# Patient Record
Sex: FEMALE | Race: WHITE | NOT HISPANIC OR LATINO | Employment: OTHER | URBAN - METROPOLITAN AREA
[De-identification: names, ages, dates, MRNs, and addresses within clinical notes are randomized per-mention and may not be internally consistent; named-entity substitution may affect disease eponyms.]

---

## 2019-03-07 ENCOUNTER — HOSPITAL ENCOUNTER (OUTPATIENT)
Dept: GENERAL RADIOLOGY | Facility: HOSPITAL | Age: 64
Discharge: HOME | End: 2019-03-07
Admitting: ORTHOPAEDIC SURGERY

## 2019-03-07 ENCOUNTER — APPOINTMENT (OUTPATIENT)
Dept: PREADMISSION TESTING | Facility: HOSPITAL | Age: 64
End: 2019-03-07

## 2019-03-07 ENCOUNTER — HOSPITAL ENCOUNTER (OUTPATIENT)
Dept: GENERAL RADIOLOGY | Facility: HOSPITAL | Age: 64
Discharge: HOME | End: 2019-03-07

## 2019-03-07 VITALS
TEMPERATURE: 98.5 F | RESPIRATION RATE: 16 BRPM | SYSTOLIC BLOOD PRESSURE: 124 MMHG | BODY MASS INDEX: 42.29 KG/M2 | OXYGEN SATURATION: 97 % | WEIGHT: 224 LBS | DIASTOLIC BLOOD PRESSURE: 77 MMHG | HEART RATE: 68 BPM | HEIGHT: 61 IN

## 2019-03-07 DIAGNOSIS — M17.9 OSTEOARTHRITIS OF KNEE, UNSPECIFIED LATERALITY, UNSPECIFIED OSTEOARTHRITIS TYPE: ICD-10-CM

## 2019-03-07 LAB
ALBUMIN SERPL-MCNC: 4.8 G/DL (ref 3.5–5.2)
ALBUMIN/GLOB SERPL: 1.5 G/DL
ALP SERPL-CCNC: 74 U/L (ref 39–117)
ALT SERPL W P-5'-P-CCNC: 26 U/L (ref 1–33)
ANION GAP SERPL CALCULATED.3IONS-SCNC: 13.8 MMOL/L
AST SERPL-CCNC: 20 U/L (ref 1–32)
BACTERIA UR QL AUTO: NORMAL /HPF
BILIRUB SERPL-MCNC: 0.5 MG/DL (ref 0.1–1.2)
BILIRUB UR QL STRIP: NEGATIVE
BUN BLD-MCNC: 28 MG/DL (ref 8–23)
BUN/CREAT SERPL: 32.9 (ref 7–25)
CALCIUM SPEC-SCNC: 10.4 MG/DL (ref 8.6–10.5)
CHLORIDE SERPL-SCNC: 101 MMOL/L (ref 98–107)
CLARITY UR: CLEAR
CO2 SERPL-SCNC: 26.2 MMOL/L (ref 22–29)
COLOR UR: YELLOW
CREAT BLD-MCNC: 0.85 MG/DL (ref 0.57–1)
DEPRECATED RDW RBC AUTO: 45 FL (ref 37–54)
ERYTHROCYTE [DISTWIDTH] IN BLOOD BY AUTOMATED COUNT: 13.6 % (ref 12.3–15.4)
GFR SERPL CREATININE-BSD FRML MDRD: 68 ML/MIN/1.73
GLOBULIN UR ELPH-MCNC: 3.3 GM/DL
GLUCOSE BLD-MCNC: 99 MG/DL (ref 65–99)
GLUCOSE UR STRIP-MCNC: NEGATIVE MG/DL
HCT VFR BLD AUTO: 42.8 % (ref 34–46.6)
HGB BLD-MCNC: 13.4 G/DL (ref 12–15.9)
HGB UR QL STRIP.AUTO: NEGATIVE
HYALINE CASTS UR QL AUTO: NORMAL /LPF
INR PPP: 1.09 (ref 0.9–1.1)
KETONES UR QL STRIP: NEGATIVE
LEUKOCYTE ESTERASE UR QL STRIP.AUTO: ABNORMAL
MCH RBC QN AUTO: 28 PG (ref 26.6–33)
MCHC RBC AUTO-ENTMCNC: 31.3 G/DL (ref 31.5–35.7)
MCV RBC AUTO: 89.5 FL (ref 79–97)
NITRITE UR QL STRIP: NEGATIVE
PH UR STRIP.AUTO: <=5 [PH] (ref 5–8)
PLATELET # BLD AUTO: 295 10*3/MM3 (ref 140–450)
PMV BLD AUTO: 10 FL (ref 6–12)
POTASSIUM BLD-SCNC: 3.7 MMOL/L (ref 3.5–5.2)
PROT SERPL-MCNC: 8.1 G/DL (ref 6–8.5)
PROT UR QL STRIP: NEGATIVE
PROTHROMBIN TIME: 13.9 SECONDS (ref 11.7–14.2)
RBC # BLD AUTO: 4.78 10*6/MM3 (ref 3.77–5.28)
RBC # UR: NORMAL /HPF
REF LAB TEST METHOD: NORMAL
SODIUM BLD-SCNC: 141 MMOL/L (ref 136–145)
SP GR UR STRIP: 1.03 (ref 1–1.03)
SQUAMOUS #/AREA URNS HPF: NORMAL /HPF
UROBILINOGEN UR QL STRIP: ABNORMAL
WBC NRBC COR # BLD: 7.29 10*3/MM3 (ref 3.4–10.8)
WBC UR QL AUTO: NORMAL /HPF

## 2019-03-07 PROCEDURE — 36415 COLL VENOUS BLD VENIPUNCTURE: CPT

## 2019-03-07 PROCEDURE — 85610 PROTHROMBIN TIME: CPT | Performed by: ORTHOPAEDIC SURGERY

## 2019-03-07 PROCEDURE — 93005 ELECTROCARDIOGRAM TRACING: CPT

## 2019-03-07 PROCEDURE — 85027 COMPLETE CBC AUTOMATED: CPT | Performed by: ORTHOPAEDIC SURGERY

## 2019-03-07 PROCEDURE — 93010 ELECTROCARDIOGRAM REPORT: CPT | Performed by: INTERNAL MEDICINE

## 2019-03-07 PROCEDURE — 71046 X-RAY EXAM CHEST 2 VIEWS: CPT

## 2019-03-07 PROCEDURE — 81001 URINALYSIS AUTO W/SCOPE: CPT | Performed by: ORTHOPAEDIC SURGERY

## 2019-03-07 PROCEDURE — 73560 X-RAY EXAM OF KNEE 1 OR 2: CPT

## 2019-03-07 PROCEDURE — 80053 COMPREHEN METABOLIC PANEL: CPT | Performed by: ORTHOPAEDIC SURGERY

## 2019-03-07 RX ORDER — HYDROCHLOROTHIAZIDE 25 MG/1
25 TABLET ORAL DAILY
COMMUNITY
End: 2019-10-04 | Stop reason: SDUPTHER

## 2019-03-07 RX ORDER — ATORVASTATIN CALCIUM 40 MG/1
40 TABLET, FILM COATED ORAL DAILY
COMMUNITY
Start: 2019-02-08 | End: 2022-08-05

## 2019-03-07 RX ORDER — ASPIRIN 81 MG/1
81 TABLET ORAL DAILY
COMMUNITY
End: 2019-03-15 | Stop reason: HOSPADM

## 2019-03-07 RX ORDER — OMEPRAZOLE 40 MG/1
40 CAPSULE, DELAYED RELEASE ORAL DAILY
COMMUNITY
End: 2020-03-18 | Stop reason: SDUPTHER

## 2019-03-07 RX ORDER — CHLORAL HYDRATE 500 MG
2 CAPSULE ORAL DAILY
COMMUNITY
End: 2019-09-10

## 2019-03-07 RX ORDER — LOSARTAN POTASSIUM 100 MG/1
100 TABLET ORAL DAILY
COMMUNITY
End: 2019-05-07 | Stop reason: SDUPTHER

## 2019-03-07 RX ORDER — PAROXETINE HYDROCHLORIDE 20 MG/1
20 TABLET, FILM COATED ORAL DAILY
COMMUNITY
End: 2019-05-14 | Stop reason: SDUPTHER

## 2019-03-07 RX ORDER — AMLODIPINE BESYLATE 5 MG/1
5 TABLET ORAL DAILY
COMMUNITY
End: 2019-04-15 | Stop reason: SDUPTHER

## 2019-03-07 ASSESSMENT — KOOS JR
KOOS JR SCORE: 36.931
KOOS JR SCORE: 20

## 2019-03-07 NOTE — DISCHARGE INSTRUCTIONS
Take the following medications the morning of surgery with a small sip of water:  AMLODIPINE,LOSARTAN AND OMEPRAZOLE    General Instructions:  • Do not eat solid food after midnight the night before surgery.  • You may drink clear liquids day of surgery but must stop at least one hour before your hospital arrival time.  • It is beneficial for you to have a clear drink that contains carbohydrates the day of surgery.  We suggest a 12 to 20 ounce bottle of Gatorade or Powerade for non-diabetic patients or a 12 to 20 ounce bottle of G2 or Powerade Zero for diabetic patients. (Pediatric patients, are not advised to drink a 12 to 20 ounce carbohydrate drink)    Clear liquids are liquids you can see through.  Nothing red in color.     Plain water                               Sports drinks  Sodas                                   Gelatin (Jell-O)  Fruit juices without pulp such as white grape juice and apple juice  Popsicles that contain no fruit or yogurt  Tea or coffee (no cream or milk added)  Gatorade / Powerade  G2 / Powerade Zero    • Infants may have breast milk up to four hours before surgery.  • Infants drinking formula may drink formula up to six hours before surgery.   • Patients who avoid smoking, chewing tobacco and alcohol for 4 weeks prior to surgery have a reduced risk of post-operative complications.  Quit smoking as many days before surgery as you can.  • Do not smoke, use chewing tobacco or drink alcohol the day of surgery.   • If applicable bring your C-PAP/ BI-PAP machine.  • Bring any papers given to you in the doctor’s office.  • Wear clean comfortable clothes and socks.  • Do not wear contact lenses or make-up.  Bring a case for your glasses.   • Bring crutches or walker if applicable.  • Remove all piercings.  Leave jewelry and any other valuables at home.  • Hair extensions with metal clips must be removed prior to surgery.  • The Pre-Admission Testing nurse will instruct you to bring medications  if unable to obtain an accurate list in Pre-Admission Testing.        If you were given a blood bank ID arm band remember to bring it with you the day of surgery.    Preventing a Surgical Site Infection:  • For 2 to 3 days before surgery, avoid shaving with a razor because the razor can irritate skin and make it easier to develop an infection.    • Any areas of open skin can increase the risk of a post-operative wound infection by allowing bacteria to enter and travel throughout the body.  Notify your surgeon if you have any skin wounds / rashes even if it is not near the expected surgical site.  The area will need assessed to determine if surgery should be delayed until it is healed.  • The night prior to surgery sleep in a clean bed with clean clothing.  Do not allow pets to sleep with you.  • Shower on the morning of surgery using a fresh bar of anti-bacterial soap (such as Dial) and clean washcloth.  Dry with a clean towel and dress in clean clothing.  • Ask your surgeon if you will be receiving antibiotics prior to surgery.  • Make sure you, your family, and all healthcare providers clean their hands with soap and water or an alcohol based hand  before caring for you or your wound.    Day of surgery: 3/14/2019 ARRIVAL TIME 10:30 AM  Upon arrival, a Pre-op nurse and Anesthesiologist will review your health history, obtain vital signs, and answer questions you may have.  The only belongings needed at this time will be your home medications and if applicable your C-PAP/BI-PAP machine.  If you are staying overnight your family can leave the rest of your belongings in the car and bring them to your room later.  A Pre-op nurse will start an IV and you may receive medication in preparation for surgery, including something to help you relax.  Your family will be able to see you in the Pre-op area.  While you are in surgery your family should notify the waiting room  if they leave the waiting room  area and provide a contact phone number.    Please be aware that surgery does come with discomfort.  We want to make every effort to control your discomfort so please discuss any uncontrolled symptoms with your nurse.   Your doctor will most likely have prescribed pain medications.      If you are going home after surgery you will receive individualized written care instructions before being discharged.  A responsible adult must drive you to and from the hospital on the day of your surgery and stay with you for 24 hours.    If you are staying overnight following surgery, you will be transported to your hospital room following the recovery period.  [facility] has all private rooms.    You have received a list of surgical assistants for your reference.  If you have any questions please call Pre-Admission Testing at [phone].  Deductibles and co-payments are collected on the day of service. Please be prepared to pay the required co-pay, deductible or deposit on the day of service as defined by your plan.    2% CHLORAHEXIDINE GLUCONATE* CLOTH  Preparing or “prepping” skin before surgery can reduce the risk of infection at the surgical site. To make the process easier, [facility] has chosen disposable cloths moistened with a rinse-free, 2% Chlorhexidine Gluconate (CHG) antiseptic solution. The steps below outline the prepping process and should be carefully followed.        Use the prep cloth on the area that is circled in the diagram             Directions Night before Surgery  1) Shower using a fresh bar of anti-bacterial soap (such as Dial) and clean washcloth.  Use a clean towel to completely dry your skin.  2) Do not use any lotions, oils or creams on your skin.  3) Open the package and remove 1 cloth, wipe your skin for 30 seconds in a circular motion.  Allow to dry for 3 minutes.  4) Repeat #3 with second cloth.  5) Do not touch your eyes, ears, or mouth with the prep cloth.  6) Allow  the wet prep solution to air dry.  7) Discard the prep cloth and wash your hands with soap and water.   8) Dress in clean bed clothes and sleep on fresh clean bed sheets.   9) You may experience some temporary itching after the prep.    Directions Day of Surgery  1) Repeat steps 1,2,3,4,5,6,7, and 9.   2) Dress in clean clothes before coming to the hospital.    BACTROBAN NASAL OINTMENT  There are many germs normally in your nose. Bactroban is an ointment that will help reduce these germs. Please follow these instructions for Bactroban use:      ____The day before surgery in the morning  Date________    ____The day before surgery in the evening              Date________    ____The day of surgery in the morning    Date________    **Squirt ½ package of Bactroban Ointment onto a cotton applicator and apply to inside of 1st nostril.  Squirt the remaining Bactroban and apply to the inside of the other nostril

## 2019-03-11 NOTE — PROGRESS NOTES
Pre-Operative Orthopedic Assessment      Patient: Ashley Romero    YOB: 1955      Age/Gender: 63 y.o. female  Medical Record Number: [id number]  Surgical Procedure Planned: RIGHT TOTAL KNEE ARTHROPLASTY     Surgeon: Estevan Esquivel MD    Date of Surgery Planned: 3/14/19    Question Value Score    Patient Score   1. What is your age group? 50-65 years  66-75 years  >75 years =2  =1  =0 2   2. Gender? Male  Female =2  =1 1   3. How far on average can you walk? (a block is 200 meters) Two blocks or more (+\- rest)  1-2 blocks (+\- rest)  Housebound (most of time) =2  =1  =0 1   4. Which gait aid to you use? (more often than not) None  Single-Point Stick  Crutches/Frame =2  =1  =0 2   5. Do you use community  supports? (home help, meals on wheels, district nursing) None or one per week  Two or more per week =1  =0 1   6. Will you live with someone who can care for you after your operation? Yes  No =3  =0 3      Your Score (out of 12)  10     Key Destination at Discharge from acute care predicted by score   Scores < 6  -- extended inpatient rehabilitation   Scores 6-9 -- additional intervention to discharge directly home (Rehabilitation in home)   Scores > 9 -- directly home         Discharge Disposition/Planning:     Patient Response   Discussed the Predicted discharge disposition needed based on RAPT Assessment with the patient.    Y   Patient selected discharge disposition:   HOME   Out Patient Rehabilitation Facility of Choice:    [facility] Preferred:   [facility] [place]   [facility]   [facility]# [phone]     Subacute Inpatient Rehabilitation:  Complete this section only if planning inpatient services at a Subacute Facility     Patient Response   Subacute Facility Preferred (Please list 2 facilities:      Requires pre-certification for inpatient rehabilitation services?         Planned source of transportation to inpatient  rehabilitation facility?       If choosing inpatient services at an Acute or Subacute Facility please list a subsequent back-up plan (in case patient fails to qualify for inpatient rehabilitation). Back-up plans should include caregiver (family member or friend) for first 24-48 post- -operatively.       Home Equipment Patient Response   Does patient have a walker for home use?    N   Does patient have a 3 in 1 commode for home use?    N   Does patient have a shower chair for home use?    N   Does patient have an elevated commode seat for home use? N   Does patient have a cane for home use?    N   Is there any other medical equipment in the home? If so,  List in comment section below N   Pre-Operative Class Attendance Patient Response   Attended or scheduled to attend the pre-operative class within 1 year of total joint replacement? Y   Patient Education  Completed   Expected time of discharge discussed Y   Encouraged to attend Pre-Operative Class    Y   Education re: Predicted Discharge Disposition based on RAPT score    Y   Patient receptive and voiced understanding of information given    Y                                                                                                            Comments:    FACE SHEET CORRECT.     2 STEPS INTO HOUSE W/RAILAND NONE INSIDE.     NO EQUIPMENT.     PLANS TO USE [facility] IN [place] FOR HOME PT AND [facility] IN [facility] FOR OUTPT REHAB .                                       Signature: Ga Best LPN    Date:  3/11/2019

## 2019-03-14 ENCOUNTER — HOSPITAL ENCOUNTER (OUTPATIENT)
Facility: HOSPITAL | Age: 64
Setting detail: OBSERVATION
Discharge: HOME | End: 2019-03-15
Attending: ORTHOPAEDIC SURGERY | Admitting: ORTHOPAEDIC SURGERY

## 2019-03-14 ENCOUNTER — ANESTHESIA EVENT (OUTPATIENT)
Dept: PERIOP | Facility: HOSPITAL | Age: 64
End: 2019-03-14

## 2019-03-14 ENCOUNTER — ANESTHESIA (OUTPATIENT)
Dept: PERIOP | Facility: HOSPITAL | Age: 64
End: 2019-03-14

## 2019-03-14 ENCOUNTER — APPOINTMENT (OUTPATIENT)
Dept: GENERAL RADIOLOGY | Facility: HOSPITAL | Age: 64
End: 2019-03-14

## 2019-03-14 DIAGNOSIS — R26.2 DIFFICULTY WALKING: Primary | ICD-10-CM

## 2019-03-14 PROBLEM — M17.9 OA (OSTEOARTHRITIS) OF KNEE: Status: ACTIVE | Noted: 2019-03-14

## 2019-03-14 LAB
ALT SERPL W P-5'-P-CCNC: 27 U/L (ref 1–33)
HBV SURFACE AB SER RIA-ACNC: NORMAL
HBV SURFACE AG SERPL QL IA: NORMAL
HCV AB SER DONR QL: NORMAL
HIV1+2 AB SER QL: NORMAL

## 2019-03-14 PROCEDURE — 25010000002 ONDANSETRON PER 1 MG: Performed by: NURSE ANESTHETIST, CERTIFIED REGISTERED

## 2019-03-14 PROCEDURE — 25010000002 FENTANYL CITRATE (PF) 100 MCG/2ML SOLUTION: Performed by: ANESTHESIOLOGY

## 2019-03-14 PROCEDURE — G0378 HOSPITAL OBSERVATION PER HR: HCPCS

## 2019-03-14 PROCEDURE — 25010000002 FENTANYL CITRATE (PF) 100 MCG/2ML SOLUTION: Performed by: NURSE ANESTHETIST, CERTIFIED REGISTERED

## 2019-03-14 PROCEDURE — 25010000002 DEXAMETHASONE PER 1 MG: Performed by: NURSE ANESTHETIST, CERTIFIED REGISTERED

## 2019-03-14 PROCEDURE — 86706 HEP B SURFACE ANTIBODY: CPT | Performed by: ORTHOPAEDIC SURGERY

## 2019-03-14 PROCEDURE — 25010000002 HYDROMORPHONE PER 4 MG: Performed by: NURSE ANESTHETIST, CERTIFIED REGISTERED

## 2019-03-14 PROCEDURE — 73560 X-RAY EXAM OF KNEE 1 OR 2: CPT

## 2019-03-14 PROCEDURE — 84460 ALANINE AMINO (ALT) (SGPT): CPT | Performed by: ORTHOPAEDIC SURGERY

## 2019-03-14 PROCEDURE — 25010000002 PROPOFOL 10 MG/ML EMULSION: Performed by: NURSE ANESTHETIST, CERTIFIED REGISTERED

## 2019-03-14 PROCEDURE — 25010000002 ROPIVACAINE PER 1 MG: Performed by: ANESTHESIOLOGY

## 2019-03-14 PROCEDURE — 25010000002 ROPIVACAINE PER 1 MG: Performed by: ORTHOPAEDIC SURGERY

## 2019-03-14 PROCEDURE — 25010000002 MIDAZOLAM PER 1 MG: Performed by: ANESTHESIOLOGY

## 2019-03-14 PROCEDURE — 25010000002 KETOROLAC TROMETHAMINE PER 15 MG: Performed by: ORTHOPAEDIC SURGERY

## 2019-03-14 PROCEDURE — 25010000003 CEFAZOLIN IN DEXTROSE 2-4 GM/100ML-% SOLUTION: Performed by: ORTHOPAEDIC SURGERY

## 2019-03-14 PROCEDURE — C1713 ANCHOR/SCREW BN/BN,TIS/BN: HCPCS | Performed by: ORTHOPAEDIC SURGERY

## 2019-03-14 PROCEDURE — 97110 THERAPEUTIC EXERCISES: CPT

## 2019-03-14 PROCEDURE — G0432 EIA HIV-1/HIV-2 SCREEN: HCPCS | Performed by: ORTHOPAEDIC SURGERY

## 2019-03-14 PROCEDURE — 86803 HEPATITIS C AB TEST: CPT | Performed by: ORTHOPAEDIC SURGERY

## 2019-03-14 PROCEDURE — 97161 PT EVAL LOW COMPLEX 20 MIN: CPT

## 2019-03-14 PROCEDURE — C1776 JOINT DEVICE (IMPLANTABLE): HCPCS | Performed by: ORTHOPAEDIC SURGERY

## 2019-03-14 PROCEDURE — 87340 HEPATITIS B SURFACE AG IA: CPT | Performed by: ORTHOPAEDIC SURGERY

## 2019-03-14 DEVICE — CMT BONE PALACOS R HI/VISC 1X40: Type: IMPLANTABLE DEVICE | Site: KNEE | Status: FUNCTIONAL

## 2019-03-14 DEVICE — CAP TOTL KN CMT PREMIUM: Type: IMPLANTABLE DEVICE | Site: KNEE | Status: FUNCTIONAL

## 2019-03-14 DEVICE — COMP FEM/KN VANGUARD INTLK CR 62.5MM NS RT: Type: IMPLANTABLE DEVICE | Site: KNEE | Status: FUNCTIONAL

## 2019-03-14 DEVICE — TRY TIB INTERLK PRI 67MM: Type: IMPLANTABLE DEVICE | Site: KNEE | Status: FUNCTIONAL

## 2019-03-14 DEVICE — STEM TIB PRI FINN 46X40MM: Type: IMPLANTABLE DEVICE | Site: KNEE | Status: FUNCTIONAL

## 2019-03-14 DEVICE — IMPLANTABLE DEVICE: Type: IMPLANTABLE DEVICE | Site: KNEE | Status: FUNCTIONAL

## 2019-03-14 DEVICE — PAT 3PEG THN 34X7.8 34MM: Type: IMPLANTABLE DEVICE | Site: KNEE | Status: FUNCTIONAL

## 2019-03-14 RX ORDER — PANTOPRAZOLE SODIUM 40 MG/1
40 TABLET, DELAYED RELEASE ORAL EVERY MORNING
Status: DISCONTINUED | OUTPATIENT
Start: 2019-03-14 | End: 2019-03-15 | Stop reason: HOSPADM

## 2019-03-14 RX ORDER — FLUMAZENIL 0.1 MG/ML
0.2 INJECTION INTRAVENOUS AS NEEDED
Status: DISCONTINUED | OUTPATIENT
Start: 2019-03-14 | End: 2019-03-14 | Stop reason: HOSPADM

## 2019-03-14 RX ORDER — KETOROLAC TROMETHAMINE 30 MG/ML
15 INJECTION, SOLUTION INTRAMUSCULAR; INTRAVENOUS EVERY 8 HOURS PRN
Status: DISCONTINUED | OUTPATIENT
Start: 2019-03-14 | End: 2019-03-15 | Stop reason: HOSPADM

## 2019-03-14 RX ORDER — MIDAZOLAM HYDROCHLORIDE 1 MG/ML
2 INJECTION INTRAMUSCULAR; INTRAVENOUS
Status: DISCONTINUED | OUTPATIENT
Start: 2019-03-14 | End: 2019-03-14 | Stop reason: HOSPADM

## 2019-03-14 RX ORDER — PAROXETINE HYDROCHLORIDE 20 MG/1
20 TABLET, FILM COATED ORAL DAILY
Status: DISCONTINUED | OUTPATIENT
Start: 2019-03-14 | End: 2019-03-15 | Stop reason: HOSPADM

## 2019-03-14 RX ORDER — MIDAZOLAM HYDROCHLORIDE 1 MG/ML
1 INJECTION INTRAMUSCULAR; INTRAVENOUS
Status: DISCONTINUED | OUTPATIENT
Start: 2019-03-14 | End: 2019-03-14 | Stop reason: HOSPADM

## 2019-03-14 RX ORDER — BISACODYL 10 MG
10 SUPPOSITORY, RECTAL RECTAL DAILY PRN
Status: DISCONTINUED | OUTPATIENT
Start: 2019-03-14 | End: 2019-03-15 | Stop reason: HOSPADM

## 2019-03-14 RX ORDER — ATORVASTATIN CALCIUM 20 MG/1
40 TABLET, FILM COATED ORAL DAILY
Status: DISCONTINUED | OUTPATIENT
Start: 2019-03-14 | End: 2019-03-15 | Stop reason: HOSPADM

## 2019-03-14 RX ORDER — SODIUM CHLORIDE, SODIUM LACTATE, POTASSIUM CHLORIDE, CALCIUM CHLORIDE 600; 310; 30; 20 MG/100ML; MG/100ML; MG/100ML; MG/100ML
9 INJECTION, SOLUTION INTRAVENOUS CONTINUOUS
Status: DISCONTINUED | OUTPATIENT
Start: 2019-03-14 | End: 2019-03-15 | Stop reason: HOSPADM

## 2019-03-14 RX ORDER — DIPHENHYDRAMINE HCL 25 MG
25 CAPSULE ORAL
Status: DISCONTINUED | OUTPATIENT
Start: 2019-03-14 | End: 2019-03-14 | Stop reason: HOSPADM

## 2019-03-14 RX ORDER — HYDROCODONE BITARTRATE AND ACETAMINOPHEN 7.5; 325 MG/1; MG/1
1 TABLET ORAL ONCE AS NEEDED
Status: DISCONTINUED | OUTPATIENT
Start: 2019-03-14 | End: 2019-03-14 | Stop reason: HOSPADM

## 2019-03-14 RX ORDER — AMLODIPINE BESYLATE 5 MG/1
5 TABLET ORAL DAILY
Status: DISCONTINUED | OUTPATIENT
Start: 2019-03-15 | End: 2019-03-15 | Stop reason: HOSPADM

## 2019-03-14 RX ORDER — HYDROCHLOROTHIAZIDE 25 MG/1
25 TABLET ORAL DAILY
Status: DISCONTINUED | OUTPATIENT
Start: 2019-03-14 | End: 2019-03-15 | Stop reason: HOSPADM

## 2019-03-14 RX ORDER — TRANEXAMIC ACID 100 MG/ML
INJECTION, SOLUTION INTRAVENOUS AS NEEDED
Status: DISCONTINUED | OUTPATIENT
Start: 2019-03-14 | End: 2019-03-14 | Stop reason: SURG

## 2019-03-14 RX ORDER — ONDANSETRON 2 MG/ML
4 INJECTION INTRAMUSCULAR; INTRAVENOUS ONCE AS NEEDED
Status: DISCONTINUED | OUTPATIENT
Start: 2019-03-14 | End: 2019-03-14 | Stop reason: HOSPADM

## 2019-03-14 RX ORDER — DEXAMETHASONE SODIUM PHOSPHATE 10 MG/ML
INJECTION INTRAMUSCULAR; INTRAVENOUS AS NEEDED
Status: DISCONTINUED | OUTPATIENT
Start: 2019-03-14 | End: 2019-03-14 | Stop reason: SURG

## 2019-03-14 RX ORDER — ROPIVACAINE HYDROCHLORIDE 5 MG/ML
INJECTION, SOLUTION EPIDURAL; INFILTRATION; PERINEURAL
Status: COMPLETED | OUTPATIENT
Start: 2019-03-14 | End: 2019-03-14

## 2019-03-14 RX ORDER — ASPIRIN 325 MG
325 TABLET, DELAYED RELEASE (ENTERIC COATED) ORAL 2 TIMES DAILY WITH MEALS
Status: DISCONTINUED | OUTPATIENT
Start: 2019-03-14 | End: 2019-03-15 | Stop reason: HOSPADM

## 2019-03-14 RX ORDER — MEPERIDINE HYDROCHLORIDE 25 MG/ML
12.5 INJECTION INTRAMUSCULAR; INTRAVENOUS; SUBCUTANEOUS
Status: DISCONTINUED | OUTPATIENT
Start: 2019-03-14 | End: 2019-03-14 | Stop reason: HOSPADM

## 2019-03-14 RX ORDER — EPHEDRINE SULFATE 50 MG/ML
5 INJECTION, SOLUTION INTRAVENOUS ONCE AS NEEDED
Status: DISCONTINUED | OUTPATIENT
Start: 2019-03-14 | End: 2019-03-14 | Stop reason: HOSPADM

## 2019-03-14 RX ORDER — FAMOTIDINE 10 MG/ML
20 INJECTION, SOLUTION INTRAVENOUS ONCE
Status: COMPLETED | OUTPATIENT
Start: 2019-03-14 | End: 2019-03-14

## 2019-03-14 RX ORDER — ACETAMINOPHEN 325 MG/1
325 TABLET ORAL EVERY 4 HOURS PRN
Status: DISCONTINUED | OUTPATIENT
Start: 2019-03-14 | End: 2019-03-15 | Stop reason: HOSPADM

## 2019-03-14 RX ORDER — DOCUSATE SODIUM 100 MG/1
100 CAPSULE, LIQUID FILLED ORAL 2 TIMES DAILY PRN
Status: DISCONTINUED | OUTPATIENT
Start: 2019-03-14 | End: 2019-03-15 | Stop reason: HOSPADM

## 2019-03-14 RX ORDER — ACETAMINOPHEN 500 MG
1000 TABLET ORAL EVERY 6 HOURS PRN
COMMUNITY

## 2019-03-14 RX ORDER — CHOLECALCIFEROL (VITAMIN D3) 125 MCG
5 CAPSULE ORAL NIGHTLY PRN
Status: DISCONTINUED | OUTPATIENT
Start: 2019-03-14 | End: 2019-03-15 | Stop reason: HOSPADM

## 2019-03-14 RX ORDER — PROMETHAZINE HYDROCHLORIDE 25 MG/ML
12.5 INJECTION, SOLUTION INTRAMUSCULAR; INTRAVENOUS ONCE AS NEEDED
Status: DISCONTINUED | OUTPATIENT
Start: 2019-03-14 | End: 2019-03-14 | Stop reason: HOSPADM

## 2019-03-14 RX ORDER — PROMETHAZINE HYDROCHLORIDE 25 MG/1
25 TABLET ORAL ONCE AS NEEDED
Status: DISCONTINUED | OUTPATIENT
Start: 2019-03-14 | End: 2019-03-14 | Stop reason: HOSPADM

## 2019-03-14 RX ORDER — CEFAZOLIN SODIUM 2 G/100ML
2 INJECTION, SOLUTION INTRAVENOUS ONCE
Status: COMPLETED | OUTPATIENT
Start: 2019-03-14 | End: 2019-03-14

## 2019-03-14 RX ORDER — NALOXONE HCL 0.4 MG/ML
0.2 VIAL (ML) INJECTION AS NEEDED
Status: DISCONTINUED | OUTPATIENT
Start: 2019-03-14 | End: 2019-03-14 | Stop reason: HOSPADM

## 2019-03-14 RX ORDER — PROMETHAZINE HYDROCHLORIDE 25 MG/1
25 SUPPOSITORY RECTAL ONCE AS NEEDED
Status: DISCONTINUED | OUTPATIENT
Start: 2019-03-14 | End: 2019-03-14 | Stop reason: HOSPADM

## 2019-03-14 RX ORDER — ONDANSETRON 4 MG/1
4 TABLET, ORALLY DISINTEGRATING ORAL EVERY 6 HOURS PRN
Status: DISCONTINUED | OUTPATIENT
Start: 2019-03-14 | End: 2019-03-15 | Stop reason: HOSPADM

## 2019-03-14 RX ORDER — CELECOXIB 200 MG/1
200 CAPSULE ORAL ONCE
Status: COMPLETED | OUTPATIENT
Start: 2019-03-14 | End: 2019-03-14

## 2019-03-14 RX ORDER — OXYCODONE HYDROCHLORIDE AND ACETAMINOPHEN 5; 325 MG/1; MG/1
2 TABLET ORAL EVERY 4 HOURS PRN
Status: DISCONTINUED | OUTPATIENT
Start: 2019-03-14 | End: 2019-03-15 | Stop reason: HOSPADM

## 2019-03-14 RX ORDER — FENTANYL CITRATE 50 UG/ML
50 INJECTION, SOLUTION INTRAMUSCULAR; INTRAVENOUS
Status: DISCONTINUED | OUTPATIENT
Start: 2019-03-14 | End: 2019-03-14 | Stop reason: HOSPADM

## 2019-03-14 RX ORDER — NALOXONE HCL 0.4 MG/ML
0.4 VIAL (ML) INJECTION
Status: DISCONTINUED | OUTPATIENT
Start: 2019-03-14 | End: 2019-03-15 | Stop reason: HOSPADM

## 2019-03-14 RX ORDER — FENTANYL CITRATE 50 UG/ML
INJECTION, SOLUTION INTRAMUSCULAR; INTRAVENOUS AS NEEDED
Status: DISCONTINUED | OUTPATIENT
Start: 2019-03-14 | End: 2019-03-14 | Stop reason: SURG

## 2019-03-14 RX ORDER — ALBUTEROL SULFATE 2.5 MG/3ML
2.5 SOLUTION RESPIRATORY (INHALATION) ONCE AS NEEDED
Status: DISCONTINUED | OUTPATIENT
Start: 2019-03-14 | End: 2019-03-14 | Stop reason: HOSPADM

## 2019-03-14 RX ORDER — MAGNESIUM HYDROXIDE 1200 MG/15ML
LIQUID ORAL AS NEEDED
Status: DISCONTINUED | OUTPATIENT
Start: 2019-03-14 | End: 2019-03-14 | Stop reason: HOSPADM

## 2019-03-14 RX ORDER — ACETAMINOPHEN 325 MG/1
650 TABLET ORAL ONCE AS NEEDED
Status: DISCONTINUED | OUTPATIENT
Start: 2019-03-14 | End: 2019-03-14 | Stop reason: HOSPADM

## 2019-03-14 RX ORDER — SODIUM CHLORIDE 0.9 % (FLUSH) 0.9 %
3 SYRINGE (ML) INJECTION EVERY 12 HOURS SCHEDULED
Status: DISCONTINUED | OUTPATIENT
Start: 2019-03-14 | End: 2019-03-15 | Stop reason: HOSPADM

## 2019-03-14 RX ORDER — ONDANSETRON 4 MG/1
4 TABLET, FILM COATED ORAL EVERY 6 HOURS PRN
Status: DISCONTINUED | OUTPATIENT
Start: 2019-03-14 | End: 2019-03-15 | Stop reason: HOSPADM

## 2019-03-14 RX ORDER — LOSARTAN POTASSIUM 100 MG/1
100 TABLET ORAL DAILY
Status: DISCONTINUED | OUTPATIENT
Start: 2019-03-14 | End: 2019-03-15 | Stop reason: HOSPADM

## 2019-03-14 RX ORDER — LIDOCAINE HYDROCHLORIDE 20 MG/ML
INJECTION, SOLUTION INFILTRATION; PERINEURAL AS NEEDED
Status: DISCONTINUED | OUTPATIENT
Start: 2019-03-14 | End: 2019-03-14 | Stop reason: SURG

## 2019-03-14 RX ORDER — OXYCODONE AND ACETAMINOPHEN 7.5; 325 MG/1; MG/1
1 TABLET ORAL ONCE AS NEEDED
Status: DISCONTINUED | OUTPATIENT
Start: 2019-03-14 | End: 2019-03-14 | Stop reason: HOSPADM

## 2019-03-14 RX ORDER — ACETAMINOPHEN 500 MG
1000 TABLET ORAL ONCE
Status: COMPLETED | OUTPATIENT
Start: 2019-03-14 | End: 2019-03-14

## 2019-03-14 RX ORDER — FERROUS SULFATE 325(65) MG
325 TABLET ORAL
Status: DISCONTINUED | OUTPATIENT
Start: 2019-03-14 | End: 2019-03-15 | Stop reason: HOSPADM

## 2019-03-14 RX ORDER — SODIUM CHLORIDE 0.9 % (FLUSH) 0.9 %
1-10 SYRINGE (ML) INJECTION AS NEEDED
Status: DISCONTINUED | OUTPATIENT
Start: 2019-03-14 | End: 2019-03-14 | Stop reason: HOSPADM

## 2019-03-14 RX ORDER — HYDROMORPHONE HYDROCHLORIDE 1 MG/ML
0.5 INJECTION, SOLUTION INTRAMUSCULAR; INTRAVENOUS; SUBCUTANEOUS
Status: DISCONTINUED | OUTPATIENT
Start: 2019-03-14 | End: 2019-03-14 | Stop reason: HOSPADM

## 2019-03-14 RX ORDER — ONDANSETRON 2 MG/ML
INJECTION INTRAMUSCULAR; INTRAVENOUS AS NEEDED
Status: DISCONTINUED | OUTPATIENT
Start: 2019-03-14 | End: 2019-03-14 | Stop reason: SURG

## 2019-03-14 RX ORDER — LIDOCAINE HYDROCHLORIDE 10 MG/ML
0.5 INJECTION, SOLUTION EPIDURAL; INFILTRATION; INTRACAUDAL; PERINEURAL ONCE AS NEEDED
Status: DISCONTINUED | OUTPATIENT
Start: 2019-03-14 | End: 2019-03-14 | Stop reason: HOSPADM

## 2019-03-14 RX ORDER — DIAZEPAM 5 MG/1
5 TABLET ORAL EVERY 6 HOURS PRN
Status: DISCONTINUED | OUTPATIENT
Start: 2019-03-14 | End: 2019-03-15 | Stop reason: HOSPADM

## 2019-03-14 RX ORDER — CLINDAMYCIN PHOSPHATE 900 MG/50ML
900 INJECTION INTRAVENOUS EVERY 8 HOURS
Status: COMPLETED | OUTPATIENT
Start: 2019-03-14 | End: 2019-03-14

## 2019-03-14 RX ORDER — OXYCODONE HYDROCHLORIDE AND ACETAMINOPHEN 5; 325 MG/1; MG/1
1 TABLET ORAL EVERY 4 HOURS PRN
Status: DISCONTINUED | OUTPATIENT
Start: 2019-03-14 | End: 2019-03-15 | Stop reason: HOSPADM

## 2019-03-14 RX ORDER — SENNA AND DOCUSATE SODIUM 50; 8.6 MG/1; MG/1
2 TABLET, FILM COATED ORAL 2 TIMES DAILY PRN
Status: DISCONTINUED | OUTPATIENT
Start: 2019-03-14 | End: 2019-03-15 | Stop reason: HOSPADM

## 2019-03-14 RX ORDER — ONDANSETRON 2 MG/ML
4 INJECTION INTRAMUSCULAR; INTRAVENOUS EVERY 6 HOURS PRN
Status: DISCONTINUED | OUTPATIENT
Start: 2019-03-14 | End: 2019-03-15 | Stop reason: HOSPADM

## 2019-03-14 RX ORDER — SODIUM CHLORIDE 0.9 % (FLUSH) 0.9 %
1-10 SYRINGE (ML) INJECTION AS NEEDED
Status: DISCONTINUED | OUTPATIENT
Start: 2019-03-14 | End: 2019-03-15 | Stop reason: HOSPADM

## 2019-03-14 RX ORDER — SODIUM CHLORIDE, SODIUM LACTATE, POTASSIUM CHLORIDE, CALCIUM CHLORIDE 600; 310; 30; 20 MG/100ML; MG/100ML; MG/100ML; MG/100ML
INJECTION, SOLUTION INTRAVENOUS CONTINUOUS PRN
Status: DISCONTINUED | OUTPATIENT
Start: 2019-03-14 | End: 2019-03-14 | Stop reason: SURG

## 2019-03-14 RX ORDER — SODIUM CHLORIDE 450 MG/100ML
100 INJECTION, SOLUTION INTRAVENOUS CONTINUOUS
Status: DISCONTINUED | OUTPATIENT
Start: 2019-03-14 | End: 2019-03-15 | Stop reason: HOSPADM

## 2019-03-14 RX ORDER — ACETAMINOPHEN 500 MG
1000 TABLET ORAL EVERY 6 HOURS PRN
Status: DISCONTINUED | OUTPATIENT
Start: 2019-03-14 | End: 2019-03-15 | Stop reason: HOSPADM

## 2019-03-14 RX ORDER — MORPHINE SULFATE 2 MG/ML
6 INJECTION, SOLUTION INTRAMUSCULAR; INTRAVENOUS
Status: DISCONTINUED | OUTPATIENT
Start: 2019-03-14 | End: 2019-03-15 | Stop reason: HOSPADM

## 2019-03-14 RX ORDER — PROPOFOL 10 MG/ML
VIAL (ML) INTRAVENOUS AS NEEDED
Status: DISCONTINUED | OUTPATIENT
Start: 2019-03-14 | End: 2019-03-14 | Stop reason: SURG

## 2019-03-14 RX ADMIN — DEXAMETHASONE SODIUM PHOSPHATE 8 MG: 10 INJECTION INTRAMUSCULAR; INTRAVENOUS at 09:36

## 2019-03-14 RX ADMIN — CLINDAMYCIN PHOSPHATE 900 MG: 900 INJECTION, SOLUTION INTRAVENOUS at 21:47

## 2019-03-14 RX ADMIN — FENTANYL CITRATE 50 MCG: 50 INJECTION, SOLUTION INTRAMUSCULAR; INTRAVENOUS at 11:03

## 2019-03-14 RX ADMIN — OXYCODONE AND ACETAMINOPHEN 2 TABLET: 5; 325 TABLET ORAL at 21:47

## 2019-03-14 RX ADMIN — FENTANYL CITRATE 50 MCG: 50 INJECTION, SOLUTION INTRAMUSCULAR; INTRAVENOUS at 07:58

## 2019-03-14 RX ADMIN — FENTANYL CITRATE 50 MCG: 50 INJECTION, SOLUTION INTRAMUSCULAR; INTRAVENOUS at 11:32

## 2019-03-14 RX ADMIN — OXYCODONE AND ACETAMINOPHEN 1 TABLET: 5; 325 TABLET ORAL at 17:41

## 2019-03-14 RX ADMIN — HYDROMORPHONE HYDROCHLORIDE 0.5 MG: 1 INJECTION, SOLUTION INTRAMUSCULAR; INTRAVENOUS; SUBCUTANEOUS at 11:03

## 2019-03-14 RX ADMIN — SODIUM CHLORIDE, PRESERVATIVE FREE 10 ML: 5 INJECTION INTRAVENOUS at 19:55

## 2019-03-14 RX ADMIN — SODIUM CHLORIDE 100 ML/HR: 4.5 INJECTION, SOLUTION INTRAVENOUS at 13:03

## 2019-03-14 RX ADMIN — KETOROLAC TROMETHAMINE 15 MG: 30 INJECTION, SOLUTION INTRAMUSCULAR; INTRAVENOUS at 11:04

## 2019-03-14 RX ADMIN — PROPOFOL 150 MG: 10 INJECTION, EMULSION INTRAVENOUS at 09:02

## 2019-03-14 RX ADMIN — CEFAZOLIN SODIUM 2 G: 2 INJECTION, SOLUTION INTRAVENOUS at 08:59

## 2019-03-14 RX ADMIN — HYDROMORPHONE HYDROCHLORIDE 0.5 MG: 1 INJECTION, SOLUTION INTRAMUSCULAR; INTRAVENOUS; SUBCUTANEOUS at 11:47

## 2019-03-14 RX ADMIN — KETOROLAC TROMETHAMINE 15 MG: 30 INJECTION, SOLUTION INTRAMUSCULAR; INTRAVENOUS at 19:55

## 2019-03-14 RX ADMIN — CELECOXIB 200 MG: 200 CAPSULE ORAL at 06:48

## 2019-03-14 RX ADMIN — ASPIRIN 325 MG: 325 TABLET, DELAYED RELEASE ORAL at 18:30

## 2019-03-14 RX ADMIN — SODIUM CHLORIDE, POTASSIUM CHLORIDE, SODIUM LACTATE AND CALCIUM CHLORIDE: 600; 310; 30; 20 INJECTION, SOLUTION INTRAVENOUS at 07:45

## 2019-03-14 RX ADMIN — Medication 2 MG: at 08:59

## 2019-03-14 RX ADMIN — FENTANYL CITRATE 50 MCG: 50 INJECTION INTRAMUSCULAR; INTRAVENOUS at 09:20

## 2019-03-14 RX ADMIN — CEFAZOLIN SODIUM 2 G: 2 INJECTION, SOLUTION INTRAVENOUS at 10:24

## 2019-03-14 RX ADMIN — MIDAZOLAM 2 MG: 1 INJECTION INTRAMUSCULAR; INTRAVENOUS at 07:58

## 2019-03-14 RX ADMIN — ACETAMINOPHEN 1000 MG: 500 TABLET, FILM COATED ORAL at 06:48

## 2019-03-14 RX ADMIN — ONDANSETRON 4 MG: 2 INJECTION INTRAMUSCULAR; INTRAVENOUS at 10:35

## 2019-03-14 RX ADMIN — FENTANYL CITRATE 75 MCG: 50 INJECTION INTRAMUSCULAR; INTRAVENOUS at 10:46

## 2019-03-14 RX ADMIN — ROPIVACAINE HYDROCHLORIDE 30 ML: 5 INJECTION, SOLUTION EPIDURAL; INFILTRATION; PERINEURAL at 08:10

## 2019-03-14 RX ADMIN — HYDROCHLOROTHIAZIDE 25 MG: 25 TABLET ORAL at 13:02

## 2019-03-14 RX ADMIN — TRANEXAMIC ACID 1000 MG: 100 INJECTION, SOLUTION INTRAVENOUS at 09:23

## 2019-03-14 RX ADMIN — CLINDAMYCIN PHOSPHATE 900 MG: 900 INJECTION, SOLUTION INTRAVENOUS at 13:02

## 2019-03-14 RX ADMIN — MUPIROCIN: 20 OINTMENT TOPICAL at 21:48

## 2019-03-14 RX ADMIN — FAMOTIDINE 20 MG: 10 INJECTION INTRAVENOUS at 07:53

## 2019-03-14 RX ADMIN — FENTANYL CITRATE 25 MCG: 50 INJECTION INTRAMUSCULAR; INTRAVENOUS at 09:45

## 2019-03-14 RX ADMIN — LIDOCAINE HYDROCHLORIDE 80 MG: 20 INJECTION, SOLUTION INFILTRATION; PERINEURAL at 09:02

## 2019-03-14 NOTE — DISCHARGE PLACEMENT REQUEST
"Ashley Romero (63 y.o. Female)     Date of Birth Social Security Number Address Home Phone MRN    1955  [id number] [address] [phone] [id number]    Church Marital Status          Gnosticism        Admission Date Admission Type Admitting Provider Attending Provider Department, Room/Bed    3/14/19 Elective Estevan Esquivel MD Goodin, Robert A, MD [address], P792/1    Discharge Date Discharge Disposition Discharge Destination                       Attending Provider:  Estevan Esquivel MD    Allergies:  Codeine    Isolation:  None   Infection:  None   Code Status:  CPR    Ht:  154.9 cm (61\")   Wt:  102 kg (225 lb 8 oz)    Admission Cmt:  None   Principal Problem:  None                Active Insurance as of 3/14/2019     Primary Coverage     Payor Plan Insurance Group Employer/Plan Group    HUMANA HUMANA [id number]     Payor Plan Address Payor Plan Phone Number Payor Plan Fax Number Effective Dates    [address] [address] [phone]  8/1/2018 - None Entered    [facility] [id number]       Subscriber Name Subscriber Birth Date Member ID       KO ROMERO [birth year]1963 [id number]                 Emergency Contacts      (Rel.) Home Phone Work Phone Mobile Phone    Haile Romero (Spouse) -- -- [phone]              "

## 2019-03-14 NOTE — ANESTHESIA POSTPROCEDURE EVALUATION
Patient: Ashley Romero    Procedure Summary     Date:  03/14/19 Room / Location:  [facility] OR [address] [place] [address] MAIN OR    Anesthesia Start:  0858 Anesthesia Stop:  1051    Procedure:  RIGHT TOTAL KNEE ARTHROPLASTY (Right Knee) Diagnosis:      Surgeon:  Estevan Esquivel MD Provider:  Abigail Arreola MD    Anesthesia Type:  general ASA Status:  3          Anesthesia Type: general  Last vitals  BP   168/84 (03/14/19 1110)   Temp   37.1 °C (98.7 °F) (03/14/19 1049)   Pulse   80 (03/14/19 1110)   Resp   14 (03/14/19 1110)     SpO2   (!) 89 % (03/14/19 1110)     Post Anesthesia Care and Evaluation    Patient location during evaluation: PACU  Anesthetic complications: No anesthetic complications

## 2019-03-14 NOTE — ANESTHESIA PROCEDURE NOTES
Peripheral Block      Patient location during procedure: holding area  Start time: 3/14/2019 8:05 AM  Stop time: 3/14/2019 8:10 AM  Reason for block: at surgeon's request and post-op pain management  Performed by  Anesthesiologist: Abigail Arreola MD  Preanesthetic Checklist  Completed: patient identified, site marked, surgical consent, pre-op evaluation, timeout performed, IV checked, risks and benefits discussed and monitors and equipment checked  Prep:  Pt Position: supine  Sterile barriers:gloves  Prep: ChloraPrep  Patient monitoring: continuous pulse oximetry  Procedure  Sedation:yes    Guidance:ultrasound guided  ULTRASOUND INTERPRETATION. Using ultrasound guidance a 20 G gauge needle was placed in close proximity to the nerve, at which point, under ultrasound guidance anesthetic was injected in the area of the nerve and spread of the anesthesia was seen on ultrasound in close proximity thereto.  There were no abnormalities seen on ultrasound; a digital image was taken; and the patient tolerated the procedure with no complications. Images:still images obtained    Laterality:right  Block Type:adductor canal block  Injection Technique:single-shot  Needle Type:echogenic  Needle Gauge:20 G    Medications Used: ropivacaine (NAROPIN) 0.5 % injection, 30 mL  Post Assessment  Injection Assessment: negative aspiration for heme, no paresthesia on injection and incremental injection  Patient Tolerance:comfortable throughout block  Complications:no

## 2019-03-14 NOTE — THERAPY EVALUATION
Acute Care - Physical Therapy Initial Evaluation  [facility]     Patient Name: Ashley Romero  : 1955  MRN: [id number]  Today's Date: 3/14/2019   Onset of Illness/Injury or Date of Surgery: 19  Date of Referral to PT: 19  Referring Physician: Estevan Dennison      Admit Date: 3/14/2019    Visit Dx:     ICD-10-CM ICD-9-CM   1. Difficulty walking R26.2 719.7     Patient Active Problem List   Diagnosis   • OA (osteoarthritis) of knee     Past Medical History:   Diagnosis Date   • Arthritis    • Owens esophagus    • Depression    • GERD (gastroesophageal reflux disease)    • History of ulcer disease    • Hyperlipidemia    • Hypertension    • Right knee pain    • Sleep apnea     CPAP     Past Surgical History:   Procedure Laterality Date   • COLONOSCOPY     • ENDOSCOPY     • TONSILLECTOMY     • TUBAL ABDOMINAL LIGATION          PT ASSESSMENT (last 12 hours)      Physical Therapy Evaluation     Row Name 19 3878          PT Evaluation Time/Intention    Subjective Information  complains of;fatigue;pain  -MS     Document Type  evaluation  -MS     Mode of Treatment  physical therapy;individual therapy  -MS     Patient Effort  good  -MS     Row Name 19 0812          General Information    Onset of Illness/Injury or Date of Surgery  19  -MS     Referring Physician  Estevan Dennison  -MS     Patient Observations  agree to therapy;cooperative  -MS     Prior Level of Function  independent:  -MS     Equipment Currently Used at Home  none  -MS     Pertinent History of Current Functional Problem  Pt. is s/p Right TKR  -MS     Existing Precautions/Restrictions  fall  (Significant)   -MS     Equipment Ordered for Patient  -- Pt. reports she already owns a Rwx for home use.  -MS     Risks Reviewed  patient:  -MS     Benefits Reviewed  patient:  -MS     Barriers to Rehab  none identified  -MS     Row Name 19 9682          Cognitive Assessment/Intervention- PT/OT    Orientation Status  (Cognition)  oriented x 3  -MS     Follows Commands (Cognition)  WNL  -MS     Personal Safety Interventions  fall prevention program maintained;gait belt;supervised activity;nonskid shoes/slippers when out of bed  -MS     Row Name 03/14/19 1657          Mobility Assessment/Treatment    Extremity Weight-bearing Status  right lower extremity  -MS     Right Lower Extremity (Weight-bearing Status)  weight-bearing as tolerated (WBAT)  -MS     Row Name 03/14/19 1657          Bed Mobility Assessment/Treatment    Bed Mobility Assessment/Treatment  supine-sit;sit-supine  -MS     Supine-Sit [place] (Bed Mobility)  supervision  -MS     Row Name 03/14/19 1657          Transfer Assessment/Treatment    Transfer Assessment/Treatment  sit-stand transfer;stand-sit transfer  -MS     Sit-Stand [place] (Transfers)  contact guard  -MS     Stand-Sit [place] (Transfers)  contact guard  -MS     Row Name 03/14/19 1657          Sit-Stand Transfer    Assistive Device (Sit-Stand Transfers)  walker, front-wheeled  -MS     Row Name 03/14/19 1657          Stand-Sit Transfer    Assistive Device (Stand-Sit Transfers)  walker, front-wheeled  -MS     Row Name 03/14/19 1657          Gait/Stairs Assessment/Training    [place] Level (Gait)  minimum assist (75% patient effort);1 person to manage equipment  -MS     Assistive Device (Gait)  walker, front-wheeled  -MS     Distance in Feet (Gait)  6 feet  -MS     Pattern (Gait)  step-through  -MS     Deviations/Abnormal Patterns (Gait)  antalgic  -MS     Bilateral Gait Deviations  knee buckling, right side  -MS     Comment (Gait/Stairs)  Limited in gait distance due to Right knee buckling with wt. bearing   -MS     Row Name 03/14/19 1657          General ROM    GENERAL ROM COMMENTS  BUE/LLE (WFL's)  -MS     Row Name 03/14/19 1657          MMT (Manual Muscle Testing)    General MMT Comments  BUE/LLE (3+/5)  -MS     Row Name 03/14/19 1657          Therapeutic Exercise    Comment  (Therapeutic Exercise)  Right TKR Protocol x 10 reps completed  -MS     Row Name 03/14/19 1657          Pain Assessment    Additional Documentation  Pain Scale: Numbers Pre/Post-Treatment (Group)  -MS     [facility] Name 03/14/19 1657          Pain Scale: Numbers Pre/Post-Treatment    Pain Scale: Numbers, Pretreatment  4/10  -MS     Pain Scale: Numbers, Post-Treatment  4/10  -MS     Pain Location - Side  Right  -MS     Pain Location  knee  -MS     Pain Intervention(s)  Medication (See MAR);Cold applied;Repositioned;Elevated;Rest  -MS     Row Name             Wound 03/14/19 0946 Right leg incision    Wound - Properties Group Date first assessed: 03/14/19  -AS Time first assessed: 0946  -AS Side: Right  -AS Location: leg  -AS Type: incision  -AS    [facility] Name 03/14/19 1657          Physical Therapy Clinical Impression    Date of Referral to PT  03/14/19  -MS     Criteria for Skilled Interventions Met (PT Clinical Impression)  treatment indicated  -MS     Rehab Potential (PT Clinical Summary)  good, to achieve stated therapy goals  -MS     [facility] Name 03/14/19 1657          Physical Therapy Goals    Transfer Goal Selection (PT)  transfer, PT goal 1  -MS     Gait Training Goal Selection (PT)  gait training, PT goal 1  -MS     ROM Goal Selection (PT)  ROM, PT goal 1  -MS     Stairs Goal Selection (PT)  stairs, PT goal 1  -MS     Additional Documentation  ROM Goal Selection (PT) (Row);Stairs Goal Selection (PT) (Row)  -MS     Row Name 03/14/19 1657          Transfer Goal 1 (PT)    Activity/Assistive Device (Transfer Goal 1, PT)  transfers, all;walker, rolling  -MS     [place] Level/Cues Needed (Transfer Goal 1, PT)  independent  -MS     Time Frame (Transfer Goal 1, PT)  long term goal (LTG);2 - 3 days  -MS     Row Name 03/14/19 1657          Gait Training Goal 1 (PT)    Activity/Assistive Device (Gait Training Goal 1, PT)  gait (walking locomotion);walker, rolling  -MS     [place] Level (Gait Training Goal 1, PT)   independent  -MS     Distance (Gait Goal 1, PT)  100 feet  -MS     Time Frame (Gait Training Goal 1, PT)  long term goal (LTG);2 - 3 days  -MS     Row Name 03/14/19 1657          ROM Goal 1 (PT)    ROM Goal 1 (PT)  Right knee AROM (5, 85)  -MS     Time Frame (ROM Goal 1, PT)  long term goal (LTG);2 - 3 days  -MS     Row Name 03/14/19 1657          Stairs Goal 1 (PT)    Activity/Assistive Device (Stairs Goal 1, PT)  stairs, all skills  -MS     [place] Level/Cues Needed (Stairs Goal 1, PT)  contact guard assist  -MS     Number of Stairs (Stairs Goal 1, PT)  2 No Handrails  -MS     Time Frame (Stairs Goal 1, PT)  long term goal (LTG);3 days  -MS     Row Name 03/14/19 1657          Positioning and Restraints    Pre-Treatment Position  in bed  -MS     Post Treatment Position  chair  -MS     In Chair  notified nsg;reclined;sitting;call light within reach;encouraged to call for assist;with family/caregiver All lines intact. Ice pack to Right knee.   -MS       User Key  (r) = Recorded By, (t) = Taken By, (c) = Cosigned By    Initials Name Provider Type    MS Daniel Franks, PT Physical Therapist    AS Meghana Carr RN Registered Nurse        Physical Therapy Education     Title: PT OT SLP Therapies (Done)     Topic: Physical Therapy (Done)     Point: Mobility training (Done)     Learning Progress Summary           Patient Acceptance, E,D, VU,NR by MS at 3/14/2019  5:02 PM                   Point: Home exercise program (Done)     Learning Progress Summary           Patient Acceptance, E,D, VU,NR by MS at 3/14/2019  5:02 PM                   Point: Body mechanics (Done)     Learning Progress Summary           Patient Acceptance, E,D, VU,NR by MS at 3/14/2019  5:02 PM                   Point: Precautions (Done)     Learning Progress Summary           Patient Acceptance, E,D, VU,NR by MS at 3/14/2019  5:02 PM                               User Key     Initials Effective Dates Name Provider Type Discipline    MS  04/03/18 -  Daniel Franks PT Physical Therapist PT              PT Recommendation and Plan  Anticipated Discharge Disposition (PT): home with assist, home with home health  Planned Therapy Interventions (PT Eval): balance training, bed mobility training, gait training, home exercise program, patient/family education, postural re-education, ROM (range of motion), stair training, strengthening, transfer training  Therapy Frequency (PT Clinical Impression): 2 times/day  Outcome Summary/Treatment Plan (PT)  Anticipated Discharge Disposition (PT): home with assist, home with home health  Plan of Care Reviewed With: patient, family  Outcome Summary: Pt. presents with typical post op impairments related to TKR that include decreased strength, ROM, and balance.  Pt. will benefit from skilled inpt. P.T. to address her functional deficits and to assist pt. in regaining her independence with functional mobility.  Outcome Measures     Row Name 03/14/19 1700             How much help from another person do you currently need...    Turning from your back to your side while in flat bed without using bedrails?  4  -MS      Moving from lying on back to sitting on the side of a flat bed without bedrails?  4  -MS      Moving to and from a bed to a chair (including a wheelchair)?  3  -MS      Standing up from a chair using your arms (e.g., wheelchair, bedside chair)?  3  -MS      Climbing 3-5 steps with a railing?  2  -MS      To walk in hospital room?  3  -MS      AM-PAC 6 Clicks Score  19  -MS         Functional Assessment    Outcome Measure Options  AM-PAC 6 Clicks Basic Mobility (PT)  -MS        User Key  (r) = Recorded By, (t) = Taken By, (c) = Cosigned By    Initials Name Provider Type    Daniel Reeves, PT Physical Therapist         Time Calculation:   PT Charges     Row Name 03/14/19 1703             Time Calculation    Start Time  1550  -MS      Stop Time  1612  -MS      Time Calculation (min)  22 min  -MS      PT  Received On  03/14/19  -MS      PT - Next Appointment  03/15/19  -MS      PT Goal Re-Cert Due Date  03/16/19  -MS         Time Calculation- PT    Total Timed Code Minutes- PT  18 minute(s)  -MS        User Key  (r) = Recorded By, (t) = Taken By, (c) = Cosigned By    Initials Name Provider Type    Daniel Reeves, PT Physical Therapist        Therapy Suggested Charges     Code   Minutes Charges    None           Therapy Charges for Today     Code Description Service Date Service Provider Modifiers Qty    59283305643 HC PT EVAL LOW COMPLEXITY 1 3/14/2019 Daniel Franks, PT GP 1    30279518017 HC PT THER PROC EA 15 MIN 3/14/2019 Daniel Franks, PT GP 1    69587434138 HC PT THER SUPP EA 15 MIN 3/14/2019 Daniel Franks, PT GP 1          PT G-Codes  Outcome Measure Options: AM-PAC 6 Clicks Basic Mobility (PT)  AM-PAC 6 Clicks Score: 19      Daniel Franks, PT  3/14/2019

## 2019-03-14 NOTE — PLAN OF CARE
Problem: Patient Care Overview  Goal: Plan of Care Review  Outcome: Ongoing (interventions implemented as appropriate)   03/14/19 1832   Coping/Psychosocial   Plan of Care Reviewed With patient   OTHER   Outcome Summary Patient received from pacu s/p right TKA. Vitals wnl and voiding function intact. Transferring well using walker x1 assist. Pain has been minimal. Discussed bp monitoring and med management r/t hx htn.    Plan of Care Review   Progress improving       Problem: Fall Risk (Adult)  Goal: Absence of Fall  Outcome: Ongoing (interventions implemented as appropriate)   03/14/19 1832   Fall Risk (Adult)   Absence of Fall making progress toward outcome       Problem: Knee Arthroplasty (Total, Partial) (Adult)  Goal: Anesthesia/Sedation Recovery  Outcome: Ongoing (interventions implemented as appropriate)   03/14/19 1832   Goal/Outcome Evaluation   Anesthesia/Sedation Recovery progressing toward baseline

## 2019-03-14 NOTE — PLAN OF CARE
Problem: Patient Care Overview  Goal: Plan of Care Review   03/14/19 0547   Coping/Psychosocial   Plan of Care Reviewed With patient;family   OTHER   Outcome Summary Pt. presents with typical post op impairments related to TKR that include decreased strength, ROM, and balance. Pt. will benefit from skilled inpt. P.T. to address her functional deficits and to assist pt. in regaining her independence with functional mobility.

## 2019-03-14 NOTE — OP NOTE
Operative Note    Patient Name:  Ashley Romero  YOB: 1955  Medical Records Number:  [id number]    Date of Procedure:  3/14/2019    Pre-operative Diagnosis:  Primary Osteoarthritis Right Knee    Post-operative Diagnosis:  Primary Osteoarthritis Right Knee    Procedure Performed:  Right Total Knee Arthroplasty    Implants:  Biomet Vanguard TKA, 62.5 Femoral Component, 67 Tibial Tray with a 40 mm Modular Stem, 34 3-Peg Patella, 11 std Polyethylene Insert    Surgeon:  Estevan Esquivel M.D.    Assistant: Kasey Gee (who was present during the critical portions of the case, thereby decreasing operative time and patient morbidity)    Anesthetic Type:  General    Estimated Blood Loss:  250cc's    Specimens: * No orders in the log *    No Complications      Indications for Procedure:  Ashley Romero is a 63 y.o. female suffering from end stage degenerative changes in the right knee.  The patients pain is becoming disabling, despite extensive conservative care, including NSAIDS, therapy and injections.  The risks, benefits and alternatives were discussed and the patient wishes to proceed with right total knee arthroplasty.      Procedure Performed:    After informed consent was obtained and pre-operative IV Kefzol given, prior to tourniquet inflation, the patient was taken to the operating room and placed supine on the operating table.  After general anesthesia induced and 1 gm of Tranxemic Acid given,the patient's right lower extremity was prepped with ChloraPrep and draped in a sterile fashion.    A midline incision was made overlying the right knee and we sharply dissected down to expose the parapatellar retinaculum.  A mid-vastus, muscle sparing, parapatellar arthrotomy was performed and we elevated the soft tissue both medially and laterally.  The menisci and ACL were excised.  We everted the patella and measured this to be 24 mm and we removed 9 mm of bone down to 15mm.  We measured the patella to be  34 and drilled our three drill holes.  We protected the patella with the patella protector and turned our attention to the femur and the tibia.    We drilled drill holes into the femoral and the tibial intramedullary canals and proceeded to irrigate the canals to remove the fatty marrow.  We used the intramedullary ruchi and the 5 degree valgus cut block to make our distal femoral cut.  Once we had a smooth surface, we measured the femur to be 62.5.  We assured we had rotational alignment using the epicondylar axis, then we used the four-in-one cut block to make our anterior, posterior, anterior and posterior chamfer cuts.  We placed our femoral trial, had excellent fit, and extended the knee and marked [place]'s line on the tibia.      We then turned our attention to the tibia, where we irrigated the canal again.  We used the intramedullary ruchi and the 3 degree posterior sloped cut block to remove 2 mm of bone from the effected side of the tibia.  Prior to making our cut, we assured we had rotational alignment using the external guide and [place]'s line.  Once we had a smooth surface, we measured the tibia to be 67.  We then removed soft tissue and bony debris from the posterior aspect of the knee.    We placed our trial implants and had excellent fit, range of motion, stability and ligament balance, throughout a complete arc of motion with a 12 std polyethylene liner.  We removed our trial implants, punched the tibial keel, copiously irrigated the knee, then cemented our implants in place using [place] cement.  Once the cement cured, we trialed again with the 10 and 12 standard and posterior lipped polyethylene liners.  The 10's were too loose and the 12's were too tight, so we chose an 11 mm std.  The 11 std gave us the best range of motion, stability and ligament balance, throughout a complete arc of motion.  We removed the trials, copiously irrigated the knee, gave IV antibiotics and local anesthetic, then  "placed our permanent polyethylene liner.  We placed a 1/8\" hemovac drain, then closed the arthrotomy with #1 Vicryl pop-off sutures.  The subcutaneous tissue was closed with 2-0 vicryl pop-off sutures.  The skin was closed with staples.  We placed a sterile dressing of Xeroform, 4x4's, abdominal pads, cast padding and an Ace Wrap.  The patient was then awakened from general anesthesia and taken to the recovery room in stable condition.    The patient will be started on Aspirin 325mg twice daily for DVT prophylaxis.  IV antibiotics will be discontinued within 24 hours of surgery.  Immediately prior to surgery, there were no acute Thromboembolic nor Cardiovascular risk factors.  An updated Medical Reconciliation form is on the chart.  "

## 2019-03-14 NOTE — ANESTHESIA PROCEDURE NOTES
Airway  Urgency: elective    Date/Time: 3/14/2019 9:03 AM  Airway not difficult    General Information and Staff    Patient location during procedure: OR  Anesthesiologist: Abigail Arreola MD  CRNA: Marvin Andre CRNA    Indications and Patient Condition  Indications for airway management: airway protection    Preoxygenated: yes  MILS not maintained throughout  Mask difficulty assessment: 1 - vent by mask    Final Airway Details  Final airway type: supraglottic airway      Successful airway: unique  Size 4    Number of attempts at approach: 1    Additional Comments  Pre O2, SIAI

## 2019-03-14 NOTE — H&P
"History and Physical    Patient Name:  Ashley Romero  YOB: 1955    Medical Records Number:  [id number]    Date of Admission:  3/14/2019  6:14 AM    Chief Complaint:  OA (osteoarthritis) of knee [M17.10]    Ashley Romero is a 63 y.o. female who presents c/o severe right knee pain.  The pain has been on and off for many years, worsening to the point where the pain is becoming disabling.  The pain is a constant dull ache with occasional sharp, stabbing pain.  The patient has failed conservative treatment and would like to proceed with right total knee arthroplasty.    /85 (BP Location: Right arm, Patient Position: Lying)   Pulse 73   Temp 98.2 °F (36.8 °C) (Oral)   Resp 18   Ht 154.9 cm (61\")   Wt 102 kg (225 lb 8 oz)   SpO2 96%   BMI 42.61 kg/m²     Past Medical History:    Past Medical History:   Diagnosis Date   • Arthritis    • Owens esophagus    • Depression    • GERD (gastroesophageal reflux disease)    • History of ulcer disease    • Hyperlipidemia    • Hypertension    • Right knee pain    • Sleep apnea     CPAP       Social History:    Social History     Socioeconomic History   • Marital status:      Spouse name: Not on file   • Number of children: Not on file   • Years of education: Not on file   • Highest education level: Not on file   Social Needs   • Financial resource strain: Not on file   • Food insecurity - worry: Not on file   • Food insecurity - inability: Not on file   • Transportation needs - medical: Not on file   • Transportation needs - non-medical: Not on file   Occupational History   • Not on file   Tobacco Use   • Smoking status: Never Smoker   • Smokeless tobacco: Never Used   Substance and Sexual Activity   • Alcohol use: Yes     Comment: OCCASIONALLY   • Drug use: No   • Sexual activity: Not on file   Other Topics Concern   • Not on file   Social History Narrative   • Not on file       Family History:    Family History   Problem Relation Age of Onset "   • Malig Hyperthermia Neg Hx        Current Medications:    Current Facility-Administered Medications:   •  acetaminophen (TYLENOL) tablet 1,000 mg, 1,000 mg, Oral, Once, Estevan Esquivel MD  •  ceFAZolin in dextrose (ANCEF) IVPB solution 2 g, 2 g, Intravenous, Once, Estevan Esquivel MD  •  celecoxib (CeleBREX) capsule 200 mg, 200 mg, Oral, Once, Estevan Esquivel MD  •  ropivacaine (NAROPIN) 0.5 % 50 mL, ketorolac (TORADOL) 30 mg, EPINEPHrine PF (ADRENALIN) 0.3 mg in sodium chloride 0.9 % 101.3 mL, , Injection, Once, Estevan Esquivel MD    Allergies:    Allergies   Allergen Reactions   • Codeine Nausea Only       Review of Systems:   HEENT: Patient denies any headaches, vision changes, change in hearing, or tinnitus, Patient denies any rhinorrhea,epistaxis, sinus pain, mouth or dental problems, sore throat or hoarseness, or dysphagia  Pulmonary: Patient denies any cough, congestion, SOA, or wheezing  Cardiovascular: Patient denies any chest pain, dyspnea, palpitations, weakness, intolerance of exercise, varicosities, swelling of extremities, known murmur  Gastrointestinal:  Patient denies nausea, vomiting, diarrhea, constipation, loss  of appetite, change in appetite, dysphagia, gas, heartburn, melena, change in bowel habits, use of laxatives or other drugs to alter the function of the gastrointestinal tract.  Genital/Urinary: Patient denies dysuria, change in color of urine, change in frequency of urination, pain with urgency, incontinence, retention, or nocturia.  Musculoskeletal: Patient denies increased warmth; redness; or swelling of joints; limitation of function; deformity; crepitation: pain in a joint or an extremity, the neck, or the back, especially with movement.  Neurological: Patient denies dizziness, tremor, ataxia, difficulty in speaking, change in speech, paresthesia, loss of sensation, seizures, syncope, changes in memory.  Endocrine system: Patient denies tremors, palpitations, intolerance of  heat or cold, polyuria, polydipsia, polyphagia, diaphoresis, exophthalmos, or goiter.  Psychological: Patient denies thoughts/plans or harming self or other; depression,  insomnia, night terrors, kaz, memory loss, disorientation.  Skin: Patient denies any bruising, rashes, discoloration, pruritus, wounds, ulcers, decubiti, changes in the hair or nails  Hematopoietic: Patient denies history of spontaneous or excessive bleeding, epistaxis, hematuria, melena, fatigue, enlarged or tender lymph nodes, pallor, history of anemia.      Physical Exam:   Awake, A&O x3, affect normal, no acute distress  Ambulating with a limp due to knee pain  Knee ROM is limited due to pain (5-115)  Strength is 4/5 in the quad, hamstring and calf  Cap refill is normal, Sensation intact    Card:  RR, HD Stable  Pulm:  Regular breathing, no S.O.A  Abd:  Soft, NT, ND    Lab Results (last 24 hours)     ** No results found for the last 24 hours. **          Xr Knee 1 Or 2 View Right    Result Date: 3/7/2019  Narrative: XR CHEST PA AND LATERAL-, XR KNEE 1 OR 2 VIEW RIGHT-  HISTORY: 63-year-old female. Preoperative evaluation.  FINDINGS: RIGHT KNEE: There are advanced osteoarthritic changes most prominent at the medial tibiofemoral compartment. There is no joint effusion or acute abnormality.  CHEST: There is underexpansion of the lung fields with crowding of lung markings. No airspace consolidations are seen to suggest pneumonia and there is no convincing evidence for effusions or CHF. No acute abnormality is seen.  This report was finalized on 3/7/2019 4:22 PM by Dr. Rhona Russell M.D.      Xr Chest Pa & Lateral    Result Date: 3/7/2019  Narrative: XR CHEST PA AND LATERAL-, XR KNEE 1 OR 2 VIEW RIGHT-  HISTORY: 63-year-old female. Preoperative evaluation.  FINDINGS: RIGHT KNEE: There are advanced osteoarthritic changes most prominent at the medial tibiofemoral compartment. There is no joint effusion or acute abnormality.  CHEST: There is  underexpansion of the lung fields with crowding of lung markings. No airspace consolidations are seen to suggest pneumonia and there is no convincing evidence for effusions or CHF. No acute abnormality is seen.  This report was finalized on 3/7/2019 4:22 PM by Dr. Rhona Russell M.D.        Assessment:  End-stage Primary Right Knee Osteoarthritis    Plan:  Patient's pain is becoming disabling, despite extensive conservative treatment.  Radiographs reveal end-stage degenerative changes.  The risks of surgery, including, but not limited to, heart attack, stroke, dying, DVT, nerve injury, vascular injury, arthrofibrosis and infection were discussed.  The alternatives and benefits were also discussed.  All questions answered and the patient wishes to proceed with right total knee arthroplasty.

## 2019-03-15 VITALS
RESPIRATION RATE: 18 BRPM | BODY MASS INDEX: 42.57 KG/M2 | WEIGHT: 225.5 LBS | DIASTOLIC BLOOD PRESSURE: 57 MMHG | SYSTOLIC BLOOD PRESSURE: 136 MMHG | TEMPERATURE: 98.4 F | HEIGHT: 61 IN | OXYGEN SATURATION: 98 % | HEART RATE: 69 BPM

## 2019-03-15 LAB
ANION GAP SERPL CALCULATED.3IONS-SCNC: 14.1 MMOL/L
BUN BLD-MCNC: 16 MG/DL (ref 8–23)
BUN/CREAT SERPL: 26.2 (ref 7–25)
CALCIUM SPEC-SCNC: 8.8 MG/DL (ref 8.6–10.5)
CHLORIDE SERPL-SCNC: 100 MMOL/L (ref 98–107)
CO2 SERPL-SCNC: 23.9 MMOL/L (ref 22–29)
CREAT BLD-MCNC: 0.61 MG/DL (ref 0.57–1)
DEPRECATED RDW RBC AUTO: 42.5 FL (ref 37–54)
ERYTHROCYTE [DISTWIDTH] IN BLOOD BY AUTOMATED COUNT: 13.5 % (ref 12.3–15.4)
GFR SERPL CREATININE-BSD FRML MDRD: 99 ML/MIN/1.73
GLUCOSE BLD-MCNC: 115 MG/DL (ref 65–99)
HCT VFR BLD AUTO: 33.8 % (ref 34–46.6)
HGB BLD-MCNC: 10.8 G/DL (ref 12–15.9)
MCH RBC QN AUTO: 28 PG (ref 26.6–33)
MCHC RBC AUTO-ENTMCNC: 32 G/DL (ref 31.5–35.7)
MCV RBC AUTO: 87.6 FL (ref 79–97)
PLATELET # BLD AUTO: 245 10*3/MM3 (ref 140–450)
PMV BLD AUTO: 10.2 FL (ref 6–12)
POTASSIUM BLD-SCNC: 3.4 MMOL/L (ref 3.5–5.2)
RBC # BLD AUTO: 3.86 10*6/MM3 (ref 3.77–5.28)
SODIUM BLD-SCNC: 138 MMOL/L (ref 136–145)
WBC NRBC COR # BLD: 9.38 10*3/MM3 (ref 3.4–10.8)

## 2019-03-15 PROCEDURE — 85027 COMPLETE CBC AUTOMATED: CPT | Performed by: ORTHOPAEDIC SURGERY

## 2019-03-15 PROCEDURE — G0378 HOSPITAL OBSERVATION PER HR: HCPCS

## 2019-03-15 PROCEDURE — 97150 GROUP THERAPEUTIC PROCEDURES: CPT

## 2019-03-15 PROCEDURE — 80048 BASIC METABOLIC PNL TOTAL CA: CPT | Performed by: ORTHOPAEDIC SURGERY

## 2019-03-15 PROCEDURE — 97110 THERAPEUTIC EXERCISES: CPT

## 2019-03-15 RX ORDER — PSEUDOEPHEDRINE HCL 30 MG
100 TABLET ORAL 2 TIMES DAILY PRN
Qty: 30 EACH | Refills: 1 | Status: SHIPPED | OUTPATIENT
Start: 2019-03-15 | End: 2019-05-09

## 2019-03-15 RX ORDER — OXYCODONE HYDROCHLORIDE AND ACETAMINOPHEN 5; 325 MG/1; MG/1
1-2 TABLET ORAL EVERY 4 HOURS PRN
Qty: 84 TABLET | Refills: 0 | Status: SHIPPED | OUTPATIENT
Start: 2019-03-15 | End: 2019-08-17 | Stop reason: HOSPADM

## 2019-03-15 RX ADMIN — MUPIROCIN 10 APPLICATION: 20 OINTMENT TOPICAL at 08:09

## 2019-03-15 RX ADMIN — PANTOPRAZOLE SODIUM 40 MG: 40 TABLET, DELAYED RELEASE ORAL at 06:28

## 2019-03-15 RX ADMIN — ASPIRIN 325 MG: 325 TABLET, DELAYED RELEASE ORAL at 08:09

## 2019-03-15 RX ADMIN — OXYCODONE AND ACETAMINOPHEN 2 TABLET: 5; 325 TABLET ORAL at 08:09

## 2019-03-15 RX ADMIN — ATORVASTATIN CALCIUM 40 MG: 20 TABLET, FILM COATED ORAL at 08:09

## 2019-03-15 RX ADMIN — LOSARTAN POTASSIUM 100 MG: 100 TABLET, FILM COATED ORAL at 11:54

## 2019-03-15 RX ADMIN — PAROXETINE HYDROCHLORIDE 20 MG: 20 TABLET, FILM COATED ORAL at 08:09

## 2019-03-15 RX ADMIN — HYDROCHLOROTHIAZIDE 25 MG: 25 TABLET ORAL at 11:54

## 2019-03-15 RX ADMIN — FERROUS SULFATE TAB 325 MG (65 MG ELEMENTAL FE) 325 MG: 325 (65 FE) TAB at 08:09

## 2019-03-15 RX ADMIN — Medication 3 ML: at 08:10

## 2019-03-15 RX ADMIN — AMLODIPINE BESYLATE 5 MG: 5 TABLET ORAL at 11:54

## 2019-03-15 RX ADMIN — OXYCODONE AND ACETAMINOPHEN 2 TABLET: 5; 325 TABLET ORAL at 11:53

## 2019-03-15 NOTE — PLAN OF CARE
Problem: Patient Care Overview  Goal: Plan of Care Review  Outcome: Ongoing (interventions implemented as appropriate)   03/15/19 1431   Coping/Psychosocial   Plan of Care Reviewed With patient   OTHER   Outcome Summary VSS. Dressing cdi. Pain well managed w/ po meds. Met all PT goals. Discussed BP monitoring and med management. Patient feels ready for discharge home w/ HH PT.   Plan of Care Review   Progress improving       Problem: Fall Risk (Adult)  Goal: Absence of Fall  Outcome: Outcome(s) achieved Date Met: 03/15/19   03/15/19 1431   Fall Risk (Adult)   Absence of Fall achieves outcome       Problem: Knee Arthroplasty (Total, Partial) (Adult)  Goal: Anesthesia/Sedation Recovery  Outcome: Ongoing (interventions implemented as appropriate)   03/15/19 1431   Goal/Outcome Evaluation   Anesthesia/Sedation Recovery criteria met for discharge

## 2019-03-15 NOTE — PROGRESS NOTES
"Ortho POD 1    Patients Name:  Ashley Romero  YOB: 1955  Medical Records Number:  [id number]    No complaints except pain    /66 (BP Location: Right arm, Patient Position: Lying)   Pulse 59   Temp 97.2 °F (36.2 °C) (Oral)   Resp 16   Ht 154.9 cm (61\")   Wt 102 kg (225 lb 8 oz)   SpO2 94%   BMI 42.61 kg/m²     RLE:  NVI, calf nontender, sensation intact  No signs of DVT    Incision: clean, intact    Lab Results (last 24 hours)     Procedure Component Value Units Date/Time    Basic Metabolic Panel [[id number]]  (Abnormal) Collected:  03/15/19 0422    Specimen:  Blood Updated:  03/15/19 0503     Glucose 115 mg/dL      BUN 16 mg/dL      Creatinine 0.61 mg/dL      Sodium 138 mmol/L      Potassium 3.4 mmol/L      Chloride 100 mmol/L      CO2 23.9 mmol/L      Calcium 8.8 mg/dL      eGFR Non African Amer 99 mL/min/1.73      BUN/Creatinine Ratio 26.2     Anion Gap 14.1 mmol/L     Narrative:       GFR Normal >60  Chronic Kidney Disease <60  Kidney Failure <15    CBC (No Diff) [[id number]]  (Abnormal) Collected:  03/15/19 0422    Specimen:  Blood Updated:  03/15/19 0455     WBC 9.38 10*3/mm3      RBC 3.86 10*6/mm3      Hemoglobin 10.8 g/dL      Hematocrit 33.8 %      MCV 87.6 fL      MCH 28.0 pg      MCHC 32.0 g/dL      RDW 13.5 %      RDW-SD 42.5 fl      MPV 10.2 fL      Platelets 245 10*3/mm3     Needle Stick Worker [[id number]] Collected:  03/14/19 1746    Specimen:  Blood Updated:  03/14/19 1910    Narrative:       The following orders were created for panel order Needle Stick Worker.  Procedure                               Abnormality         Status                     ---------                               -----------         ------                     Hepatitis C Antibody[[id number]]         Normal              Final result               ALT[[id number]]                          Normal              Final result               Hepatitis B Surface Antigen[[id number]]  Normal              Final " result               HIV-1 / O / 2 Ag / Antib...[[id number]]  Normal              Final result               Hepatitis B Surface Anti...[[id number]]  Normal              Final result                 Please view results for these tests on the individual orders.    HIV-1 / O / 2 Ag / Antibody 4th Generation [[id number]]  (Normal) Collected:  03/14/19 1746    Specimen:  Blood Updated:  03/14/19 1910     HIV-1/ HIV-2 Non-Reactive     Comment: A non-reactive test result does not preclude the possibility of exposure to HIV or infection with HIV. An antibody response to recent exposure may take several months to reach detectable levels.       Narrative:       The HIV antibody/antigen combo assay is a qualitative assay for HIV that includes the p24 antigen as well as antibodies to HIV types 1 and 2. This test is intended to be used as a screening assay in the diagnosis of HIV infection in patients over the age of 2.    Hepatitis C Antibody [[id number]]  (Normal) Collected:  03/14/19 1746    Specimen:  Blood Updated:  03/14/19 1901     Hepatitis C Ab Non-Reactive    Hepatitis B Surface Antigen [[id number]]  (Normal) Collected:  03/14/19 1746    Specimen:  Blood Updated:  03/14/19 1901     Hepatitis B Surface Ag Non-Reactive    Hepatitis B Surface Antibody [[id number]]  (Normal) Collected:  03/14/19 1746    Specimen:  Blood Updated:  03/14/19 1901     Hep B S Ab Non-Reactive    ALT [[id number]]  (Normal) Collected:  03/14/19 1746    Specimen:  Blood Updated:  03/14/19 1854     ALT (SGPT) 27 U/L           Xr Knee 1 Or 2 View Right    Result Date: 3/14/2019  Narrative: PORTABLE VIEWS OF THE RIGHT KNEE  CLINICAL HISTORY: Postop knee arthroplasty  AP and crosstable lateral views demonstrate a total knee arthroplasty that appears in satisfactory position.  This report was finalized on 3/14/2019 11:05 AM by Dr. Robb Andujar M.D.      Xr Knee 1 Or 2 View Right    Result Date: 3/7/2019  Narrative: XR CHEST PA AND LATERAL-, XR KNEE 1 OR 2  VIEW RIGHT-  HISTORY: 63-year-old female. Preoperative evaluation.  FINDINGS: RIGHT KNEE: There are advanced osteoarthritic changes most prominent at the medial tibiofemoral compartment. There is no joint effusion or acute abnormality.  CHEST: There is underexpansion of the lung fields with crowding of lung markings. No airspace consolidations are seen to suggest pneumonia and there is no convincing evidence for effusions or CHF. No acute abnormality is seen.  This report was finalized on 3/7/2019 4:22 PM by Dr. Rhona Russell M.D.      Xr Chest Pa & Lateral    Result Date: 3/7/2019  Narrative: XR CHEST PA AND LATERAL-, XR KNEE 1 OR 2 VIEW RIGHT-  HISTORY: 63-year-old female. Preoperative evaluation.  FINDINGS: RIGHT KNEE: There are advanced osteoarthritic changes most prominent at the medial tibiofemoral compartment. There is no joint effusion or acute abnormality.  CHEST: There is underexpansion of the lung fields with crowding of lung markings. No airspace consolidations are seen to suggest pneumonia and there is no convincing evidence for effusions or CHF. No acute abnormality is seen.  This report was finalized on 3/7/2019 4:22 PM by Dr. Rhona Russell M.D.        S/p Right TKA  PT-WBAT with walker  ASA for DVT prophylaxis  Home with home health after PT today, if comfortable

## 2019-03-15 NOTE — PROGRESS NOTES
Discharge Planning Assessment  [facility]     Patient Name: Ashley Romero  MRN: [id number]  Today's Date: 3/15/2019    Admit Date: 3/14/2019    Discharge Needs Assessment     Row Name 03/15/19 0951       Living Environment    Lives With  spouse    Current Living Arrangements  home/apartment/condo    Family Caregiver if Needed  spouse    Quality of Family Relationships  involved    Able to Return to Prior Arrangements  yes       Resource/Environmental Concerns    Resource/Environmental Concerns  none       Transition Planning    Patient/Family Anticipates Transition to  home with family    Patient/Family Anticipated Services at Transition  home health care;outpatient care    Transportation Anticipated  family or friend will provide       Discharge Needs Assessment    Readmission Within the Last 30 Days  no previous admission in last 30 days    Concerns to be Addressed  no discharge needs identified    Discharge Facility/Level of Care Needs  home with home health        Discharge Plan     Row Name 03/15/19 0952       Plan    Plan  Home w/ spouse and [facility]  in [place]     Patient/Family in Agreement with Plan  yes    Plan Comments  Facesheet and d/c plan verified. Pt to d/c home w/ spouse, she requested [facility] for in-home therapy in [place]. Mar[facility] notified and accepted.     Final Discharge Disposition Code  06 - home with home health care        Destination      No service coordination in this encounter.      Durable Medical Equipment      No service coordination in this encounter.      Dialysis/Infusion      No service coordination in this encounter.      Home Medical Care - Selection Complete      Service Provider Request Status Selected Services Address Phone Number Fax Number    [facility] Selected Home Health Services [address] [place] [address] [place] [address] [phone]      Community Resources      No service coordination in this encounter.        Expected  Discharge Date and Time     Expected Discharge Date Expected Discharge Time    Mar 15, 2019         Demographic Summary    No documentation.       Functional Status     Row Name 03/15/19 0952       Functional Status    Usual Activity Tolerance  good    Current Activity Tolerance  fair       Functional Status, IADL    Medications  independent    Meal Preparation  independent    Housekeeping  independent    Laundry  independent    Shopping  independent        Psychosocial    No documentation.       Abuse/Neglect    No documentation.       Legal    No documentation.       Substance Abuse    No documentation.       Patient Forms    No documentation.           Brittney Duran RN

## 2019-03-15 NOTE — PLAN OF CARE
Problem: Patient Care Overview  Goal: Plan of Care Review  Outcome: Ongoing (interventions implemented as appropriate)   03/15/19 0414   Coping/Psychosocial   Plan of Care Reviewed With patient   OTHER   Outcome Summary Pt tolerated treatment with c/o discomfort of right knee. Pt able to perform TKA protocol exercises with minimal c/o difficulty. Pt ambulated 50 feet with rwx, CGA. Pt demo ascending/descending 4 steps with rwx and no HRs, CGA.    Plan of Care Review   Progress improving

## 2019-03-15 NOTE — PLAN OF CARE
Problem: Patient Care Overview  Goal: Plan of Care Review  Outcome: Ongoing (interventions implemented as appropriate)   03/15/19 0312   Coping/Psychosocial   Plan of Care Reviewed With patient   OTHER   Outcome Summary VSS. Peripherl/neurovascular assessment WNL. Dressing CDI with patent hemovac. Ambulating with walker and assist X1-very slight RLE buckling at the knee observed. Voiding without problem. Pain managed well with oral pain medication. Verbalizes understanding of all education including monitoring b/p. PT compliant with wearing CPAP while asleep. PT would like to d/c home with home health today if stable   Plan of Care Review   Progress improving       Problem: Fall Risk (Adult)  Goal: Identify Related Risk Factors and Signs and Symptoms  Outcome: Outcome(s) achieved Date Met: 03/15/19    Goal: Absence of Fall  Outcome: Ongoing (interventions implemented as appropriate)      Problem: Knee Arthroplasty (Total, Partial) (Adult)  Goal: Signs and Symptoms of Listed Potential Problems Will be Absent, Minimized or Managed (Knee Arthroplasty)  Outcome: Ongoing (interventions implemented as appropriate)

## 2019-03-15 NOTE — THERAPY TREATMENT NOTE
Acute Care - Physical Therapy Treatment Note  [facility]     Patient Name: Ashley Romero  : 1955  MRN: [id number]  Today's Date: 3/15/2019  Onset of Illness/Injury or Date of Surgery: 19  Date of Referral to PT: 19  Referring Physician: Estevan Dennison    Admit Date: 3/14/2019    Visit Dx:    ICD-10-CM ICD-9-CM   1. Difficulty walking R26.2 719.7     Patient Active Problem List   Diagnosis   • OA (osteoarthritis) of knee       Therapy Treatment    Rehabilitation Treatment Summary     Row Name 03/15/19 1503 03/15/19 1224          Treatment Time/Intention    Discipline  physical therapy assistant  -EH  physical therapy assistant  -     Document Type  therapy note (daily note)  -  therapy note (daily note)  -     Subjective Information  complains of;pain  -  complains of discomfort  -     Mode of Treatment  physical therapy;group therapy  -  physical therapy;individual therapy  -     Care Plan Review  patient/other agree to care plan  -  patient/other agree to care plan  -     Therapy Frequency (PT Clinical Impression)  2 times/day  -  2 times/day  -     Patient Effort  good  -EH  good  -EH     Existing Precautions/Restrictions  fall  -EH  fall  -EH     Recorded by [] Silvia Scott PTA 03/15/19 1505 [] Silvia Scott PTA 03/15/19 1227     Row Name 03/15/19 1503 03/15/19 1224          Cognitive Assessment/Intervention- PT/OT    Orientation Status (Cognition)  oriented x 3  -EH  oriented x 3  -EH     Follows Commands (Cognition)  WNL  -EH  WNL  -EH     Personal Safety Interventions  gait belt;fall prevention program maintained;nonskid shoes/slippers when out of bed  -EH  fall prevention program maintained;gait belt;nonskid shoes/slippers when out of bed  -EH     Recorded by [] Silvia Scott PTA 03/15/19 1505 [] Silvia Scott PTA 03/15/19 1227     Row Name 03/15/19 1503 03/15/19 1224          Mobility Assessment/Intervention    Extremity Weight-bearing Status   right lower extremity  -EH  right lower extremity  -EH     Right Lower Extremity (Weight-bearing Status)  weight-bearing as tolerated (WBAT)  -EH  weight-bearing as tolerated (WBAT)  -EH     Recorded by [] Silvia Scott, hospitals 03/15/19 1505 [] Silvia Scott, hospitals 03/15/19 1227     Row Name 03/15/19 1224             Bed Mobility Assessment/Treatment    Bed Mobility Assessment/Treatment  --  -EH      Supine-Sit [place] (Bed Mobility)  --  -EH      Recorded by [] Silvia Scott, hospitals 03/15/19 1227      Row Name 03/15/19 1503 03/15/19 1224          Transfer Assessment/Treatment    Transfer Assessment/Treatment  sit-stand transfer;stand-sit transfer  -  sit-stand transfer;stand-sit transfer  -     Recorded by [] Silvia Scott, hospitals 03/15/19 1505 [] Silvia Scott, hospitals 03/15/19 1227     Row Name 03/15/19 1503 03/15/19 1224          Sit-Stand Transfer    Sit-Stand [place] (Transfers)  contact guard  -  contact guard  -     Assistive Device (Sit-Stand Transfers)  walker, front-wheeled  -EH  walker, front-wheeled  -EH     Recorded by [] Silvia Scott, hospitals 03/15/19 1505 [] Silvia Scott, hospitals 03/15/19 1227     Row Name 03/15/19 1503 03/15/19 1224          Stand-Sit Transfer    Stand-Sit [place] (Transfers)  contact guard  -  contact guard  -EH     Assistive Device (Stand-Sit Transfers)  walker, front-wheeled  -EH  walker, front-wheeled  -EH     Recorded by [] Silvia Scott, hospitals 03/15/19 1505 [] Silvia Scott, hospitals 03/15/19 1227     Row Name 03/15/19 1503 03/15/19 1224          Gait/Stairs Assessment/Training    [place] Level (Gait)  contact guard  -EH  contact guard  -EH     Assistive Device (Gait)  walker, front-wheeled  -EH  walker, front-wheeled  -EH     Distance in Feet (Gait)  60  -EH  50  -EH     Pattern (Gait)  step-through  -EH  step-through  -EH     Deviations/Abnormal Patterns (Gait)  antalgic  -EH  antalgic  -EH     Bilateral Gait Deviations  knee buckling, right side  -EH   knee buckling, right side  -EH     [place] Level (Stairs)  contact guard  -EH  contact guard  -EH     Assistive Device (Stairs)  walker, front-wheeled  -  walker, front-wheeled  -EH     Handrail Location (Stairs)  none  -EH  none  -EH     Number of Steps (Stairs)  4  -EH  4  -EH     Ascending Technique (Stairs)  step-to-step  -EH  step-to-step  -EH     Descending Technique (Stairs)  step-to-step  -EH  step-to-step  -EH     Comment (Gait/Stairs)  pt performed stairs again this afternoon as  present to learn also how to help pt with steps  -EH  --     Recorded by [] Silvia Scott, PTA 03/15/19 1505 [] Silvia Scott, [facility] 03/15/19 1227     Row Name 03/15/19 1224             Right Lower Ext    Rt Knee Extension/Flexion AROM  -13/93  -EH      Recorded by [] Silvia Scott, PTA 03/15/19 1227      Row Name 03/15/19 1503 03/15/19 1224          Therapeutic Exercise    Comment (Therapeutic Exercise)  Right TKR protocol X20 reps  -EH  Right TKR protocol X15 reps  -EH     Recorded by [] Silvia Scott, PTA 03/15/19 1505 [] Silvia Scott, [facility] 03/15/19 1227     Row Name 03/15/19 1503 03/15/19 1224          Positioning and Restraints    Pre-Treatment Position  sitting in chair/recliner  -  sitting in chair/recliner  -     Post Treatment Position  chair  -  chair  -EH     In Chair  reclined;encouraged to call for assist;exit alarm on  -EH  reclined;call light within reach;encouraged to call for assist  -     Recorded by [] Silvia Scott, PTA 03/15/19 1505 [EH] Silvia Scott, PTA 03/15/19 1227     Row Name 03/15/19 1503 03/15/19 1224          Pain Scale: Numbers Pre/Post-Treatment    Pain Scale: Numbers, Pretreatment  5/10  -EH  0/10 - no pain  -     Pain Scale: Numbers, Post-Treatment  --  --  -EH     Pain Location - Side  Right  -EH  Right  -EH     Pain Location  knee  -EH  knee  -EH     Pain Intervention(s)  Repositioned;Ambulation/increased activity  -  Cold  applied;Repositioned;Ambulation/increased activity  -EH     Recorded by [EH] Silvia Scott, PTA 03/15/19 1505 [EH] Silvia Scott PTA 03/15/19 1227     Row Name                Wound 03/14/19 0946 Right leg incision    Wound - Properties Group Date first assessed: 03/14/19 [AS] Time first assessed: 0946 [AS] Side: Right [AS] Location: leg [AS] Type: incision [AS] Recorded by:  [AS] Meghana Carr RN 03/14/19 0946    Row Name 03/15/19 1503 03/15/19 1224          Outcome Summary/Treatment Plan (PT)    Anticipated Discharge Disposition (PT)  home with assist;home with home health  -EH  home with assist;home with home health  -     Recorded by [EH] Silvia Scott PTA 03/15/19 1505 [EH] Silvia Scott PTA 03/15/19 1227       User Key  (r) = Recorded By, (t) = Taken By, (c) = Cosigned By    Initials Name Effective Dates Discipline    AS Meghana Carr RN 06/16/16 -  Nurse     Silvia Scott, ERNESTINE 08/19/18 -  PT          Wound 03/14/19 0946 Right leg incision (Active)   Dressing Appearance dry;intact;no drainage 3/15/2019  8:09 AM   Closure Approximated;Staples 3/15/2019  8:12 AM   Base clean;dry 3/15/2019  8:12 AM   Drainage Amount none 3/15/2019  8:09 AM   Dressing Care, Wound dressing applied;border dressing 3/15/2019  8:12 AM           Physical Therapy Education     Title: PT OT SLP Therapies (Resolved)     Topic: Physical Therapy (Resolved)     Point: Mobility training (Resolved)     Learning Progress Summary           Patient Acceptance, E,D, VU,DU by  at 3/15/2019 12:24 PM    Acceptance, E,D, VU,NR by MS at 3/14/2019  5:02 PM                   Point: Home exercise program (Resolved)     Learning Progress Summary           Patient Acceptance, E,D, VU,DU by  at 3/15/2019 12:24 PM    Acceptance, E,D, VU,NR by MS at 3/14/2019  5:02 PM                   Point: Body mechanics (Resolved)     Learning Progress Summary           Patient Acceptance, E,D, VU,DU by  at 3/15/2019 12:24 PM    Acceptance, MARGARITA MUNIZ VU, NR  by MS at 3/14/2019  5:02 PM                   Point: Precautions (Resolved)     Learning Progress Summary           Patient Acceptance, E,D, VU,DU by  at 3/15/2019 12:24 PM    Acceptance, E,D, VU,NR by MS at 3/14/2019  5:02 PM                               User Key     Initials Effective Dates Name Provider Type Discipline    MS 04/03/18 -  Daniel Franks MASTER, PT Physical Therapist PT     08/19/18 -  Silvia Scott PTA Physical Therapy Assistant PT                PT Recommendation and Plan  Anticipated Discharge Disposition (PT): home with assist, home with home health  Therapy Frequency (PT Clinical Impression): 2 times/day  Outcome Summary/Treatment Plan (PT)  Anticipated Discharge Disposition (PT): home with assist, home with home health  Plan of Care Reviewed With: patient  Progress: improving  Outcome Summary: Pt tolerated treatment with c/o discomfort of right knee. Pt able to perform TKA protocol exercises with minimal c/o difficulty. Pt ambulated 50 feet with rwx, CGA. Pt demo ascending/descending 4 steps with rwx and no HRs, CGA.   Outcome Measures     Row Name 03/15/19 1200 03/14/19 1700          How much help from another person do you currently need...    Turning from your back to your side while in flat bed without using bedrails?  4  -EH  4  -MS     Moving from lying on back to sitting on the side of a flat bed without bedrails?  4  -EH  4  -MS     Moving to and from a bed to a chair (including a wheelchair)?  3  -EH  3  -MS     Standing up from a chair using your arms (e.g., wheelchair, bedside chair)?  3  -EH  3  -MS     Climbing 3-5 steps with a railing?  3  -EH  2  -MS     To walk in hospital room?  3  -EH  3  -MS     AM-PAC 6 Clicks Score  20  -EH  19  -MS        Functional Assessment    Outcome Measure Options  --  AM-PAC 6 Clicks Basic Mobility (PT)  -MS       User Key  (r) = Recorded By, (t) = Taken By, (c) = Cosigned By    Initials Name Provider Type    Daniel Reeves MASTER, PT Physical  Therapist     Silvia Scott PTA Physical Therapy Assistant         Time Calculation:   PT Charges     Row Name 03/15/19 1505 03/15/19 1222          Time Calculation    Start Time  1304  -  0922  -     Stop Time  1343  -  1012  -     Time Calculation (min)  39 min  -EH  50 min  -     PT Received On  03/15/19  -  03/15/19  -     PT - Next Appointment  03/16/19  -  03/15/19  -        Time Calculation- PT    Total Timed Code Minutes- PT  39 minute(s)  -EH  38 minute(s)  -       User Key  (r) = Recorded By, (t) = Taken By, (c) = Cosigned By    Initials Name Provider Type     Silvia Scott PTA Physical Therapy Assistant        Therapy Suggested Charges     Code   Minutes Charges    None           Therapy Charges for Today     Code Description Service Date Service Provider Modifiers Qty    30103678423 HC PT THER PROC EA 15 MIN 3/15/2019 Silvia Scott, ERNESTINE GP 2    03174734960 HC PT THER PROC GROUP 3/15/2019 Silvia Scott, ERNESTINE GP 1    06580578741 HC PT THER SUPP EA 15 MIN 3/15/2019 Silvia Scott, PTA GP 1    18954953034 HC PT THER PROC EA 15 MIN 3/15/2019 Silvia Scott, ERNESTINE GP 2    06604511739 HC PT THER PROC GROUP 3/15/2019 Silvia Scott, ERNESTINE GP 1    34963807108 HC PT THER SUPP EA 15 MIN 3/15/2019 Silvia Scott, ERNESTINE GP 1          PT G-Codes  Outcome Measure Options: AM-PAC 6 Clicks Basic Mobility (PT)  AM-PAC 6 Clicks Score: 20    Silvia Scott PTA  3/15/2019

## 2019-03-15 NOTE — DISCHARGE SUMMARY
Discharge Summary    Patient Name:  Ashley Romero  YOB: 1955  Medical Records Number:  [id number]    Date of Admission:  3/14/2019  Date of Discharge:  3/15/2019    Primary Discharge Diagnosis:  OA (osteoarthritis) of knee [M17.10]    Secondary Discharge Diagnosis:    Problem List Items Addressed This Visit     None      Visit Diagnoses     Difficulty walking    -  Primary          Procedures Performed:  Right Total Knee Arthroplasty      Hospital Course:    Ashley Romero is a 63 y.o.  female who underwent successful right tka on 3/14/2019.  Ashley Romero was started on Aspirin 325mg po twice daily immediately post-operatively for DVT prophylaxis.  On post-op day 1 the patients dressing was changed and their incision was clean, with no signs of infection and their calf was soft, with no signs of DVT.  The patient progressed well with physical therapy and the patients hemoglobin remained stable. On post-operative day 1 the patient was felt ready for discharge.     Total Knee Joint Replacement Discharge Instructions:    I. ACTIVITIES:  1. Exercises:  ? Complete exercise program as taught by the hospital physical therapist 2 times per day  ? Exercise program will be advanced by the physical therapist  ? During the day be up ambulating every 2 hours (while awake) for short distances  ? Complete the ankle pump exercises at least 10 times per hour (while awake)  ? Elevate legs most of the day the first week post operatively and thereafter elevate legs when in bed and for at least 30 minutes during the day. Caution must be taken to avoid pillow placement under the bend of the knee as this can led to flexion contractures of the knee.  ? Use cold packs 20-30 minutes approximately 5 times per day. This should be done before and after completing your exercises and at any time you are experiencing pain/ stiffness in your operative extremity.      2. Activities of Daily Living:  ? No tub baths, hot tubs, or  swimming pools for 4 weeks  ? May shower and let water run over the incision on post-operative day #5 if no drainage. Do not scrub or rub the incision. Simply let the water run over the incision and pat dry.    II. Restrictions  ? Do not cross legs or kneel  ? Your surgeon will discuss with you when you will be able to drive again.  ? Weight bearing is as tolerated  ? First week stay inside on even terrain. May go up and down stairs one stair at a time utilizing the hand rail  ? After one week, you may venture outside.    III. Precautions:  ? Everyone that comes near you should wash their hands  ? No elective dental, genital-urinary, or colon procedures or surgical procedures for 12 weeks after surgery unless absolutely necessary.  ?  If dental work or surgical procedure is deemed absolutely necessary, you will need to contact your surgeon as you will need to take antibiotics 1 hour prior to any dental work (including teeth cleanings).  ? Please discuss with your surgeon prophylactic antibiotics as the length of time this intervention will be necessary for you varies with each patient’s health history and situation.  ? Avoid sick people. If you must be around someone who is ill, they should wear a mask.  ? Avoid visits to the Emergency Room or Urgent Care unless you are having a life threatening event.   ? If ordered stockings are to be placed on in the morning and removed at night. Monitor the stockings to ensure that any swelling is not causing the stockings to become too tight. In this case, remove stockings immediately.    IV. INCISION CARE:  ? Wash your hands prior to dressing changes  ? Change the dressing as needed to keep incision clean and dry. Utilize dry gauze and paper tape. Avoid touching the side of the gauze that goes against the incision with your hands.  ? No creams or ointments to the incision  ? May remove dressing once the incision is free of drainage  ? Do not touch or pick at the  incision  ? Check incision every day and notify surgeon immediately if any of the following signs or symptoms are noted:  o Increase in redness  o Increase in swelling around the incision and of the entire extremity  o Increase in pain  o Drainage oozing from the incision  o Pulling apart of the edges of the incision  o Increase in overall body temperature (greater than 100.5 degrees)  ? Your surgeon will instruct you regarding suture or staple removal    V. Medications:   1. Anticoagulants: You will be discharged on an anticoagulant. This is a prophylactic medication that helps prevent blood clots during your post-operative period. The type and length of dosage varies based on your individual needs, procedure performed, and surgeon’s preference.  ? While taking the anticoagulant, you should avoid taking any additional aspirin, ibuprofen (Advil or Motrin), Aleve (Naprosyn) or other non-steroidal anti-inflammatory medications.   ? Notify surgeon immediately if any zeynep bleeding is noted in the urine, stool, emesis, or from the nose or the incision. Blood in the stool will often appear as black rather than red. Blood in urine may appear as pink. Blood in emesis may appear as brown/black like coffee grounds.  ? You will need to apply pressure for longer periods of time to any cuts or abrasions to stop bleeding  ? Avoid alcohol while taking anticoagulants    2. Stool Softeners: You will be at greater risk of constipation after surgery due to being less mobile and the pain medications.   ? Take stool softeners as instructed by your surgeon while on pain medications. Over the counter Colace 100 mg 1-2 capsules twice daily.   ? If stools become too loose or too frequent, please decreases the dosage or stop the stool softener.  ? If constipation occurs despite use of stool softeners, you are to continue the stool softeners and add a laxative (Milk of Magnesia 1 ounce daily as needed)  ? Drink plenty of fluids, and eat  fruits and vegetables during your recovery time    3. Pain Medications utilized after surgery are narcotics and the law requires that the following information be given to all patients that are prescribed narcotics:  ? CLASSIFICATION: Pain medications are called Opioids and are narcotics  ? LEGALITIES: It is illegal to share narcotics with others and to drive within 24 hours of taking narcotics  ? POTENTIAL SIDE EFFECTS: Potential side effects of opioids include: nausea, vomiting, itching, dizziness, drowsiness, dry mouth, constipation, and difficulty urinating.  ? POTENTIAL ADVERSE EFFECTS:   o Opioid tolerance can develop with use of pain medications and this simply means that it requires more and more of the medication to control pain; however, this is seen more in patients that use opioids for longer periods of time.  o Opioid dependence can develop with use of Opioids and this simply means that to stop the medication can cause withdrawal symptoms; however, this is seen with patients that use Opioids for longer periods of time.  o Opioid addiction can develop with use of Opioids and the incidence of this is very unlikely in patients who take the medications as ordered and stop the medications as instructed.  o Opioid overdose can be dangerous, but is unlikely when the medication is taken as ordered and stopped when ordered. It is important not to mix opioids with alcohol or with and type of sedative such as Benadryl as this can lead to over sedation and respiratory difficulty.  ? DOSAGE:   o Pain medications will need to be taken consistently for the first week to decrease pain and promote adequate pain relief and participation in physical therapy.  o After the initial surgical pain begins to resolve, you may begin to decrease the pain medication. By the end of 6-8 weeks, you should be off of pain medications.  o Refills will not be given by the office during evening hours, on weekends, or after 6-8 weeks  post-op.  o To seek refills on pain medications during the initial 6 week post-operative period, you must call the office 48 hours in advance to request the refill. The office will then notify you when to  the prescription. DO NOT wait until you are out of the medication to request a refill.    V. FOLLOW-UP VISITS:  ? You will need to follow up in the office with your surgeon in 3 weeks. Please call this number [phone] to schedule this appointment.  If you have any concerns or suspected complications prior to your follow up visit, please call your surgeons office. Do not wait until your appointment time if you suspect complications. These will need to be addressed in the office promptly.      Discharge Medications:     1) Percocet 5/325 mg  1-2 po q 4-6 hours for pain control  2)  Aspirin 325 mg po twice daily for 2 weeks, then once daily for 4 weeks.    CC:Andreia Schaefer MD:Estevan Esquivel MD

## 2019-03-15 NOTE — THERAPY TREATMENT NOTE
Acute Care - Physical Therapy Treatment Note  [facility]     Patient Name: Ashley Romero  : 1955  MRN: [id number]  Today's Date: 3/15/2019  Onset of Illness/Injury or Date of Surgery: 19  Date of Referral to PT: 19  Referring Physician: Estevan Dennison    Admit Date: 3/14/2019    Visit Dx:    ICD-10-CM ICD-9-CM   1. Difficulty walking R26.2 719.7     Patient Active Problem List   Diagnosis   • OA (osteoarthritis) of knee       Therapy Treatment    Rehabilitation Treatment Summary     Row Name 03/15/19 1224             Treatment Time/Intention    Discipline  physical therapy assistant  -      Document Type  therapy note (daily note)  -      Subjective Information  complains of discomfort  -      Mode of Treatment  physical therapy;individual therapy  -      Care Plan Review  patient/other agree to care plan  -      Therapy Frequency (PT Clinical Impression)  2 times/day  -EH      Patient Effort  good  -EH      Existing Precautions/Restrictions  fall  -EH      Recorded by [] Silvia cSott PTA 03/15/19 1227      Row Name 03/15/19 1224             Cognitive Assessment/Intervention- PT/OT    Orientation Status (Cognition)  oriented x 3  -EH      Follows Commands (Cognition)  WNL  -EH      Personal Safety Interventions  fall prevention program maintained;gait belt;nonskid shoes/slippers when out of bed  -EH      Recorded by [] Silvia Scott PTA 03/15/19 1227      Row Name 03/15/19 1224             Mobility Assessment/Intervention    Extremity Weight-bearing Status  right lower extremity  -EH      Right Lower Extremity (Weight-bearing Status)  weight-bearing as tolerated (WBAT)  -EH      Recorded by [] Silvia Scott PTA 03/15/19 1227      Row Name 03/15/19 1224             Bed Mobility Assessment/Treatment    Bed Mobility Assessment/Treatment  --  -EH      Supine-Sit [place] (Bed Mobility)  --  -EH      Recorded by [] Silvia Scott PTA 03/15/19 1227      Row Name  03/15/19 1224             Transfer Assessment/Treatment    Transfer Assessment/Treatment  sit-stand transfer;stand-sit transfer  -EH      Recorded by [EH] Silvia Scott, PTA 03/15/19 1227      Row Name 03/15/19 1224             Sit-Stand Transfer    Sit-Stand [place] (Transfers)  contact guard  -EH      Assistive Device (Sit-Stand Transfers)  walker, front-wheeled  -EH      Recorded by [EH] Silvia Scott, ERNESTINE 03/15/19 1227      Row Name 03/15/19 1224             Stand-Sit Transfer    Stand-Sit [place] (Transfers)  contact guard  -EH      Assistive Device (Stand-Sit Transfers)  walker, front-wheeled  -EH      Recorded by [EH] Silvia Scott, ERNESTINE 03/15/19 1227      Row Name 03/15/19 1224             Gait/Stairs Assessment/Training    [place] Level (Gait)  contact guard  -EH      Assistive Device (Gait)  walker, front-wheeled  -EH      Distance in Feet (Gait)  50  -EH      Pattern (Gait)  step-through  -EH      Deviations/Abnormal Patterns (Gait)  antalgic  -EH      Bilateral Gait Deviations  knee buckling, right side  -EH      [place] Level (Stairs)  contact guard  -EH      Assistive Device (Stairs)  walker, front-wheeled  -EH      Handrail Location (Stairs)  none  -EH      Number of Steps (Stairs)  4  -EH      Ascending Technique (Stairs)  step-to-step  -EH      Descending Technique (Stairs)  step-to-step  -EH      Recorded by [EH] Silvia Scott, ERNESTINE 03/15/19 1227      Row Name 03/15/19 1224             Right Lower Ext    Rt Knee Extension/Flexion AROM  -13/93  -EH      Recorded by [EH] Silvia Scott, PTA 03/15/19 1227      Row Name 03/15/19 1224             Therapeutic Exercise    Comment (Therapeutic Exercise)  Right TKR protocol X15 reps  -EH      Recorded by [EH] Silvia Scott, PTA 03/15/19 1227      Row Name 03/15/19 1224             Positioning and Restraints    Pre-Treatment Position  sitting in chair/recliner  -EH      Post Treatment Position  chair  -EH      In Chair  reclined;call light  within reach;encouraged to call for assist  -      Recorded by [] Silvia Scott, ERNESTINE 03/15/19 1227      Row Name 03/15/19 1224             Pain Scale: Numbers Pre/Post-Treatment    Pain Scale: Numbers, Pretreatment  0/10 - no pain  -      Pain Scale: Numbers, Post-Treatment  --  -EH      Pain Location - Side  Right  -EH      Pain Location  knee  -      Pain Intervention(s)  Cold applied;Repositioned;Ambulation/increased activity  -      Recorded by [] Silvia Scott PTA 03/15/19 1227      Row Name                Wound 03/14/19 0946 Right leg incision    Wound - Properties Group Date first assessed: 03/14/19 [AS] Time first assessed: 0946 [AS] Side: Right [AS] Location: leg [AS] Type: incision [AS] Recorded by:  [AS] Meghana Carr RN 03/14/19 0946    Row Name 03/15/19 1224             Outcome Summary/Treatment Plan (PT)    Anticipated Discharge Disposition (PT)  home with assist;home with home health  -      Recorded by [] Silvia Scott PTA 03/15/19 1227        User Key  (r) = Recorded By, (t) = Taken By, (c) = Cosigned By    Initials Name Effective Dates Discipline    AS Meghana Carr RN 06/16/16 -  Nurse     Silvia Scott PTA 08/19/18 -  PT          Wound 03/14/19 0946 Right leg incision (Active)   Dressing Appearance dry;intact;no drainage 3/15/2019  8:09 AM   Closure Approximated;Staples 3/15/2019  8:12 AM   Base clean;dry 3/15/2019  8:12 AM   Drainage Amount none 3/15/2019  8:09 AM   Dressing Care, Wound dressing applied;border dressing 3/15/2019  8:12 AM           Physical Therapy Education     Title: PT OT SLP Therapies (Done)     Topic: Physical Therapy (Done)     Point: Mobility training (Done)     Learning Progress Summary           Patient Acceptance, E,D, VU,DU by  at 3/15/2019 12:24 PM    Acceptance, E,D, VU,NR by MS at 3/14/2019  5:02 PM                   Point: Home exercise program (Done)     Learning Progress Summary           Patient Acceptance, E,D, VU,DU by  at  3/15/2019 12:24 PM    Acceptance, E,D, VU,NR by MS at 3/14/2019  5:02 PM                   Point: Body mechanics (Done)     Learning Progress Summary           Patient Acceptance, E,D, VU,DU by  at 3/15/2019 12:24 PM    Acceptance, E,D, VU,NR by MS at 3/14/2019  5:02 PM                   Point: Precautions (Done)     Learning Progress Summary           Patient Acceptance, E,D, VU,DU by  at 3/15/2019 12:24 PM    Acceptance, E,D, VU,NR by MS at 3/14/2019  5:02 PM                               User Key     Initials Effective Dates Name Provider Type Discipline    MS 04/03/18 -  Daniel Franks, PT Physical Therapist PT     08/19/18 -  Silvia Scott PTA Physical Therapy Assistant PT                PT Recommendation and Plan  Anticipated Discharge Disposition (PT): home with assist, home with home health  Therapy Frequency (PT Clinical Impression): 2 times/day  Outcome Summary/Treatment Plan (PT)  Anticipated Discharge Disposition (PT): home with assist, home with home health  Outcome Measures     Row Name 03/15/19 1200 03/14/19 1700          How much help from another person do you currently need...    Turning from your back to your side while in flat bed without using bedrails?  4  -EH  4  -MS     Moving from lying on back to sitting on the side of a flat bed without bedrails?  4  -EH  4  -MS     Moving to and from a bed to a chair (including a wheelchair)?  3  -EH  3  -MS     Standing up from a chair using your arms (e.g., wheelchair, bedside chair)?  3  -EH  3  -MS     Climbing 3-5 steps with a railing?  3  -EH  2  -MS     To walk in hospital room?  3  -EH  3  -MS     AM-PAC 6 Clicks Score  20  -EH  19  -MS        Functional Assessment    Outcome Measure Options  --  AM-PAC 6 Clicks Basic Mobility (PT)  -MS       User Key  (r) = Recorded By, (t) = Taken By, (c) = Cosigned By    Initials Name Provider Type    MS FranksDaniel, PT Physical Therapist     Silvia Scott PTA Physical Therapy Assistant          Time Calculation:   PT Charges     Row Name 03/15/19 1222             Time Calculation    Start Time  0922  -      Stop Time  1012  -      Time Calculation (min)  50 min  -      PT Received On  03/15/19  -      PT - Next Appointment  03/15/19  -         Time Calculation- PT    Total Timed Code Minutes- PT  38 minute(s)  -        User Key  (r) = Recorded By, (t) = Taken By, (c) = Cosigned By    Initials Name Provider Type     Silvia Scott PTA Physical Therapy Assistant        Therapy Suggested Charges     Code   Minutes Charges    None           Therapy Charges for Today     Code Description Service Date Service Provider Modifiers Qty    40402499866 HC PT THER PROC EA 15 MIN 3/15/2019 Silvia Scott PTA GP 2    09719303805 HC PT THER PROC GROUP 3/15/2019 Silvia Scott PTA GP 1    29095888755 HC PT THER SUPP EA 15 MIN 3/15/2019 Silvia Scott PTA GP 1          PT G-Codes  Outcome Measure Options: AM-PAC 6 Clicks Basic Mobility (PT)  AM-PAC 6 Clicks Score: 20    Silvia Scott PTA  3/15/2019

## 2019-04-12 ENCOUNTER — TELEPHONE (OUTPATIENT)
Dept: INTERNAL MEDICINE | Facility: CLINIC | Age: 64
End: 2019-04-12

## 2019-04-15 RX ORDER — AMLODIPINE BESYLATE 5 MG/1
5 TABLET ORAL DAILY
Qty: 90 TABLET | Refills: 1 | Status: SHIPPED | OUTPATIENT
Start: 2019-04-15 | End: 2020-03-17

## 2019-04-15 RX ORDER — AMLODIPINE BESYLATE 5 MG/1
5 TABLET ORAL DAILY
Qty: 90 TABLET | Refills: 1 | Status: SHIPPED | OUTPATIENT
Start: 2019-04-15 | End: 2019-04-15 | Stop reason: SDUPTHER

## 2019-05-07 ENCOUNTER — TELEPHONE (OUTPATIENT)
Dept: INTERNAL MEDICINE | Facility: CLINIC | Age: 64
End: 2019-05-07

## 2019-05-07 RX ORDER — LOSARTAN POTASSIUM 100 MG/1
100 TABLET ORAL DAILY
Qty: 90 TABLET | Refills: 1 | Status: SHIPPED | OUTPATIENT
Start: 2019-05-07 | End: 2019-11-05 | Stop reason: SDUPTHER

## 2019-05-07 NOTE — TELEPHONE ENCOUNTER
Patient has a lot of discomfort in the pelvic area, she just had surgery and was on an antibiotic, would like to come in to be seen, she doesn't believe that it's urgent but would like to get in soon.

## 2019-05-09 ENCOUNTER — OFFICE VISIT (OUTPATIENT)
Dept: INTERNAL MEDICINE | Facility: CLINIC | Age: 64
End: 2019-05-09

## 2019-05-09 VITALS
WEIGHT: 211 LBS | HEIGHT: 61 IN | BODY MASS INDEX: 39.84 KG/M2 | DIASTOLIC BLOOD PRESSURE: 70 MMHG | SYSTOLIC BLOOD PRESSURE: 134 MMHG

## 2019-05-09 DIAGNOSIS — I10 BENIGN ESSENTIAL HYPERTENSION: ICD-10-CM

## 2019-05-09 DIAGNOSIS — M89.8X8 BONY PELVIC PAIN: Primary | ICD-10-CM

## 2019-05-09 DIAGNOSIS — E78.2 MIXED HYPERLIPIDEMIA: ICD-10-CM

## 2019-05-09 DIAGNOSIS — R73.9 HYPERGLYCEMIA: ICD-10-CM

## 2019-05-09 PROBLEM — R55 VASOVAGAL SYNCOPE: Status: ACTIVE | Noted: 2019-05-09

## 2019-05-09 PROCEDURE — 99213 OFFICE O/P EST LOW 20 MIN: CPT | Performed by: INTERNAL MEDICINE

## 2019-05-09 NOTE — PROGRESS NOTES
Assessment and Plan  Problems Addressed this Visit        Cardiovascular and Mediastinum    Benign essential hypertension    Hyperlipidemia    Relevant Orders    Comprehensive Metabolic Panel    Lipid Panel With LDL / HDL Ratio    TSH      Other Visit Diagnoses     Bony pelvic pain    -  Primary    This is a new problem and I suspect this is related to the TKA, PT.  Will watch for now and ressess if changes or worsens.    Hyperglycemia        Relevant Orders    Hemoglobin A1c        F/U and Patient Instructions    Return if symptoms worsen or fail to improve.  There are no Patient Instructions on file for this visit.    Crystal Romero is a 63 y.o. female being seen in our office today for Abdominal Pain (lower left side )     History of the Present Illness  Abdominal Pain   This is a new problem. The current episode started 1 to 4 weeks ago. The onset quality is undetermined. The problem occurs daily. The problem has been waxing and waning. The pain is located in the right flank. The pain is at a severity of 3/10. The quality of the pain is aching and dull. The abdominal pain radiates to the pelvis. Associated symptoms include frequency and headaches. Pertinent negatives include no anorexia, arthralgias, belching, constipation, diarrhea, dysuria, fever, flatus, hematochezia, hematuria, melena, myalgias, nausea, vomiting or weight loss (intentional with diet). The pain is relieved by nothing.   She can't tell what makes worse or better. She is doing PT still for R total knee.    No vaginal, GI,  symptoms.    Patient History        Significant Past History  The following portions of the patient's history were reviewed and updated as appropriate:PMHroutine: Social history , Allergies, Current Medications, Active Problem List and Health Maintenance              Social History  She  reports that she has never smoked. She has never used smokeless tobacco. She reports that she drinks alcohol. She reports  that she does not use drugs.                         Review of Symptoms  Review of Systems   Constitution: Negative for fever and weight loss (intentional with diet).   Skin: Negative.    Musculoskeletal: Negative for arthralgias and myalgias.   Gastrointestinal: Positive for abdominal pain. Negative for anorexia, constipation, diarrhea, flatus, hematochezia, melena, nausea and vomiting.   Genitourinary: Positive for frequency. Negative for dysuria and hematuria.   Neurological: Positive for headaches.     Objective  Vital Signs         BP Readings from Last 1 Encounters:   05/09/19 134/70     Wt Readings from Last 3 Encounters:   05/09/19 95.7 kg (211 lb)   03/14/19 102 kg (225 lb 8 oz)   03/07/19 102 kg (224 lb)   Body mass index is 39.87 kg/m².          Physical Exam   Physical Exam   Constitutional: No distress.   Cardiovascular: Normal rate.   Pulmonary/Chest: Effort normal.   Musculoskeletal: Normal range of motion. Tenderness: tender over R mons, no pain with manipulaiton of hip.     Neurological: Coordination normal.     Data Reviewed    Recent Results (from the past 2016 hour(s))   Protime-INR    Collection Time: 03/07/19  6:46 AM   Result Value Ref Range    Protime 13.9 11.7 - 14.2 Seconds    INR 1.09 0.90 - 1.10   CBC (No Diff)    Collection Time: 03/07/19  6:46 AM   Result Value Ref Range    WBC 7.29 3.40 - 10.80 10*3/mm3    RBC 4.78 3.77 - 5.28 10*6/mm3    Hemoglobin 13.4 12.0 - 15.9 g/dL    Hematocrit 42.8 34.0 - 46.6 %    MCV 89.5 79.0 - 97.0 fL    MCH 28.0 26.6 - 33.0 pg    MCHC 31.3 (L) 31.5 - 35.7 g/dL    RDW 13.6 12.3 - 15.4 %    RDW-SD 45.0 37.0 - 54.0 fl    MPV 10.0 6.0 - 12.0 fL    Platelets 295 140 - 450 10*3/mm3   Comprehensive Metabolic Panel    Collection Time: 03/07/19  6:46 AM   Result Value Ref Range    Glucose 99 65 - 99 mg/dL    BUN 28 (H) 8 - 23 mg/dL    Creatinine 0.85 0.57 - 1.00 mg/dL    Sodium 141 136 - 145 mmol/L    Potassium 3.7 3.5 - 5.2 mmol/L    Chloride 101 98 - 107 mmol/L     CO2 26.2 22.0 - 29.0 mmol/L    Calcium 10.4 8.6 - 10.5 mg/dL    Total Protein 8.1 6.0 - 8.5 g/dL    Albumin 4.80 3.50 - 5.20 g/dL    ALT (SGPT) 26 1 - 33 U/L    AST (SGOT) 20 1 - 32 U/L    Alkaline Phosphatase 74 39 - 117 U/L    Total Bilirubin 0.5 0.1 - 1.2 mg/dL    eGFR Non African Amer 68 >60 mL/min/1.73    Globulin 3.3 gm/dL    A/G Ratio 1.5 g/dL    BUN/Creatinine Ratio 32.9 (H) 7.0 - 25.0    Anion Gap 13.8 mmol/L   Urinalysis without microscopic (no culture) - Urine, Clean Catch    Collection Time: 03/07/19  6:47 AM   Result Value Ref Range    Color, UA Yellow Yellow, Straw    Appearance, UA Clear Clear    pH, UA <=5.0 5.0 - 8.0    Specific Gravity, UA 1.026 1.005 - 1.030    Glucose, UA Negative Negative    Ketones, UA Negative Negative    Bilirubin, UA Negative Negative    Blood, UA Negative Negative    Protein, UA Negative Negative    Leuk Esterase, UA Small (1+) (A) Negative    Nitrite, UA Negative Negative    Urobilinogen, UA 0.2 E.U./dL 0.2 - 1.0 E.U./dL   Urinalysis, Microscopic Only - Urine, Clean Catch    Collection Time: 03/07/19  6:47 AM   Result Value Ref Range    RBC, UA 0-2 None Seen, 0-2 /HPF    WBC, UA 0-2 None Seen, 0-2 /HPF    Bacteria, UA None Seen None Seen /HPF    Squamous Epithelial Cells, UA 0-2 None Seen, 0-2 /HPF    Hyaline Casts, UA 3-6 None Seen /LPF    Methodology Automated Microscopy    Hepatitis C Antibody    Collection Time: 03/14/19  5:46 PM   Result Value Ref Range    Hepatitis C Ab Non-Reactive Non-Reactive   ALT    Collection Time: 03/14/19  5:46 PM   Result Value Ref Range    ALT (SGPT) 27 1 - 33 U/L   Hepatitis B Surface Antigen    Collection Time: 03/14/19  5:46 PM   Result Value Ref Range    Hepatitis B Surface Ag Non-Reactive Non-Reactive   HIV-1 / O / 2 Ag / Antibody 4th Generation    Collection Time: 03/14/19  5:46 PM   Result Value Ref Range    HIV-1/ HIV-2 Non-Reactive Non-Reactive   Hepatitis B Surface Antibody    Collection Time: 03/14/19  5:46 PM   Result Value  Ref Range    Hep B S Ab Non-Reactive Non-Reactive   Basic Metabolic Panel    Collection Time: 03/15/19  4:22 AM   Result Value Ref Range    Glucose 115 (H) 65 - 99 mg/dL    BUN 16 8 - 23 mg/dL    Creatinine 0.61 0.57 - 1.00 mg/dL    Sodium 138 136 - 145 mmol/L    Potassium 3.4 (L) 3.5 - 5.2 mmol/L    Chloride 100 98 - 107 mmol/L    CO2 23.9 22.0 - 29.0 mmol/L    Calcium 8.8 8.6 - 10.5 mg/dL    eGFR Non African Amer 99 >60 mL/min/1.73    BUN/Creatinine Ratio 26.2 (H) 7.0 - 25.0    Anion Gap 14.1 mmol/L   CBC (No Diff)    Collection Time: 03/15/19  4:22 AM   Result Value Ref Range    WBC 9.38 3.40 - 10.80 10*3/mm3    RBC 3.86 3.77 - 5.28 10*6/mm3    Hemoglobin 10.8 (L) 12.0 - 15.9 g/dL    Hematocrit 33.8 (L) 34.0 - 46.6 %    MCV 87.6 79.0 - 97.0 fL    MCH 28.0 26.6 - 33.0 pg    MCHC 32.0 31.5 - 35.7 g/dL    RDW 13.5 12.3 - 15.4 %    RDW-SD 42.5 37.0 - 54.0 fl    MPV 10.2 6.0 - 12.0 fL    Platelets 245 140 - 450 10*3/mm3

## 2019-05-14 RX ORDER — PAROXETINE HYDROCHLORIDE 20 MG/1
20 TABLET, FILM COATED ORAL DAILY
Qty: 90 TABLET | Refills: 3 | Status: SHIPPED | OUTPATIENT
Start: 2019-05-14 | End: 2020-05-01

## 2019-08-02 ENCOUNTER — APPOINTMENT (OUTPATIENT)
Dept: PREADMISSION TESTING | Facility: HOSPITAL | Age: 64
End: 2019-08-02

## 2019-08-02 ENCOUNTER — HOSPITAL ENCOUNTER (OUTPATIENT)
Dept: GENERAL RADIOLOGY | Facility: HOSPITAL | Age: 64
Discharge: HOME | End: 2019-08-02

## 2019-08-02 ENCOUNTER — HOSPITAL ENCOUNTER (OUTPATIENT)
Dept: GENERAL RADIOLOGY | Facility: HOSPITAL | Age: 64
Discharge: HOME | End: 2019-08-02
Admitting: ORTHOPAEDIC SURGERY

## 2019-08-02 VITALS
OXYGEN SATURATION: 98 % | HEIGHT: 61 IN | SYSTOLIC BLOOD PRESSURE: 132 MMHG | DIASTOLIC BLOOD PRESSURE: 78 MMHG | TEMPERATURE: 98.8 F | HEART RATE: 57 BPM | BODY MASS INDEX: 40.42 KG/M2 | WEIGHT: 214.1 LBS | RESPIRATION RATE: 16 BRPM

## 2019-08-02 LAB
ALBUMIN SERPL-MCNC: 4.6 G/DL (ref 3.5–5.2)
ALBUMIN/GLOB SERPL: 1.4 G/DL
ALP SERPL-CCNC: 70 U/L (ref 39–117)
ALT SERPL W P-5'-P-CCNC: 22 U/L (ref 1–33)
ANION GAP SERPL CALCULATED.3IONS-SCNC: 11.6 MMOL/L (ref 5–15)
AST SERPL-CCNC: 19 U/L (ref 1–32)
BACTERIA UR QL AUTO: NORMAL /HPF
BILIRUB SERPL-MCNC: 0.5 MG/DL (ref 0.2–1.2)
BILIRUB UR QL STRIP: NEGATIVE
BUN BLD-MCNC: 13 MG/DL (ref 8–23)
BUN/CREAT SERPL: 16.5 (ref 7–25)
CALCIUM SPEC-SCNC: 9.6 MG/DL (ref 8.6–10.5)
CHLORIDE SERPL-SCNC: 100 MMOL/L (ref 98–107)
CLARITY UR: CLEAR
CO2 SERPL-SCNC: 28.4 MMOL/L (ref 22–29)
COLOR UR: YELLOW
CREAT BLD-MCNC: 0.79 MG/DL (ref 0.57–1)
DEPRECATED RDW RBC AUTO: 44.2 FL (ref 37–54)
ERYTHROCYTE [DISTWIDTH] IN BLOOD BY AUTOMATED COUNT: 13.8 % (ref 12.3–15.4)
GFR SERPL CREATININE-BSD FRML MDRD: 74 ML/MIN/1.73
GLOBULIN UR ELPH-MCNC: 3.4 GM/DL
GLUCOSE BLD-MCNC: 96 MG/DL (ref 65–99)
GLUCOSE UR STRIP-MCNC: NEGATIVE MG/DL
HCT VFR BLD AUTO: 40.6 % (ref 34–46.6)
HGB BLD-MCNC: 12.8 G/DL (ref 12–15.9)
HGB UR QL STRIP.AUTO: NEGATIVE
HYALINE CASTS UR QL AUTO: NORMAL /LPF
INR PPP: 1.03 (ref 0.9–1.1)
KETONES UR QL STRIP: NEGATIVE
LEUKOCYTE ESTERASE UR QL STRIP.AUTO: ABNORMAL
MCH RBC QN AUTO: 27.8 PG (ref 26.6–33)
MCHC RBC AUTO-ENTMCNC: 31.5 G/DL (ref 31.5–35.7)
MCV RBC AUTO: 88.1 FL (ref 79–97)
NITRITE UR QL STRIP: NEGATIVE
PH UR STRIP.AUTO: 7 [PH] (ref 5–8)
PLATELET # BLD AUTO: 276 10*3/MM3 (ref 140–450)
PMV BLD AUTO: 9.9 FL (ref 6–12)
POTASSIUM BLD-SCNC: 3.8 MMOL/L (ref 3.5–5.2)
PROT SERPL-MCNC: 8 G/DL (ref 6–8.5)
PROT UR QL STRIP: NEGATIVE
PROTHROMBIN TIME: 13.2 SECONDS (ref 11.7–14.2)
RBC # BLD AUTO: 4.61 10*6/MM3 (ref 3.77–5.28)
RBC # UR: NORMAL /HPF
REF LAB TEST METHOD: NORMAL
SODIUM BLD-SCNC: 140 MMOL/L (ref 136–145)
SP GR UR STRIP: 1.02 (ref 1–1.03)
SQUAMOUS #/AREA URNS HPF: NORMAL /HPF
UROBILINOGEN UR QL STRIP: ABNORMAL
WBC NRBC COR # BLD: 5.58 10*3/MM3 (ref 3.4–10.8)
WBC UR QL AUTO: NORMAL /HPF

## 2019-08-02 PROCEDURE — 81001 URINALYSIS AUTO W/SCOPE: CPT | Performed by: ORTHOPAEDIC SURGERY

## 2019-08-02 PROCEDURE — 85610 PROTHROMBIN TIME: CPT | Performed by: ORTHOPAEDIC SURGERY

## 2019-08-02 PROCEDURE — 80053 COMPREHEN METABOLIC PANEL: CPT | Performed by: ORTHOPAEDIC SURGERY

## 2019-08-02 PROCEDURE — 85027 COMPLETE CBC AUTOMATED: CPT | Performed by: ORTHOPAEDIC SURGERY

## 2019-08-02 PROCEDURE — 71046 X-RAY EXAM CHEST 2 VIEWS: CPT

## 2019-08-02 PROCEDURE — 36415 COLL VENOUS BLD VENIPUNCTURE: CPT

## 2019-08-02 PROCEDURE — 73560 X-RAY EXAM OF KNEE 1 OR 2: CPT

## 2019-08-02 RX ORDER — CHLORHEXIDINE GLUCONATE 500 MG/1
1 CLOTH TOPICAL 2 TIMES DAILY
COMMUNITY
Start: 2019-08-14 | End: 2019-08-17 | Stop reason: HOSPADM

## 2019-08-02 RX ORDER — ASPIRIN 81 MG/1
81 TABLET ORAL DAILY
COMMUNITY
End: 2019-08-17 | Stop reason: HOSPADM

## 2019-08-02 RX ORDER — MELOXICAM 15 MG/1
15 TABLET ORAL DAILY
COMMUNITY
End: 2019-08-17 | Stop reason: HOSPADM

## 2019-08-02 RX ORDER — METOPROLOL SUCCINATE 25 MG/1
25 TABLET, EXTENDED RELEASE ORAL DAILY
COMMUNITY

## 2019-08-02 ASSESSMENT — KOOS JR
KOOS JR SCORE: 16
KOOS JR SCORE: 47.487

## 2019-08-02 NOTE — PROGRESS NOTES
Pre Op Ortho Assessment    [facility]     Patient Name: Ashley Romero  MRN: [id number]  Today's Date: 8/2/2019        PRE-OPERATIVE ORTHOPEDIC ASSESSMENT     Row Name 08/02/19 1334       Question    What is your age group?  2    Gender?  1    How far on average can you walk? (a block is 200 meters)  1    Which gait aid do you use? (more often than not)  2    Do you use community supports: (home help, meals on wheels, district nursing)  1    Will you live with someone who can care for you after your operation?  3    Your Score (out of 12)  10       Discharge Disposition/Planning:    Discussed the predicted discharge disposition needed based on RAPT Assessment with the patient  Yes    Patient Selected Discharge Disposition:  Home 2 STEPS INTO THE HOUSE W/O RAIL AND NONE INSIDE.    Outpatient Rehabilitation Facility of Choice:  other *see comment [facility].    Home Health Services Preferred:  Other *see comment [facility]    Post-operative Caregiver Name and Phone Number  Kashif SCHILLING - [phone]       Home Equipment    Does patient have a walker for home use?  Yes    Does patient have a 3 in 1 commode for home use?  No    Does patient have a shower chair for home use?  No    Does patient have an elevated commode seat for home use?  No    Does patient have a cane for home use?  Yes    Is there any other medical equipment in the home?  No       Pre-Operative Class Attendance    Attended or scheduled to attend the pre-operative class within 1 year of total joint replacement?  Yes IN MARCH 2019 WHEN SHE HAD HER RIGHT KNEE DONE.        Patient Education    Expected time of discharge discussed  Yes    Encouraged to attend pre-operative class  Yes    Education regarding predicted discharge disposition based on RAPT score  Yes    Patient receptive and voiced understanding of information given  Yes            Ga Best LPN

## 2019-08-02 NOTE — DISCHARGE INSTRUCTIONS
Take the following medications the morning of surgery with a small sip of water:    SURGERY 08/15, ARRIVAL TIME05:30, TAKE LOSARTAN, AMLODIPINE, METOPROLOL, OMEPRAZOLE MORNING OF SURGERY    General Instructions:  • Do not eat solid food after midnight the night before surgery.  • You may drink clear liquids day of surgery but must stop at least one hour before your hospital arrival time.  • It is beneficial for you to have a clear drink that contains carbohydrates the day of surgery.  We suggest a 12 to 20 ounce bottle of Gatorade or Powerade for non-diabetic patients or a 12 to 20 ounce bottle of G2 or Powerade Zero for diabetic patients. (Pediatric patients, are not advised to drink a 12 to 20 ounce carbohydrate drink)    Clear liquids are liquids you can see through.  Nothing red in color.     Plain water                               Sports drinks  Sodas                                   Gelatin (Jell-O)  Fruit juices without pulp such as white grape juice and apple juice  Popsicles that contain no fruit or yogurt  Tea or coffee (no cream or milk added)  Gatorade / Powerade  G2 / Powerade Zero    • Infants may have breast milk up to four hours before surgery.  • Infants drinking formula may drink formula up to six hours before surgery.   • Patients who avoid smoking, chewing tobacco and alcohol for 4 weeks prior to surgery have a reduced risk of post-operative complications.  Quit smoking as many days before surgery as you can.  • Do not smoke, use chewing tobacco or drink alcohol the day of surgery.   • If applicable bring your C-PAP/ BI-PAP machine.  • Bring any papers given to you in the doctor’s office.  • Wear clean comfortable clothes and socks.  • Do not wear contact lenses, false eyelashes or make-up.  Bring a case for your glasses.   • Bring crutches or walker if applicable.  • Remove all piercings.  Leave jewelry and any other valuables at home.  • Hair extensions with metal clips must be removed prior  to surgery.  • The Pre-Admission Testing nurse will instruct you to bring medications if unable to obtain an accurate list in Pre-Admission Testing.        If you were given a blood bank ID arm band remember to bring it with you the day of surgery.    Preventing a Surgical Site Infection:  • For 2 to 3 days before surgery, avoid shaving with a razor because the razor can irritate skin and make it easier to develop an infection.    • Any areas of open skin can increase the risk of a post-operative wound infection by allowing bacteria to enter and travel throughout the body.  Notify your surgeon if you have any skin wounds / rashes even if it is not near the expected surgical site.  The area will need assessed to determine if surgery should be delayed until it is healed.  • The night prior to surgery sleep in a clean bed with clean clothing.  Do not allow pets to sleep with you.  • Shower on the morning of surgery using a fresh bar of anti-bacterial soap (such as Dial) and clean washcloth.  Dry with a clean towel and dress in clean clothing.  • Ask your surgeon if you will be receiving antibiotics prior to surgery.  • Make sure you, your family, and all healthcare providers clean their hands with soap and water or an alcohol based hand  before caring for you or your wound.    Day of surgery:  Upon arrival, a Pre-op nurse and Anesthesiologist will review your health history, obtain vital signs, and answer questions you may have.  The only belongings needed at this time will be a list of your home medications and if applicable your C-PAP/BI-PAP machine.  If you are staying overnight your family can leave the rest of your belongings in the car and bring them to your room later.  A Pre-op nurse will start an IV and you may receive medication in preparation for surgery, including something to help you relax.  Your family will be able to see you in the Pre-op area.  While you are in surgery your family should  notify the waiting room  if they leave the waiting room area and provide a contact phone number.    Please be aware that surgery does come with discomfort.  We want to make every effort to control your discomfort so please discuss any uncontrolled symptoms with your nurse.   Your doctor will most likely have prescribed pain medications.      If you are going home after surgery you will receive individualized written care instructions before being discharged.  A responsible adult must drive you to and from the hospital on the day of your surgery and stay with you for 24 hours.    If you are staying overnight following surgery, you will be transported to your hospital room following the recovery period.  [facility] has all private rooms.    You have received a list of surgical assistants for your reference.  If you have any questions please call Pre-Admission Testing at [phone].  Deductibles and co-payments are collected on the day of service. Please be prepared to pay the required co-pay, deductible or deposit on the day of service as defined by your plan.    2% CHLORAHEXIDINE GLUCONATE* CLOTH  Preparing or “prepping” skin before surgery can reduce the risk of infection at the surgical site. To make the process easier, [facility] has chosen disposable cloths moistened with a rinse-free, 2% Chlorhexidine Gluconate (CHG) antiseptic solution. The steps below outline the prepping process and should be carefully followed.        Use the prep cloth on the area that is circled in the diagram             Directions Night before Surgery  1) Shower using a fresh bar of anti-bacterial soap (such as Dial) and clean washcloth.  Use a clean towel to completely dry your skin.  2) Do not use any lotions, oils or creams on your skin.  3) Open the package and remove 1 cloth, wipe your skin for 30 seconds in a circular motion.  Allow to dry for 3 minutes.  4) Repeat #3 with second  cloth.  5) Do not touch your eyes, ears, or mouth with the prep cloth.  6) Allow the wet prep solution to air dry.  7) Discard the prep cloth and wash your hands with soap and water.   8) Dress in clean bed clothes and sleep on fresh clean bed sheets.   9) You may experience some temporary itching after the prep.    Directions Day of Surgery  1) Repeat steps 1,2,3,4,5,6,7, and 9.   2) Dress in clean clothes before coming to the hospital.    BACTROBAN NASAL OINTMENT  There are many germs normally in your nose. Bactroban is an ointment that will help reduce these germs. Please follow these instructions for Bactroban use:      ____The day before surgery in the morning  Date________    ____The day before surgery in the evening              Date________    ____The day of surgery in the morning    Date________    **Squirt ½ package of Bactroban Ointment onto a cotton applicator and apply to inside of 1st nostril.  Squirt the remaining Bactroban and apply to the inside of the other nostril.

## 2019-08-15 ENCOUNTER — ANESTHESIA EVENT (OUTPATIENT)
Dept: PERIOP | Facility: HOSPITAL | Age: 64
End: 2019-08-15

## 2019-08-15 ENCOUNTER — APPOINTMENT (OUTPATIENT)
Dept: GENERAL RADIOLOGY | Facility: HOSPITAL | Age: 64
End: 2019-08-15

## 2019-08-15 ENCOUNTER — HOSPITAL ENCOUNTER (INPATIENT)
Facility: HOSPITAL | Age: 64
LOS: 2 days | Discharge: HOME | End: 2019-08-17
Attending: ORTHOPAEDIC SURGERY | Admitting: ORTHOPAEDIC SURGERY

## 2019-08-15 ENCOUNTER — ANESTHESIA (OUTPATIENT)
Dept: PERIOP | Facility: HOSPITAL | Age: 64
End: 2019-08-15

## 2019-08-15 PROCEDURE — 25010000002 ROPIVACAINE PER 1 MG: Performed by: ANESTHESIOLOGY

## 2019-08-15 PROCEDURE — 25010000002 KETOROLAC TROMETHAMINE PER 15 MG: Performed by: ORTHOPAEDIC SURGERY

## 2019-08-15 PROCEDURE — C1713 ANCHOR/SCREW BN/BN,TIS/BN: HCPCS | Performed by: ORTHOPAEDIC SURGERY

## 2019-08-15 PROCEDURE — C1776 JOINT DEVICE (IMPLANTABLE): HCPCS | Performed by: ORTHOPAEDIC SURGERY

## 2019-08-15 PROCEDURE — 25010000002 HYDRALAZINE PER 20 MG: Performed by: ANESTHESIOLOGY

## 2019-08-15 PROCEDURE — 25010000002 FENTANYL CITRATE (PF) 100 MCG/2ML SOLUTION

## 2019-08-15 PROCEDURE — 73560 X-RAY EXAM OF KNEE 1 OR 2: CPT

## 2019-08-15 PROCEDURE — 94799 UNLISTED PULMONARY SVC/PX: CPT

## 2019-08-15 PROCEDURE — 97110 THERAPEUTIC EXERCISES: CPT

## 2019-08-15 PROCEDURE — 25010000002 HYDROMORPHONE PER 4 MG: Performed by: ANESTHESIOLOGY

## 2019-08-15 PROCEDURE — 25010000002 HYDROMORPHONE 1 MG/ML SOLUTION

## 2019-08-15 PROCEDURE — 25010000002 MORPHINE (PF) 10 MG/ML SOLUTION 1 ML VIAL: Performed by: ORTHOPAEDIC SURGERY

## 2019-08-15 PROCEDURE — 97161 PT EVAL LOW COMPLEX 20 MIN: CPT

## 2019-08-15 PROCEDURE — 25010000002 ROPIVACAINE PER 1 MG: Performed by: ORTHOPAEDIC SURGERY

## 2019-08-15 PROCEDURE — 25010000002 PROPOFOL 10 MG/ML EMULSION: Performed by: ANESTHESIOLOGY

## 2019-08-15 PROCEDURE — 25010000002 NEOSTIGMINE PER 0.5 MG: Performed by: ANESTHESIOLOGY

## 2019-08-15 PROCEDURE — 0SRD0J9 REPLACEMENT OF LEFT KNEE JOINT WITH SYNTHETIC SUBSTITUTE, CEMENTED, OPEN APPROACH: ICD-10-PCS | Performed by: ORTHOPAEDIC SURGERY

## 2019-08-15 PROCEDURE — 25010000002 FENTANYL CITRATE (PF) 100 MCG/2ML SOLUTION: Performed by: ANESTHESIOLOGY

## 2019-08-15 PROCEDURE — 25010000002 MIDAZOLAM PER 1 MG: Performed by: ANESTHESIOLOGY

## 2019-08-15 PROCEDURE — 25010000002 ONDANSETRON PER 1 MG: Performed by: ANESTHESIOLOGY

## 2019-08-15 DEVICE — TRY TIB INTERLOK PRI 71MM: Type: IMPLANTABLE DEVICE | Site: KNEE | Status: FUNCTIONAL

## 2019-08-15 DEVICE — CMT BONE R 1X40: Type: IMPLANTABLE DEVICE | Site: KNEE | Status: FUNCTIONAL

## 2019-08-15 DEVICE — PAT 3PEG THN 34X7.8 34MM: Type: IMPLANTABLE DEVICE | Site: KNEE | Status: FUNCTIONAL

## 2019-08-15 DEVICE — BEAR TIB/KN VANGUARD CR LIP 12X71/75MM NS: Type: IMPLANTABLE DEVICE | Site: KNEE | Status: FUNCTIONAL

## 2019-08-15 DEVICE — COMP FEM/KN VANGUARD INTLK CR 62.5MM NS LT: Type: IMPLANTABLE DEVICE | Site: KNEE | Status: FUNCTIONAL

## 2019-08-15 DEVICE — CAP TOTL KN CMT PREMIUM: Type: IMPLANTABLE DEVICE | Site: KNEE | Status: FUNCTIONAL

## 2019-08-15 DEVICE — STEM TIB PRI FINN 46X40MM: Type: IMPLANTABLE DEVICE | Site: KNEE | Status: FUNCTIONAL

## 2019-08-15 RX ORDER — ONDANSETRON 2 MG/ML
4 INJECTION INTRAMUSCULAR; INTRAVENOUS ONCE AS NEEDED
Status: DISCONTINUED | OUTPATIENT
Start: 2019-08-15 | End: 2019-08-15 | Stop reason: HOSPADM

## 2019-08-15 RX ORDER — ROPIVACAINE HYDROCHLORIDE 5 MG/ML
INJECTION, SOLUTION EPIDURAL; INFILTRATION; PERINEURAL
Status: COMPLETED | OUTPATIENT
Start: 2019-08-15 | End: 2019-08-15

## 2019-08-15 RX ORDER — DIPHENHYDRAMINE HCL 25 MG
25 CAPSULE ORAL
Status: DISCONTINUED | OUTPATIENT
Start: 2019-08-15 | End: 2019-08-15 | Stop reason: HOSPADM

## 2019-08-15 RX ORDER — FLUMAZENIL 0.1 MG/ML
0.2 INJECTION INTRAVENOUS AS NEEDED
Status: DISCONTINUED | OUTPATIENT
Start: 2019-08-15 | End: 2019-08-15 | Stop reason: HOSPADM

## 2019-08-15 RX ORDER — PROMETHAZINE HYDROCHLORIDE 25 MG/ML
12.5 INJECTION, SOLUTION INTRAMUSCULAR; INTRAVENOUS ONCE AS NEEDED
Status: DISCONTINUED | OUTPATIENT
Start: 2019-08-15 | End: 2019-08-15 | Stop reason: HOSPADM

## 2019-08-15 RX ORDER — ACETAMINOPHEN 325 MG/1
650 TABLET ORAL ONCE AS NEEDED
Status: DISCONTINUED | OUTPATIENT
Start: 2019-08-15 | End: 2019-08-15 | Stop reason: HOSPADM

## 2019-08-15 RX ORDER — SENNA AND DOCUSATE SODIUM 50; 8.6 MG/1; MG/1
2 TABLET, FILM COATED ORAL 2 TIMES DAILY PRN
Status: DISCONTINUED | OUTPATIENT
Start: 2019-08-15 | End: 2019-08-17 | Stop reason: HOSPADM

## 2019-08-15 RX ORDER — HYDROCODONE BITARTRATE AND ACETAMINOPHEN 7.5; 325 MG/1; MG/1
1 TABLET ORAL ONCE AS NEEDED
Status: DISCONTINUED | OUTPATIENT
Start: 2019-08-15 | End: 2019-08-15 | Stop reason: HOSPADM

## 2019-08-15 RX ORDER — PAROXETINE HYDROCHLORIDE 20 MG/1
20 TABLET, FILM COATED ORAL DAILY
Status: DISCONTINUED | OUTPATIENT
Start: 2019-08-15 | End: 2019-08-17 | Stop reason: HOSPADM

## 2019-08-15 RX ORDER — OXYCODONE AND ACETAMINOPHEN 7.5; 325 MG/1; MG/1
1 TABLET ORAL ONCE AS NEEDED
Status: DISCONTINUED | OUTPATIENT
Start: 2019-08-15 | End: 2019-08-15 | Stop reason: HOSPADM

## 2019-08-15 RX ORDER — ASPIRIN 81 MG/1
81 TABLET ORAL 2 TIMES DAILY WITH MEALS
Status: DISCONTINUED | OUTPATIENT
Start: 2019-08-15 | End: 2019-08-17 | Stop reason: HOSPADM

## 2019-08-15 RX ORDER — FENTANYL CITRATE 50 UG/ML
50 INJECTION, SOLUTION INTRAMUSCULAR; INTRAVENOUS
Status: DISCONTINUED | OUTPATIENT
Start: 2019-08-15 | End: 2019-08-15 | Stop reason: HOSPADM

## 2019-08-15 RX ORDER — CELECOXIB 200 MG/1
200 CAPSULE ORAL
Status: COMPLETED | OUTPATIENT
Start: 2019-08-15 | End: 2019-08-15

## 2019-08-15 RX ORDER — ACETAMINOPHEN 500 MG
1000 TABLET ORAL EVERY 6 HOURS PRN
Status: DISCONTINUED | OUTPATIENT
Start: 2019-08-15 | End: 2019-08-17 | Stop reason: HOSPADM

## 2019-08-15 RX ORDER — OXYCODONE AND ACETAMINOPHEN 10; 325 MG/1; MG/1
1 TABLET ORAL EVERY 4 HOURS PRN
Status: DISCONTINUED | OUTPATIENT
Start: 2019-08-15 | End: 2019-08-17 | Stop reason: HOSPADM

## 2019-08-15 RX ORDER — SODIUM CHLORIDE 0.9 % (FLUSH) 0.9 %
3 SYRINGE (ML) INJECTION EVERY 12 HOURS SCHEDULED
Status: DISCONTINUED | OUTPATIENT
Start: 2019-08-15 | End: 2019-08-17 | Stop reason: HOSPADM

## 2019-08-15 RX ORDER — FAMOTIDINE 10 MG/ML
20 INJECTION, SOLUTION INTRAVENOUS ONCE
Status: COMPLETED | OUTPATIENT
Start: 2019-08-15 | End: 2019-08-15

## 2019-08-15 RX ORDER — GLYCOPYRROLATE 0.2 MG/ML
INJECTION INTRAMUSCULAR; INTRAVENOUS AS NEEDED
Status: DISCONTINUED | OUTPATIENT
Start: 2019-08-15 | End: 2019-08-15 | Stop reason: SURG

## 2019-08-15 RX ORDER — PROMETHAZINE HYDROCHLORIDE 25 MG/1
25 SUPPOSITORY RECTAL ONCE AS NEEDED
Status: DISCONTINUED | OUTPATIENT
Start: 2019-08-15 | End: 2019-08-15 | Stop reason: HOSPADM

## 2019-08-15 RX ORDER — PROMETHAZINE HYDROCHLORIDE 25 MG/1
25 TABLET ORAL ONCE AS NEEDED
Status: DISCONTINUED | OUTPATIENT
Start: 2019-08-15 | End: 2019-08-15 | Stop reason: HOSPADM

## 2019-08-15 RX ORDER — MAGNESIUM HYDROXIDE 1200 MG/15ML
LIQUID ORAL AS NEEDED
Status: DISCONTINUED | OUTPATIENT
Start: 2019-08-15 | End: 2019-08-15 | Stop reason: HOSPADM

## 2019-08-15 RX ORDER — CHOLECALCIFEROL (VITAMIN D3) 125 MCG
5 CAPSULE ORAL NIGHTLY PRN
Status: DISCONTINUED | OUTPATIENT
Start: 2019-08-15 | End: 2019-08-17 | Stop reason: HOSPADM

## 2019-08-15 RX ORDER — BUPIVACAINE HCL/0.9 % NACL/PF 0.1 %
2 PLASTIC BAG, INJECTION (ML) EPIDURAL
Status: COMPLETED | OUTPATIENT
Start: 2019-08-15 | End: 2019-08-15

## 2019-08-15 RX ORDER — DOCUSATE SODIUM 100 MG/1
100 CAPSULE, LIQUID FILLED ORAL 2 TIMES DAILY PRN
Status: DISCONTINUED | OUTPATIENT
Start: 2019-08-15 | End: 2019-08-17 | Stop reason: HOSPADM

## 2019-08-15 RX ORDER — HYDROCHLOROTHIAZIDE 25 MG/1
25 TABLET ORAL DAILY
Status: DISCONTINUED | OUTPATIENT
Start: 2019-08-15 | End: 2019-08-17 | Stop reason: HOSPADM

## 2019-08-15 RX ORDER — DIPHENHYDRAMINE HYDROCHLORIDE 50 MG/ML
12.5 INJECTION INTRAMUSCULAR; INTRAVENOUS
Status: DISCONTINUED | OUTPATIENT
Start: 2019-08-15 | End: 2019-08-15 | Stop reason: HOSPADM

## 2019-08-15 RX ORDER — HYDRALAZINE HYDROCHLORIDE 20 MG/ML
5 INJECTION INTRAMUSCULAR; INTRAVENOUS
Status: DISCONTINUED | OUTPATIENT
Start: 2019-08-15 | End: 2019-08-15 | Stop reason: HOSPADM

## 2019-08-15 RX ORDER — HYDRALAZINE HYDROCHLORIDE 20 MG/ML
INJECTION INTRAMUSCULAR; INTRAVENOUS AS NEEDED
Status: DISCONTINUED | OUTPATIENT
Start: 2019-08-15 | End: 2019-08-15 | Stop reason: SURG

## 2019-08-15 RX ORDER — HYDROMORPHONE HYDROCHLORIDE 1 MG/ML
0.5 INJECTION, SOLUTION INTRAMUSCULAR; INTRAVENOUS; SUBCUTANEOUS
Status: DISCONTINUED | OUTPATIENT
Start: 2019-08-15 | End: 2019-08-15 | Stop reason: HOSPADM

## 2019-08-15 RX ORDER — ROCURONIUM BROMIDE 10 MG/ML
INJECTION, SOLUTION INTRAVENOUS AS NEEDED
Status: DISCONTINUED | OUTPATIENT
Start: 2019-08-15 | End: 2019-08-15 | Stop reason: SURG

## 2019-08-15 RX ORDER — KETOROLAC TROMETHAMINE 30 MG/ML
15 INJECTION, SOLUTION INTRAMUSCULAR; INTRAVENOUS EVERY 8 HOURS PRN
Status: DISCONTINUED | OUTPATIENT
Start: 2019-08-15 | End: 2019-08-17 | Stop reason: HOSPADM

## 2019-08-15 RX ORDER — TRANEXAMIC ACID 100 MG/ML
INJECTION, SOLUTION INTRAVENOUS AS NEEDED
Status: DISCONTINUED | OUTPATIENT
Start: 2019-08-15 | End: 2019-08-15 | Stop reason: SURG

## 2019-08-15 RX ORDER — PROMETHAZINE HYDROCHLORIDE 25 MG/ML
6.25 INJECTION, SOLUTION INTRAMUSCULAR; INTRAVENOUS
Status: DISCONTINUED | OUTPATIENT
Start: 2019-08-15 | End: 2019-08-15 | Stop reason: HOSPADM

## 2019-08-15 RX ORDER — MORPHINE SULFATE 2 MG/ML
6 INJECTION, SOLUTION INTRAMUSCULAR; INTRAVENOUS
Status: DISCONTINUED | OUTPATIENT
Start: 2019-08-15 | End: 2019-08-17 | Stop reason: HOSPADM

## 2019-08-15 RX ORDER — DIAZEPAM 5 MG/1
5 TABLET ORAL 2 TIMES DAILY PRN
Status: DISCONTINUED | OUTPATIENT
Start: 2019-08-15 | End: 2019-08-17 | Stop reason: HOSPADM

## 2019-08-15 RX ORDER — METOPROLOL SUCCINATE 25 MG/1
25 TABLET, EXTENDED RELEASE ORAL DAILY
Status: DISCONTINUED | OUTPATIENT
Start: 2019-08-15 | End: 2019-08-17 | Stop reason: HOSPADM

## 2019-08-15 RX ORDER — ONDANSETRON 4 MG/1
4 TABLET, ORALLY DISINTEGRATING ORAL EVERY 6 HOURS PRN
Status: DISCONTINUED | OUTPATIENT
Start: 2019-08-15 | End: 2019-08-17 | Stop reason: HOSPADM

## 2019-08-15 RX ORDER — AMLODIPINE BESYLATE 5 MG/1
5 TABLET ORAL DAILY
Status: DISCONTINUED | OUTPATIENT
Start: 2019-08-16 | End: 2019-08-17 | Stop reason: HOSPADM

## 2019-08-15 RX ORDER — NALOXONE HCL 0.4 MG/ML
0.2 VIAL (ML) INJECTION AS NEEDED
Status: DISCONTINUED | OUTPATIENT
Start: 2019-08-15 | End: 2019-08-15 | Stop reason: HOSPADM

## 2019-08-15 RX ORDER — EPHEDRINE SULFATE 50 MG/ML
5 INJECTION, SOLUTION INTRAVENOUS ONCE AS NEEDED
Status: DISCONTINUED | OUTPATIENT
Start: 2019-08-15 | End: 2019-08-15 | Stop reason: HOSPADM

## 2019-08-15 RX ORDER — FERROUS SULFATE 325(65) MG
325 TABLET ORAL
Status: DISCONTINUED | OUTPATIENT
Start: 2019-08-15 | End: 2019-08-17 | Stop reason: HOSPADM

## 2019-08-15 RX ORDER — MIDAZOLAM HYDROCHLORIDE 1 MG/ML
2 INJECTION INTRAMUSCULAR; INTRAVENOUS
Status: DISCONTINUED | OUTPATIENT
Start: 2019-08-15 | End: 2019-08-15 | Stop reason: HOSPADM

## 2019-08-15 RX ORDER — SODIUM CHLORIDE, SODIUM LACTATE, POTASSIUM CHLORIDE, CALCIUM CHLORIDE 600; 310; 30; 20 MG/100ML; MG/100ML; MG/100ML; MG/100ML
9 INJECTION, SOLUTION INTRAVENOUS CONTINUOUS
Status: DISCONTINUED | OUTPATIENT
Start: 2019-08-15 | End: 2019-08-17 | Stop reason: HOSPADM

## 2019-08-15 RX ORDER — PROPOFOL 10 MG/ML
VIAL (ML) INTRAVENOUS AS NEEDED
Status: DISCONTINUED | OUTPATIENT
Start: 2019-08-15 | End: 2019-08-15 | Stop reason: SURG

## 2019-08-15 RX ORDER — OXYCODONE AND ACETAMINOPHEN 10; 325 MG/1; MG/1
2 TABLET ORAL EVERY 4 HOURS PRN
Status: DISCONTINUED | OUTPATIENT
Start: 2019-08-15 | End: 2019-08-17 | Stop reason: HOSPADM

## 2019-08-15 RX ORDER — EPHEDRINE SULFATE 50 MG/ML
INJECTION, SOLUTION INTRAVENOUS AS NEEDED
Status: DISCONTINUED | OUTPATIENT
Start: 2019-08-15 | End: 2019-08-15 | Stop reason: SURG

## 2019-08-15 RX ORDER — BENZONATATE 100 MG/1
100 CAPSULE ORAL 3 TIMES DAILY PRN
COMMUNITY
Start: 2019-08-13 | End: 2020-03-11

## 2019-08-15 RX ORDER — BISACODYL 10 MG
10 SUPPOSITORY, RECTAL RECTAL DAILY PRN
Status: DISCONTINUED | OUTPATIENT
Start: 2019-08-15 | End: 2019-08-17 | Stop reason: HOSPADM

## 2019-08-15 RX ORDER — SODIUM CHLORIDE 450 MG/100ML
100 INJECTION, SOLUTION INTRAVENOUS CONTINUOUS
Status: DISCONTINUED | OUTPATIENT
Start: 2019-08-15 | End: 2019-08-17 | Stop reason: HOSPADM

## 2019-08-15 RX ORDER — ACETAMINOPHEN 500 MG
1000 TABLET ORAL
Status: COMPLETED | OUTPATIENT
Start: 2019-08-15 | End: 2019-08-15

## 2019-08-15 RX ORDER — ACETAMINOPHEN 325 MG/1
325 TABLET ORAL EVERY 4 HOURS PRN
Status: DISCONTINUED | OUTPATIENT
Start: 2019-08-15 | End: 2019-08-17 | Stop reason: HOSPADM

## 2019-08-15 RX ORDER — SODIUM CHLORIDE 0.9 % (FLUSH) 0.9 %
1-10 SYRINGE (ML) INJECTION AS NEEDED
Status: DISCONTINUED | OUTPATIENT
Start: 2019-08-15 | End: 2019-08-15 | Stop reason: HOSPADM

## 2019-08-15 RX ORDER — CLINDAMYCIN PHOSPHATE 900 MG/50ML
900 INJECTION INTRAVENOUS EVERY 8 HOURS
Status: COMPLETED | OUTPATIENT
Start: 2019-08-15 | End: 2019-08-16

## 2019-08-15 RX ORDER — ATORVASTATIN CALCIUM 20 MG/1
40 TABLET, FILM COATED ORAL DAILY
Status: DISCONTINUED | OUTPATIENT
Start: 2019-08-15 | End: 2019-08-17 | Stop reason: HOSPADM

## 2019-08-15 RX ORDER — MIDAZOLAM HYDROCHLORIDE 1 MG/ML
1 INJECTION INTRAMUSCULAR; INTRAVENOUS
Status: DISCONTINUED | OUTPATIENT
Start: 2019-08-15 | End: 2019-08-15 | Stop reason: HOSPADM

## 2019-08-15 RX ORDER — NALOXONE HCL 0.4 MG/ML
0.4 VIAL (ML) INJECTION
Status: DISCONTINUED | OUTPATIENT
Start: 2019-08-15 | End: 2019-08-17 | Stop reason: HOSPADM

## 2019-08-15 RX ORDER — BENZONATATE 100 MG/1
100 CAPSULE ORAL 3 TIMES DAILY PRN
Status: DISCONTINUED | OUTPATIENT
Start: 2019-08-15 | End: 2019-08-17 | Stop reason: HOSPADM

## 2019-08-15 RX ORDER — PANTOPRAZOLE SODIUM 40 MG/1
40 TABLET, DELAYED RELEASE ORAL EVERY MORNING
Status: DISCONTINUED | OUTPATIENT
Start: 2019-08-16 | End: 2019-08-17 | Stop reason: HOSPADM

## 2019-08-15 RX ORDER — LIDOCAINE HYDROCHLORIDE 10 MG/ML
0.5 INJECTION, SOLUTION EPIDURAL; INFILTRATION; INTRACAUDAL; PERINEURAL ONCE AS NEEDED
Status: DISCONTINUED | OUTPATIENT
Start: 2019-08-15 | End: 2019-08-15 | Stop reason: HOSPADM

## 2019-08-15 RX ORDER — FENTANYL CITRATE 50 UG/ML
INJECTION, SOLUTION INTRAMUSCULAR; INTRAVENOUS
Status: COMPLETED
Start: 2019-08-15 | End: 2019-08-15

## 2019-08-15 RX ORDER — LOSARTAN POTASSIUM 100 MG/1
100 TABLET ORAL DAILY
Status: DISCONTINUED | OUTPATIENT
Start: 2019-08-16 | End: 2019-08-17 | Stop reason: HOSPADM

## 2019-08-15 RX ORDER — LIDOCAINE HYDROCHLORIDE 20 MG/ML
INJECTION, SOLUTION INFILTRATION; PERINEURAL AS NEEDED
Status: DISCONTINUED | OUTPATIENT
Start: 2019-08-15 | End: 2019-08-15 | Stop reason: SURG

## 2019-08-15 RX ORDER — ONDANSETRON 2 MG/ML
INJECTION INTRAMUSCULAR; INTRAVENOUS AS NEEDED
Status: DISCONTINUED | OUTPATIENT
Start: 2019-08-15 | End: 2019-08-15 | Stop reason: SURG

## 2019-08-15 RX ORDER — SODIUM CHLORIDE 0.9 % (FLUSH) 0.9 %
1-10 SYRINGE (ML) INJECTION AS NEEDED
Status: DISCONTINUED | OUTPATIENT
Start: 2019-08-15 | End: 2019-08-17 | Stop reason: HOSPADM

## 2019-08-15 RX ADMIN — OXYCODONE HYDROCHLORIDE AND ACETAMINOPHEN 1 TABLET: 10; 325 TABLET ORAL at 19:16

## 2019-08-15 RX ADMIN — SODIUM CHLORIDE, PRESERVATIVE FREE 3 ML: 5 INJECTION INTRAVENOUS at 21:24

## 2019-08-15 RX ADMIN — PAROXETINE 20 MG: 20 TABLET, FILM COATED ORAL at 14:30

## 2019-08-15 RX ADMIN — FENTANYL CITRATE 50 MCG: 50 INJECTION, SOLUTION INTRAMUSCULAR; INTRAVENOUS at 10:05

## 2019-08-15 RX ADMIN — Medication 2 G: at 07:08

## 2019-08-15 RX ADMIN — HYDROMORPHONE HYDROCHLORIDE 0.5 MG: 1 INJECTION, SOLUTION INTRAMUSCULAR; INTRAVENOUS; SUBCUTANEOUS at 09:45

## 2019-08-15 RX ADMIN — FENTANYL CITRATE 50 MCG: 50 INJECTION INTRAMUSCULAR; INTRAVENOUS at 06:41

## 2019-08-15 RX ADMIN — FAMOTIDINE 20 MG: 10 INJECTION INTRAVENOUS at 06:36

## 2019-08-15 RX ADMIN — FENTANYL CITRATE 50 MCG: 50 INJECTION, SOLUTION INTRAMUSCULAR; INTRAVENOUS at 09:25

## 2019-08-15 RX ADMIN — TRANEXAMIC ACID 1000 MG: 100 INJECTION, SOLUTION INTRAVENOUS at 07:35

## 2019-08-15 RX ADMIN — ROPIVACAINE HYDROCHLORIDE 20 ML: 5 INJECTION, SOLUTION EPIDURAL; INFILTRATION; PERINEURAL at 06:55

## 2019-08-15 RX ADMIN — FERROUS SULFATE TAB 325 MG (65 MG ELEMENTAL FE) 325 MG: 325 (65 FE) TAB at 14:30

## 2019-08-15 RX ADMIN — ATORVASTATIN CALCIUM 40 MG: 20 TABLET, FILM COATED ORAL at 21:21

## 2019-08-15 RX ADMIN — NEOSTIGMINE METHYLSULFATE 3 MG: 1 INJECTION INTRAMUSCULAR; INTRAVENOUS; SUBCUTANEOUS at 08:54

## 2019-08-15 RX ADMIN — HYDROMORPHONE HYDROCHLORIDE 0.5 MG: 1 INJECTION, SOLUTION INTRAMUSCULAR; INTRAVENOUS; SUBCUTANEOUS at 09:33

## 2019-08-15 RX ADMIN — FENTANYL CITRATE 50 MCG: 50 INJECTION, SOLUTION INTRAMUSCULAR; INTRAVENOUS at 10:00

## 2019-08-15 RX ADMIN — CLINDAMYCIN PHOSPHATE 900 MG: 900 INJECTION, SOLUTION INTRAVENOUS at 17:03

## 2019-08-15 RX ADMIN — ASPIRIN 81 MG: 81 TABLET, COATED ORAL at 14:30

## 2019-08-15 RX ADMIN — EPHEDRINE SULFATE 5 MG: 50 INJECTION INTRAMUSCULAR; INTRAVENOUS; SUBCUTANEOUS at 09:03

## 2019-08-15 RX ADMIN — OXYCODONE HYDROCHLORIDE AND ACETAMINOPHEN 1 TABLET: 10; 325 TABLET ORAL at 23:22

## 2019-08-15 RX ADMIN — HYDRALAZINE HYDROCHLORIDE 5 MG: 20 INJECTION INTRAMUSCULAR; INTRAVENOUS at 08:20

## 2019-08-15 RX ADMIN — ACETAMINOPHEN 1000 MG: 500 TABLET, FILM COATED ORAL at 06:31

## 2019-08-15 RX ADMIN — PROPOFOL 140 MG: 10 INJECTION, EMULSION INTRAVENOUS at 07:19

## 2019-08-15 RX ADMIN — MIDAZOLAM 1 MG: 1 INJECTION INTRAMUSCULAR; INTRAVENOUS at 06:40

## 2019-08-15 RX ADMIN — KETOROLAC TROMETHAMINE 15 MG: 30 INJECTION, SOLUTION INTRAMUSCULAR at 09:45

## 2019-08-15 RX ADMIN — SODIUM CHLORIDE, POTASSIUM CHLORIDE, SODIUM LACTATE AND CALCIUM CHLORIDE: 600; 310; 30; 20 INJECTION, SOLUTION INTRAVENOUS at 09:04

## 2019-08-15 RX ADMIN — HYDRALAZINE HYDROCHLORIDE 5 MG: 20 INJECTION INTRAMUSCULAR; INTRAVENOUS at 08:13

## 2019-08-15 RX ADMIN — ROCURONIUM BROMIDE 50 MG: 10 INJECTION INTRAVENOUS at 07:19

## 2019-08-15 RX ADMIN — CELECOXIB 200 MG: 200 CAPSULE ORAL at 06:31

## 2019-08-15 RX ADMIN — FENTANYL CITRATE 50 MCG: 50 INJECTION, SOLUTION INTRAMUSCULAR; INTRAVENOUS at 09:51

## 2019-08-15 RX ADMIN — ONDANSETRON 4 MG: 2 INJECTION INTRAMUSCULAR; INTRAVENOUS at 07:57

## 2019-08-15 RX ADMIN — FENTANYL CITRATE 100 MCG: 50 INJECTION INTRAMUSCULAR; INTRAVENOUS at 07:45

## 2019-08-15 RX ADMIN — SODIUM CHLORIDE, POTASSIUM CHLORIDE, SODIUM LACTATE AND CALCIUM CHLORIDE 9 ML/HR: 600; 310; 30; 20 INJECTION, SOLUTION INTRAVENOUS at 06:35

## 2019-08-15 RX ADMIN — HYDROMORPHONE HYDROCHLORIDE 0.5 MG: 1 INJECTION, SOLUTION INTRAMUSCULAR; INTRAVENOUS; SUBCUTANEOUS at 09:57

## 2019-08-15 RX ADMIN — EPHEDRINE SULFATE 10 MG: 50 INJECTION INTRAMUSCULAR; INTRAVENOUS; SUBCUTANEOUS at 07:38

## 2019-08-15 RX ADMIN — EPHEDRINE SULFATE 5 MG: 50 INJECTION INTRAMUSCULAR; INTRAVENOUS; SUBCUTANEOUS at 08:59

## 2019-08-15 RX ADMIN — GLYCOPYRROLATE 0.4 MG: 0.2 INJECTION INTRAMUSCULAR; INTRAVENOUS at 08:54

## 2019-08-15 RX ADMIN — OXYCODONE HYDROCHLORIDE AND ACETAMINOPHEN 1 TABLET: 10; 325 TABLET ORAL at 14:30

## 2019-08-15 RX ADMIN — ASPIRIN 81 MG: 81 TABLET, COATED ORAL at 21:21

## 2019-08-15 RX ADMIN — FENTANYL CITRATE 50 MCG: 50 INJECTION INTRAMUSCULAR; INTRAVENOUS at 07:18

## 2019-08-15 RX ADMIN — LIDOCAINE HYDROCHLORIDE 50 MG: 20 INJECTION, SOLUTION INFILTRATION; PERINEURAL at 07:19

## 2019-08-15 RX ADMIN — Medication 2 G: at 08:48

## 2019-08-15 RX ADMIN — MUPIROCIN 1 APPLICATION: 20 OINTMENT TOPICAL at 21:21

## 2019-08-15 NOTE — ANESTHESIA PROCEDURE NOTES
Airway  Urgency: elective    Date/Time: 8/15/2019 7:08 AM  Difficult airway    General Information and Staff    Patient location during procedure: OR  Anesthesiologist: China Mcallister MD    Indications and Patient Condition  Indications for airway management: airway protection    Preoxygenated: yes  MILS maintained throughout  Mask difficulty assessment: 4 - unable to mask vent +/- NMBA    Final Airway Details  Final airway type: endotracheal airway      Successful airway: ETT  Cuffed: yes   Successful intubation technique: direct laryngoscopy  Facilitating devices/methods: Bougie  Endotracheal tube insertion site: oral  Blade: Da  Blade size: 3  ETT size (mm): 7.0  Cormack-Lehane Classification: grade IIb - view of arytenoids or posterior of glottis only  Placement verified by: chest auscultation and capnometry   Measured from: lips  ETT to lips (cm): 20  Number of attempts at approach: 2

## 2019-08-15 NOTE — PLAN OF CARE
Problem: Patient Care Overview  Goal: Plan of Care Review  Outcome: Ongoing (interventions implemented as appropriate)   08/15/19 6733   OTHER   Outcome Summary good pain control with PO pain meds, ambulating assist of 1 with walker, VSS-bp running low, NVI, educated on bp meds and monitoring   Coping/Psychosocial   Plan of Care Reviewed With patient   Plan of Care Review   Progress improving     Goal: Individualization and Mutuality  Outcome: Ongoing (interventions implemented as appropriate)      Problem: Fall Risk (Adult)  Goal: Identify Related Risk Factors and Signs and Symptoms  Outcome: Outcome(s) achieved Date Met: 08/15/19    Goal: Absence of Fall  Outcome: Ongoing (interventions implemented as appropriate)      Problem: Knee Arthroplasty (Total, Partial) (Adult)  Goal: Signs and Symptoms of Listed Potential Problems Will be Absent, Minimized or Managed (Knee Arthroplasty)  Outcome: Outcome(s) achieved Date Met: 08/15/19    Goal: Anesthesia/Sedation Recovery  Outcome: Ongoing (interventions implemented as appropriate)

## 2019-08-15 NOTE — PLAN OF CARE
Problem: Patient Care Overview  Goal: Plan of Care Review   08/15/19 1420   OTHER   Outcome Summary Pt admitted s/p L TKA today; h/o R TKA few months ago. Independent prior to this surgery, no AD. Pt demonstrates impaired L knee ROM, strength, gait pattern and indepedence w/ mobility but able to ambulate w/ contact assist in room w/ RW. performed L TKA exercises w/ ed for HEP. No equipment needs. Discussed goal for pt to be ready from mobility standpoint to DC home w/ home health 8/16.

## 2019-08-15 NOTE — ANESTHESIA POSTPROCEDURE EVALUATION
Patient: Ashley Romero    Procedure Summary     Date:  08/15/19 Room / Location:  [facility] OR [address] [place] [address] MAIN OR    Anesthesia Start:  0708 Anesthesia Stop:  0921    Procedure:  TOTAL KNEE ARTHROPLASTY (Left Knee) Diagnosis:      Surgeon:  Estevan Esquivel MD Provider:  China Mcallister MD    Anesthesia Type:  general ASA Status:  3          Anesthesia Type: general  Last vitals  BP   105/58 (08/15/19 0955)   Temp   37.4 °C (99.4 °F) (08/15/19 0917)   Pulse   63 (08/15/19 0955)   Resp   16 (08/15/19 0955)     SpO2   94 % (08/15/19 0955)     Post Anesthesia Care and Evaluation    Patient location during evaluation: bedside  Patient participation: complete - patient participated  Level of consciousness: awake and alert  Pain management: adequate  Airway patency: patent  Anesthetic complications: No anesthetic complications  PONV Status: controlled  Cardiovascular status: acceptable  Respiratory status: acceptable  Hydration status: acceptable

## 2019-08-15 NOTE — ADDENDUM NOTE
Addendum  created 08/15/19 1306 by Jason Rice MD    Review and Sign - Ready for Procedure, Sign clinical note

## 2019-08-15 NOTE — DISCHARGE PLACEMENT REQUEST
"Ashley Romero (64 y.o. Female)     Date of Birth Social Security Number Address Home Phone MRN    1955  [id number] [address] [phone] [id number]    [employer] Mar[employer] Status          Hinduism        Admission Date Admission Type Admitting Provider Attending Provider Department, Room/Bed    8/15/19 Elective Estevan Esquivel MD Goodin, Robert A, MD [address], P794/1    Discharge Date Discharge Disposition Discharge Destination                       Attending Provider:  Estevan Esquivel MD    Allergies:  Codeine    Isolation:  None   Infection:  None   Code Status:  CPR    Ht:  154.9 cm (60.98\")   Wt:  96.8 kg (213 lb 6.5 oz)    Admission Cmt:  None   Principal Problem:  None                Active Insurance as of 8/15/2019     Primary Coverage     Payor Plan Insurance Group Employer/Plan Group    HUMANA HUMANA [id number]     Payor Plan Address Payor Plan Phone Number Payor Plan Fax Number Effective Dates    [address] [address] [phone]  8/1/2018 - None Entered    [facility], located [facility] [address]       Subscriber Name Subscriber Birth Date Member ID       KO ROMERO 11/7/1962 [id number]                 Emergency Contacts      (Rel.) Home Phone Work Phone Mobile Phone    Haile Romero (Spouse) -- -- [phone]            "

## 2019-08-15 NOTE — OP NOTE
Orthopaedic Surgery Operative Note    Patient Name:  Ashley Romero  YOB: 1955  Medical Records Number:  [id number]    Date of Procedure:  8/15/2019    Pre-operative Diagnosis:  Primary Osteoarthritis Left Knee    Post-operative Diagnosis:  Primary Osteoarthritis Left Knee    Procedure Performed:  Left Total Knee Arthroplasty    Implants:  Biomet Vanguard TKA, 65 Femoral Component, 71 Tibial Tray with a 40 mm Modular Stem, 34 3-Peg Patella, 12 lipped  Polyethylene Insert    Surgeon:  Estevan Esquivel M.D.    Assistant: Kasey Gee (who was present during the critical portions of the case, thereby decreasing operative time and patient morbidity)    Anesthetic Type:  General    Estimated Blood Loss:  250cc's    Specimens: * No orders in the log *    No Complications      Indications for Procedure:  Ashley Romero is a 64 y.o. female suffering from end stage degenerative changes in the left knee.  The patients pain is becoming disabling, despite extensive conservative care, including NSAIDS, therapy and injections. The risks, benefits and alternatives were discussed and the patient wishes to proceed with left total knee arthroplasty.      Procedure Performed:    After informed consent was obtained and pre-operative IV Kefzol  given, prior to tourniquet inflation, the patient was taken to the operating room and placed supine on the operating table.  After general anesthesia induced and 1 gm of Tranxemic Acid given,  the patient's left lower extremity was prepped with chloraprep and draped in a sterile fashion.    A midline incision was made overlying the left knee and we sharply dissected down to expose the parapatellar retinaculum.  A mid-vastus, muscle sparing, parapatellar arthrotomy was performed and we elevated the soft tissue both medially and laterally.  The menisci and ACL were excised.  We everted the patella and measured this to be 24 mm and we removed 9 mm of bone down to 15mm.  We measured  the patella to be 34 and drilled our three drill holes.  We protected the patella with the patella protector and turned our attention to the femur and the tibia.    We drilled drill holes into the femoral and the tibial intramedullary canals and proceeded to irrigate the canals to remove the fatty marrow.  We used the intramedullary ruchi and the 5 degree valgus cut block to make our distal femoral cut.  Once we had a smooth surface, we measured the femur to be 65.  We assured we had rotational alignment using the epicondylar axis, then we used the four-in-one cut block to make our anterior, posterior, anterior and posterior chamfer cuts.  We placed our femoral trial, had excellent fit, and extended the knee and marked [place]'s line on the tibia.      We then turned our attention to the tibia, where we irrigated the canal again.  We used the intramedullary ruchi and the 3 degree posterior sloped cut block to remove 2 mm of bone from the effected side of the tibia.  Prior to making our cut, we assured we had rotational alignment using the external guide and [place]'s line.  Once we had a smooth surface, we measured the tibia to be 71.  We then removed soft tissue and bony debris from the posterior aspect of the knee.    We placed our trial implants and had excellent fit, range of motion, stability and ligament balance, throughout a complete arc of motion with a 12 lipped polyethylene liner.  We removed our trial implants, punched the tibial keel, copiously irrigated the knee, then cemented our implants in place using Biomet cement.  Once the cement cured, we trialed again with the 10, 12 and 14 AS, standard and posterior lipped polyethylene liners.  The 12 lipped gave us the best range of motion, stability and ligament balance, throughout a complete arc of motion.  We removed the trials, copiously irrigated the knee, gave IV antibiotics and local anesthetic, then placed our permanent polyethylene liner.  We placed a  "1/8\" hemovac drain, then closed the arthrotomy with #1 Vicryl pop-off sutures.  The subcutaneous tissue was closed with 2-0 vicryl pop-off sutures.  The skin was closed with staples.  We placed a sterile dressing of Xeroform, 4x4's, abdominal pads, cast padding and an Ace Wrap.  The patient was then awakened from general anesthesia and taken to the recovery room in stable condition.    The patient will be started on Aspirin 81 mg twice daily for DVT prophylaxis.  IV antibiotics will be discontinued within 24 hours of surgery.  Immediately prior to surgery, there were no acute Thromboembolic nor Cardiovascular risk factors.  An updated Medical Reconciliation form is on the chart.    Estevan Esquivel MD, 8/15/2019        "

## 2019-08-15 NOTE — THERAPY EVALUATION
Patient Name: Ashley Romero  : 1955    MRN: [id number]                              Today's Date: 8/15/2019       Admit Date: 8/15/2019    Visit Dx: No diagnosis found.  Patient Active Problem List   Diagnosis   • OA (osteoarthritis) of knee   • Ascending aorta dilatation (CMS/HCC)   • Benign essential hypertension   • Hyperlipidemia   • Vasovagal syncope     Past Medical History:   Diagnosis Date   • Aortic dilatation (CMS/HCC)     ASCENDING   • Arthritis    • Owens esophagus    • Depression    • GERD (gastroesophageal reflux disease)    • History of ulcer disease    • Hyperlipidemia    • Hypertension    • Right knee pain    • Sleep apnea     CPAP     Past Surgical History:   Procedure Laterality Date   • COLONOSCOPY     • ENDOSCOPY     • TONSILLECTOMY     • TOTAL KNEE ARTHROPLASTY Right 3/14/2019    Procedure: RIGHT TOTAL KNEE ARTHROPLASTY;  Surgeon: Estevan Esquivel MD;  Location: [facility];  Service: Orthopedics   • TOTAL KNEE ARTHROPLASTY Right 2019   • TUBAL ABDOMINAL LIGATION       General Information     Row Name 08/15/19 1423          PT Evaluation Time/Intention    Document Type  evaluation  -AR     Mode of Treatment  physical therapy  -AR     Row Name 08/15/19 1423          General Information    Patient Profile Reviewed?  yes  -AR     Prior Level of Function  independent:  -AR     Existing Precautions/Restrictions  fall  -AR     Barriers to Rehab  none identified  -AR     Row Name 08/15/19 1425 08/15/19 1423       Relationship/Environment    Lives With  spouse  -EC  spouse  -AR    Family Caregiver if Needed  spouse  -EC  --    Row Name 08/15/19 0607          Relationship/Environment    Primary Source of Support/Comfort  spouse  -SK     Name of Support/Comfort Primary Source  -- STEFANY-   -SK     Lives With  spouse  -SK     Family Caregiver if Needed  spouse  -SK     Family Caregiver Names  -- STEFANY-   -SK     Primary Roles/Responsibilities  retired  -SK     Row Name  08/15/19 1425 08/15/19 1423       Resource/Environmental Concerns    Current Living Arrangements  home/apartment/condo  -EC  home/apartment/condo  -AR    Resource/Environmental Concerns  none  -EC  --    Row Name 08/15/19 1043 08/15/19 0607       Resource/Environmental Concerns    Current Living Arrangements  home/apartment/condo  -MN  home/apartment/condo  -SK    Resource/Environmental Concerns  none  -MN  none  -SK    Row Name 08/15/19 1423          Home Main Entrance    Number of Stairs, Main Entrance  two  -AR     Row Name 08/15/19 1423          Cognitive Assessment/Intervention- PT/OT    Orientation Status (Cognition)  oriented x 4  -AR       User Key  (r) = Recorded By, (t) = Taken By, (c) = Cosigned By    Initials Name Provider Type    SK Maddy Downs RN Registered Nurse    Nisa Roper RN Registered Nurse    Dee Atkins, PT Physical Therapist    Katherine Serna         Mobility     Row Name 08/15/19 1423          Bed Mobility Assessment/Treatment    Bed Mobility Assessment/Treatment  supine-sit;sit-supine  -AR     Supine-Sit [place] (Bed Mobility)  supervision  -AR     Sit-Supine [place] (Bed Mobility)  not tested  -AR     Assistive Device (Bed Mobility)  head of bed elevated  -AR     Row Name 08/15/19 1423          Sit-Stand Transfer    Sit-Stand [place] (Transfers)  contact guard  -AR     Assistive Device (Sit-Stand Transfers)  walker, front-wheeled  -AR     Row Name 08/15/19 1423          Gait/Stairs Assessment/Training    [place] Level (Gait)  contact guard  -AR     Assistive Device (Gait)  walker, front-wheeled  -AR     Distance in Feet (Gait)  3'  limited by dizziness and nausea  -AR     Deviations/Abnormal Patterns (Gait)  festinating/shuffling;allen decreased;antalgic  -AR     Left Sided Gait Deviations  heel strike decreased;weight shift ability decreased  -AR       User Key  (r) = Recorded By, (t) = Taken By, (c) = Cosigned By     Initials Name Provider Type    AR Dee Landaverde, PT Physical Therapist        Obj/Interventions     Row Name 08/15/19 1424          General ROM    GENERAL ROM COMMENTS  WFL except L knee  -AR     Row Name 08/15/19 1424          MMT (Manual Muscle Testing)    General MMT Comments  WFL except LLE   -AR     Row Name 08/15/19 1424          Therapeutic Exercise    Comment (Therapeutic Exercise)  L TKA program 10x w/ ed for HEP  -AR     Row Name 08/15/19 1424          Static Sitting Balance    Level of [place] (Unsupported Sitting, Static Balance)  independent  -AR     Sitting Position (Unsupported Sitting, Static Balance)  sitting on edge of bed  -AR     Time Able to Maintain Position (Unsupported Sitting, Static Balance)  3 to 4 minutes  -AR       User Key  (r) = Recorded By, (t) = Taken By, (c) = Cosigned By    Initials Name Provider Type    AR Dee Landaverde, PT Physical Therapist        Goals/Plan     Row Name 08/15/19 1426          Transfer Goal 1 (PT)    Activity/Assistive Device (Transfer Goal 1, PT)  sit-to-stand/stand-to-sit;walker, rolling  -AR     [place] Level/Cues Needed (Transfer Goal 1, PT)  supervision required  -AR     Time Frame (Transfer Goal 1, PT)  1 week  -AR     Row Name 08/15/19 1426          Gait Training Goal 1 (PT)    Activity/Assistive Device (Gait Training Goal 1, PT)  gait (walking locomotion);walker, rolling  -AR     [place] Level (Gait Training Goal 1, PT)  supervision required  -AR     Distance (Gait Goal 1, PT)  100  -AR     Time Frame (Gait Training Goal 1, PT)  1 week  -AR     Row Name 08/15/19 1426          ROM Goal 1 (PT)    ROM Goal 1 (PT)  L knee 0-90  -AR     Time Frame (ROM Goal 1, PT)  1 week  -AR     Row Name 08/15/19 1426          Stairs Goal 1 (PT)    Activity/Assistive Device (Stairs Goal 1, PT)  stairs, all skills  -AR     [place] Level/Cues Needed (Stairs Goal 1, PT)  contact guard assist  -AR     Number of Stairs (Stairs Goal 1, PT)  2  -AR        User Key  (r) = Recorded By, (t) = Taken By, (c) = Cosigned By    Initials Name Provider Type    Dee Atkins, PT Physical Therapist        Clinical Impression     Row Name 08/15/19 1425          Pain Assessment    Additional Documentation  Pain Scale: Numbers Pre/Post-Treatment (Group)  -AR     Row Name 08/15/19 1425          Pain Scale: Numbers Pre/Post-Treatment    Pain Scale: Numbers, Pretreatment  3/10  -AR     Pain Scale: Numbers, Post-Treatment  4/10  -AR     Pain Location - Side  Left  -AR     Pain Location  knee  -AR     Pain Intervention(s)  Medication (See MAR);Cold applied;Repositioned  -AR     Row Name 08/15/19 1425 08/15/19 0625       Plan of Care Review    Plan of Care Reviewed With  patient;spouse  -AR  patient  -SK    Row Name 08/15/19 1425          Physical Therapy Clinical Impression    Patient/Family Goals Statement (PT Clinical Impression)  s/p L TKA today, goal for DC to home 8/16  -AR     Criteria for Skilled Interventions Met (PT Clinical Impression)  yes  -AR     Rehab Potential (PT Clinical Summary)  good, to achieve stated therapy goals  -AR     Row Name 08/15/19 1425          Vital Signs    O2 Delivery Pre Treatment  supplemental O2  -AR     Row Name 08/15/19 1425          Positioning and Restraints    Pre-Treatment Position  in bed  -AR     Post Treatment Position  chair  -AR     In Chair  notified nsg;reclined;sitting;call light within reach;encouraged to call for assist;exit alarm on;with family/caregiver  -AR       User Key  (r) = Recorded By, (t) = Taken By, (c) = Cosigned By    Initials Name Provider Type    SK Maddy Downs RN Registered Nurse    Dee Atkins, PT Physical Therapist        Outcome Measures     Row Name 08/15/19 1427          How much help from another person do you currently need...    Turning from your back to your side while in flat bed without using bedrails?  3  -AR     Moving from lying on back to sitting on the side of a flat bed without  bedrails?  3  -AR     Moving to and from a bed to a chair (including a wheelchair)?  3  -AR     Standing up from a chair using your arms (e.g., wheelchair, bedside chair)?  3  -AR     Climbing 3-5 steps with a railing?  2  -AR     To walk in hospital room?  3  -AR     AM-PAC 6 Clicks Score (PT)  17  -AR     Row Name 08/15/19 1427          Functional Assessment    Outcome Measure Options  AM-PAC 6 Clicks Basic Mobility (PT)  -AR       User Key  (r) = Recorded By, (t) = Taken By, (c) = Cosigned By    Initials Name Provider Type    AR Dee Landaverde, PT Physical Therapist        Physical Therapy Education     Title: PT OT SLP Therapies (In Progress)     Topic: Physical Therapy (In Progress)     Point: Mobility training (In Progress)     Learning Progress Summary           Patient Acceptance, E, NR by AR at 8/15/2019  2:28 PM                   Point: Home exercise program (In Progress)     Learning Progress Summary           Patient Acceptance, E, NR by AR at 8/15/2019  2:28 PM                   Point: Body mechanics (In Progress)     Learning Progress Summary           Patient Acceptance, E, NR by AR at 8/15/2019  2:28 PM                   Point: Precautions (In Progress)     Learning Progress Summary           Patient Acceptance, E, NR by AR at 8/15/2019  2:28 PM                               User Key     Initials Effective Dates Name Provider Type Discipline    AR 04/03/18 -  Dee Landaverde, PT Physical Therapist PT              PT Recommendation and Plan     Outcome Summary/Treatment Plan (PT)  Anticipated Discharge Disposition (PT): home with assist, home with home health  Plan of Care Reviewed With: patient, spouse  Outcome Summary: Pt admitted s/p L TKA today; h/o R TKA few months ago.  Independent prior to this surgery, no AD.  Pt demonstrates impaired L knee ROM, strength, gait pattern and indepedence w/ mobility but able to ambulate w/ contact assist in room w/ RW.  performed L TKA exercises w/ ed for HEP.  No equipment needs.  Discussed goal for pt to be ready from mobility standpoint to DC home w/ home health 8/16.       Time Calculation:   PT Charges     Row Name 08/15/19 1427             Time Calculation    Start Time  1358  -AR      Stop Time  1427  -AR      Time Calculation (min)  29 min  -AR      PT Received On  08/15/19  -AR      PT - Next Appointment  08/16/19  -AR      PT Goal Re-Cert Due Date  08/22/19  -AR        User Key  (r) = Recorded By, (t) = Taken By, (c) = Cosigned By    Initials Name Provider Type    AR Dee Landaverde, PT Physical Therapist        Therapy Charges for Today     Code Description Service Date Service Provider Modifiers Qty    93137310037 HC PT EVAL LOW COMPLEXITY 2 8/15/2019 Dee Landaverde, PT GP 1    83163318215 HC PT THER PROC EA 15 MIN 8/15/2019 Dee Landaverde, PT GP 1          PT G-Codes  Outcome Measure Options: AM-PAC 6 Clicks Basic Mobility (PT)  AM-PAC 6 Clicks Score (PT): 17    Dee Landaverde PT  8/15/2019

## 2019-08-15 NOTE — H&P
Orthopaedic Surgery History and Physical    Patient Name:  Ashley Romero  YOB: 1955    Medical Records Number:  [id number]    Date of Admission:  8/15/2019  5:47 AM    Chief Complaint:  OA (osteoarthritis) of knee [M17.10]    Ashley Romero is a 64 y.o. female who presents c/o severe left knee pain.  The pain has been on and off for many years, worsening to the point where the pain is becoming disabling.  The pain is a constant dull ache with occasional sharp, stabbing pain.  The patient has failed conservative treatment and would like to proceed with left total knee arthroplasty.    /73 (BP Location: Right arm, Patient Position: Lying)   Pulse 66   Temp 98.6 °F (37 °C) (Oral)   Resp 14   Wt 96.8 kg (213 lb 6.5 oz)   SpO2 97%   BMI 40.32 kg/m²     Past Medical History:    Past Medical History:   Diagnosis Date   • Aortic dilatation (CMS/HCC)     ASCENDING   • Arthritis    • Owens esophagus    • Depression    • GERD (gastroesophageal reflux disease)    • History of ulcer disease    • Hyperlipidemia    • Hypertension    • Right knee pain    • Sleep apnea     CPAP       Social History:    Social History     Socioeconomic History   • Marital status:      Spouse name: Not on file   • Number of children: Not on file   • Years of education: Not on file   • Highest education level: Not on file   Tobacco Use   • Smoking status: Never Smoker   • Smokeless tobacco: Never Used   Substance and Sexual Activity   • Alcohol use: Yes     Comment: OCCASIONALLY   • Drug use: No   • Sexual activity: Defer       Family History:    Family History   Problem Relation Age of Onset   • Malig Hyperthermia Neg Hx        Current Medications:    Current Facility-Administered Medications:   •  ceFAZolin in 0.9% normal saline (ANCEF) IVPB solution 2 g, 2 g, Intravenous, 30 Min Pre-Op, Estevan Esquivel MD  •  fentaNYL citrate (PF) (SUBLIMAZE) injection 50 mcg, 50 mcg, Intravenous, Q10 Min PRN, Esvin  MD China, 50 mcg at 08/15/19 0641  •  lactated ringers infusion, 9 mL/hr, Intravenous, Continuous, China Mcallister MD, Last Rate: 9 mL/hr at 08/15/19 0635, 9 mL/hr at 08/15/19 0635  •  lidocaine PF 1% (XYLOCAINE) injection 0.5 mL, 0.5 mL, Injection, Once PRN, China Mcallister MD  •  midazolam (VERSED) injection 1 mg, 1 mg, Intravenous, Q5 Min PRN, 1 mg at 08/15/19 0640 **OR** midazolam (VERSED) injection 2 mg, 2 mg, Intravenous, Q5 Min PRN, China Mcallister MD  •  ropivacaine (NAROPIN) 0.5 % 50 mL, Morphine (PF) 5 mg, ketorolac (TORADOL) 30 mg, EPINEPHrine (ADRENALIN) 0.3 mg in sodium chloride 0.9 % 101.8 mL, , Injection, Once, Estevan Esquivel MD  •  sodium chloride 0.9 % flush 1-10 mL, 1-10 mL, Intravenous, PRN, China Mcallister MD    Allergies:    Allergies   Allergen Reactions   • Codeine Nausea Only       Review of Systems:   HEENT: Patient denies any headaches, vision changes, change in hearing, or tinnitus, Patient denies any rhinorrhea,epistaxis, sinus pain, mouth or dental problems, sore throat or hoarseness, or dysphagia  Pulmonary: Patient denies any cough, congestion, SOA, or wheezing  Cardiovascular: Patient denies any chest pain, dyspnea, palpitations, weakness, intolerance of exercise, varicosities, swelling of extremities, known murmur  Gastrointestinal:  Patient denies nausea, vomiting, diarrhea, constipation, loss  of appetite, change in appetite, dysphagia, gas, heartburn, melena, change in bowel habits, use of laxatives or other drugs to alter the function of the gastrointestinal tract.  Genital/Urinary: Patient denies dysuria, change in color of urine, change in frequency of urination, pain with urgency, incontinence, retention, or nocturia.  Musculoskeletal: Patient denies increased warmth; redness; or swelling of joints; limitation of function; deformity; crepitation: pain in a joint or an extremity, the neck, or the back, especially with movement.  Neurological: Patient denies dizziness,  tremor, ataxia, difficulty in speaking, change in speech, paresthesia, loss of sensation, seizures, syncope, changes in memory.  Endocrine system: Patient denies tremors, palpitations, intolerance of heat or cold, polyuria, polydipsia, polyphagia, diaphoresis, exophthalmos, or goiter.  Psychological: Patient denies thoughts/plans or harming self or other; depression,  insomnia, night terrors, kaz, memory loss, disorientation.  Skin: Patient denies any bruising, rashes, discoloration, pruritus, wounds, ulcers, decubiti, changes in the hair or nails  Hematopoietic: Patient denies history of spontaneous or excessive bleeding, epistaxis, hematuria, melena, fatigue, enlarged or tender lymph nodes, pallor, history of anemia.        Physical Exam:   Awake, A&O x3, affect normal, no acute distress  Ambulating with a limp due to knee pain  Knee ROM is limited due to pain (5-115)  Strength is 4/5 in the quad, hamstring and calf  Cap refill is normal, Sensation intact    Card:  RR, HD Stable  Pulm:  Regular breathing, no S.O.A  Abd:  Soft, NT, ND    Lab Results (last 24 hours)     ** No results found for the last 24 hours. **          Xr Knee 1 Or 2 View Left    Result Date: 8/5/2019  Narrative: PA AND LATERAL RADIOGRAPHIC VIEWS OF THE CHEST AND 2 RADIOGRAPHIC VIEWS OF THE LEFT KNEE  CLINICAL HISTORY: Preop left knee arthroplasty.  FINDINGS: Two radiographic views of the left knee demonstrate marked tricompartmental osteoarthritic change with joint space narrowing, subchondral sclerosis, and marginal osteophyte formation. There is some increased density in the region of the suprapatellar bursa suggesting a joint effusion. Otherwise, no acute abnormality seen within the left knee.  PA and lateral radiographic views of the chest demonstrate mild increase in the lung markings in the left base compatible with either mild chronic interstitial changes or mild left basilar atelectatic change. The right lung is clear of acute  infiltrates. The cardiomediastinal silhouette is within normal limits. Osseous structures are incidentally notable for degenerative phenomena within the thoracic spine.  This report was finalized on 8/5/2019 8:37 AM by Dr. John Carranza M.D.      Xr Chest Pa & Lateral    Result Date: 8/5/2019  Narrative: PA AND LATERAL RADIOGRAPHIC VIEWS OF THE CHEST AND 2 RADIOGRAPHIC VIEWS OF THE LEFT KNEE  CLINICAL HISTORY: Preop left knee arthroplasty.  FINDINGS: Two radiographic views of the left knee demonstrate marked tricompartmental osteoarthritic change with joint space narrowing, subchondral sclerosis, and marginal osteophyte formation. There is some increased density in the region of the suprapatellar bursa suggesting a joint effusion. Otherwise, no acute abnormality seen within the left knee.  PA and lateral radiographic views of the chest demonstrate mild increase in the lung markings in the left base compatible with either mild chronic interstitial changes or mild left basilar atelectatic change. The right lung is clear of acute infiltrates. The cardiomediastinal silhouette is within normal limits. Osseous structures are incidentally notable for degenerative phenomena within the thoracic spine.  This report was finalized on 8/5/2019 8:37 AM by Dr. John Carranza M.D.          Assessment:  End-stage Primary Left Knee Osteoarthritis    Plan:  Patient's pain is becoming disabling, despite extensive conservative treatment.  Radiographs reveal end-stage degenerative changes.  The risks of surgery, including, but not limited to, heart attack, stroke, dying, DVT, nerve injury, vascular injury, arthrofibrosis and infection were discussed.  The alternatives and benefits were also discussed.  All questions answered and the patient wishes to proceed with left total knee arthroplasty.    Estevan Esquivel MD  8/15/2019    CC: Andreia Schaefer MD, Estevan Esquivel MD     Orthopaedic Surgery History and Physical    Patient Name:   Ashley Romero  YOB: 1955    Medical Records Number:  [id number]    Date of Admission:  8/15/2019  5:47 AM    Chief Complaint:  OA (osteoarthritis) of knee [M17.10]    Ashley Romero is a 64 y.o. female who presents c/o severe left knee pain.  The pain has been on and off for many years, worsening to the point where the pain is becoming disabling.  The pain is a constant dull ache with occasional sharp, stabbing pain.  The patient has failed conservative treatment and would like to proceed with left total knee arthroplasty.    /73 (BP Location: Right arm, Patient Position: Lying)   Pulse 66   Temp 98.6 °F (37 °C) (Oral)   Resp 14   Wt 96.8 kg (213 lb 6.5 oz)   SpO2 97%   BMI 40.32 kg/m²     Past Medical History:    Past Medical History:   Diagnosis Date   • Aortic dilatation (CMS/HCC)     ASCENDING   • Arthritis    • Owens esophagus    • Depression    • GERD (gastroesophageal reflux disease)    • History of ulcer disease    • Hyperlipidemia    • Hypertension    • Right knee pain    • Sleep apnea     CPAP       Social History:    Social History     Socioeconomic History   • Marital status:      Spouse name: Not on file   • Number of children: Not on file   • Years of education: Not on file   • Highest education level: Not on file   Tobacco Use   • Smoking status: Never Smoker   • Smokeless tobacco: Never Used   Substance and Sexual Activity   • Alcohol use: Yes     Comment: OCCASIONALLY   • Drug use: No   • Sexual activity: Defer       Family History:    Family History   Problem Relation Age of Onset   • Malig Hyperthermia Neg Hx        Current Medications:    Current Facility-Administered Medications:   •  ceFAZolin in 0.9% normal saline (ANCEF) IVPB solution 2 g, 2 g, Intravenous, 30 Min Pre-Op, Estevan Esquivel MD  •  fentaNYL citrate (PF) (SUBLIMAZE) injection 50 mcg, 50 mcg, Intravenous, Q10 Min PRN, China Mcallister MD, 50 mcg at 08/15/19 0641  •  lactated ringers infusion,  9 mL/hr, Intravenous, Continuous, China Mcallister MD, Last Rate: 9 mL/hr at 08/15/19 0635, 9 mL/hr at 08/15/19 0635  •  lidocaine PF 1% (XYLOCAINE) injection 0.5 mL, 0.5 mL, Injection, Once PRN, China Mcallister MD  •  midazolam (VERSED) injection 1 mg, 1 mg, Intravenous, Q5 Min PRN, 1 mg at 08/15/19 0640 **OR** midazolam (VERSED) injection 2 mg, 2 mg, Intravenous, Q5 Min PRN, China Mcallister MD  •  ropivacaine (NAROPIN) 0.5 % 50 mL, Morphine (PF) 5 mg, ketorolac (TORADOL) 30 mg, EPINEPHrine (ADRENALIN) 0.3 mg in sodium chloride 0.9 % 101.8 mL, , Injection, Once, Estevan Esquivel MD  •  sodium chloride 0.9 % flush 1-10 mL, 1-10 mL, Intravenous, PRN, China Mcallister MD    Allergies:    Allergies   Allergen Reactions   • Codeine Nausea Only       Review of Systems:   HEENT: Patient denies any headaches, vision changes, change in hearing, or tinnitus, Patient denies any rhinorrhea,epistaxis, sinus pain, mouth or dental problems, sore throat or hoarseness, or dysphagia  Pulmonary: Patient denies any cough, congestion, SOA, or wheezing  Cardiovascular: Patient denies any chest pain, dyspnea, palpitations, weakness, intolerance of exercise, varicosities, swelling of extremities, known murmur  Gastrointestinal:  Patient denies nausea, vomiting, diarrhea, constipation, loss  of appetite, change in appetite, dysphagia, gas, heartburn, melena, change in bowel habits, use of laxatives or other drugs to alter the function of the gastrointestinal tract.  Genital/Urinary: Patient denies dysuria, change in color of urine, change in frequency of urination, pain with urgency, incontinence, retention, or nocturia.  Musculoskeletal: Patient denies increased warmth; redness; or swelling of joints; limitation of function; deformity; crepitation: pain in a joint or an extremity, the neck, or the back, especially with movement.  Neurological: Patient denies dizziness, tremor, ataxia, difficulty in speaking, change in speech, paresthesia,  loss of sensation, seizures, syncope, changes in memory.  Endocrine system: Patient denies tremors, palpitations, intolerance of heat or cold, polyuria, polydipsia, polyphagia, diaphoresis, exophthalmos, or goiter.  Psychological: Patient denies thoughts/plans or harming self or other; depression,  insomnia, night terrors, kaz, memory loss, disorientation.  Skin: Patient denies any bruising, rashes, discoloration, pruritus, wounds, ulcers, decubiti, changes in the hair or nails  Hematopoietic: Patient denies history of spontaneous or excessive bleeding, epistaxis, hematuria, melena, fatigue, enlarged or tender lymph nodes, pallor, history of anemia.        Physical Exam:   Awake, A&O x3, affect normal, no acute distress  Ambulating with a limp due to knee pain  Knee ROM is limited due to pain (5-115)  Strength is 4/5 in the quad, hamstring and calf  Cap refill is normal, Sensation intact    Card:  RR, HD Stable  Pulm:  Regular breathing, no S.O.A  Abd:  Soft, NT, ND    Lab Results (last 24 hours)     ** No results found for the last 24 hours. **          Xr Knee 1 Or 2 View Left    Result Date: 8/5/2019  Narrative: PA AND LATERAL RADIOGRAPHIC VIEWS OF THE CHEST AND 2 RADIOGRAPHIC VIEWS OF THE LEFT KNEE  CLINICAL HISTORY: Preop left knee arthroplasty.  FINDINGS: Two radiographic views of the left knee demonstrate marked tricompartmental osteoarthritic change with joint space narrowing, subchondral sclerosis, and marginal osteophyte formation. There is some increased density in the region of the suprapatellar bursa suggesting a joint effusion. Otherwise, no acute abnormality seen within the left knee.  PA and lateral radiographic views of the chest demonstrate mild increase in the lung markings in the left base compatible with either mild chronic interstitial changes or mild left basilar atelectatic change. The right lung is clear of acute infiltrates. The cardiomediastinal silhouette is within normal limits.  Osseous structures are incidentally notable for degenerative phenomena within the thoracic spine.  This report was finalized on 8/5/2019 8:37 AM by Dr. John Carranza M.D.      Xr Chest Pa & Lateral    Result Date: 8/5/2019  Narrative: PA AND LATERAL RADIOGRAPHIC VIEWS OF THE CHEST AND 2 RADIOGRAPHIC VIEWS OF THE LEFT KNEE  CLINICAL HISTORY: Preop left knee arthroplasty.  FINDINGS: Two radiographic views of the left knee demonstrate marked tricompartmental osteoarthritic change with joint space narrowing, subchondral sclerosis, and marginal osteophyte formation. There is some increased density in the region of the suprapatellar bursa suggesting a joint effusion. Otherwise, no acute abnormality seen within the left knee.  PA and lateral radiographic views of the chest demonstrate mild increase in the lung markings in the left base compatible with either mild chronic interstitial changes or mild left basilar atelectatic change. The right lung is clear of acute infiltrates. The cardiomediastinal silhouette is within normal limits. Osseous structures are incidentally notable for degenerative phenomena within the thoracic spine.  This report was finalized on 8/5/2019 8:37 AM by Dr. John Carranza M.D.          Assessment:  End-stage Primary Left Knee Osteoarthritis    Plan:  Patient's pain is becoming disabling, despite extensive conservative treatment.  Radiographs reveal end-stage degenerative changes.  The risks of surgery, including, but not limited to, heart attack, stroke, dying, DVT, nerve injury, vascular injury, arthrofibrosis and infection were discussed.  The alternatives and benefits were also discussed.  All questions answered and the patient wishes to proceed with left total knee arthroplasty.    Estevan Esquivel MD  8/15/2019    CC: Andreia Schaefer MD, Estevan Esquivel MD

## 2019-08-15 NOTE — ANESTHESIA POSTPROCEDURE EVALUATION
Patient: Ashley Romero    Procedure Summary     Date:  08/15/19 Room / Location:  [facility] OR [address] [place] [address] MAIN OR    Anesthesia Start:  0708 Anesthesia Stop:  0921    Procedure:  TOTAL KNEE ARTHROPLASTY (Left Knee) Diagnosis:      Surgeon:  Estevan Esquivel MD Provider:  China Mcallister MD    Anesthesia Type:  general ASA Status:  3          Anesthesia Type: general  Last vitals  BP   98/59 (08/15/19 1142)   Temp   36.8 °C (98.3 °F) (08/15/19 1142)   Pulse   63 (08/15/19 1142)   Resp   16 (08/15/19 1142)     SpO2   96 % (08/15/19 1142)     Post Anesthesia Care and Evaluation    Patient location during evaluation: PACU  Patient participation: complete - patient participated  Level of consciousness: awake and alert  Pain management: adequate  Airway patency: patent  Anesthetic complications: No anesthetic complications    Cardiovascular status: acceptable  Respiratory status: acceptable  Hydration status: acceptable    Comments: ---------------------------               08/15/19                      1142         ---------------------------   BP:           98/59         Pulse:         63           Resp:          16           Temp:   36.8 °C (98.3 °F)   SpO2:          96%         ---------------------------

## 2019-08-15 NOTE — ANESTHESIA PREPROCEDURE EVALUATION
Anesthesia Evaluation     Patient summary reviewed and Nursing notes reviewed   no history of anesthetic complications:  NPO Solid Status: > 8 hours  NPO Liquid Status: > 2 hours           Airway   Mallampati: II  TM distance: >3 FB  Neck ROM: full  No difficulty expected  Dental          Pulmonary - normal exam    breath sounds clear to auscultation  (+) sleep apnea on CPAP,     ROS comment: Cough, non-productive, afebrile  Cardiovascular - normal exam    ECG reviewed  Rhythm: regular  Rate: normal    (+) hypertension 2 medications or greater, PVD, hyperlipidemia,       Neuro/Psych  (+) syncope,     GI/Hepatic/Renal/Endo    (+) morbid obesity, GERD,      Musculoskeletal     Abdominal   (+) obese,    Substance History - negative use     OB/GYN negative ob/gyn ROS         Other   (+) arthritis                     Anesthesia Plan    ASA 3     general   (Adductor canal block)  intravenous induction   Anesthetic plan, all risks, benefits, and alternatives have been provided, discussed and informed consent has been obtained with: patient.

## 2019-08-15 NOTE — ANESTHESIA PROCEDURE NOTES
Peripheral Block    Pre-sedation assessment completed: 8/15/2019 6:38 AM    Patient reassessed immediately prior to procedure    Patient location during procedure: holding area  Start time: 8/15/2019 6:38 AM  Stop time: 8/15/2019 6:50 AM  Reason for block: at surgeon's request and post-op pain management  Performed by  Anesthesiologist: China Mcallister MD  Preanesthetic Checklist  Completed: patient identified, site marked, pre-op evaluation, timeout performed, IV checked, risks and benefits discussed and monitors and equipment checked  Prep:  Pt Position: supine  Sterile barriers:cap, gloves and sterile barriers  Prep: ChloraPrep  Patient monitoring: blood pressure monitoring, continuous pulse oximetry and EKG  Procedure  Sedation:yes  Performed under: PNB  Guidance:ultrasound guided  ULTRASOUND INTERPRETATION.  Using ultrasound guidance a 20 G gauge needle was placed in close proximity to the femoral nerve, at which point, under ultrasound guidance anesthetic was injected in the area of the nerve and spread of the anesthesia was seen on ultrasound in close proximity thereto.  There were no abnormalities seen on ultrasound; a digital image was taken; and the patient tolerated the procedure with no complications. Images:still images obtained, printed/placed on chart    Laterality:left  Block Type:adductor canal block  Injection Technique:single-shot  Needle Type:echogenic  Needle Gauge:20 G      Medications Used: ropivacaine (NAROPIN) 0.5 % injection, 20 mL

## 2019-08-15 NOTE — PROGRESS NOTES
Discharge Planning Assessment  [facility]     Patient Name: Ashley Romero  MRN: [id number]  Today's Date: 8/15/2019    Admit Date: 8/15/2019    Discharge Needs Assessment     Row Name 08/15/19 1425       Living Environment    Lives With  spouse    Current Living Arrangements  home/apartment/condo    Primary Care Provided by  self    Provides Primary Care For  no one    Family Caregiver if Needed  spouse    Quality of Family Relationships  helpful;involved    Able to Return to Prior Arrangements  yes       Resource/Environmental Concerns    Resource/Environmental Concerns  none       Transition Planning    Patient/Family Anticipates Transition to  home with family    Patient/Family Anticipated Services at Transition  home health care    Transportation Anticipated  family or friend will provide       Discharge Needs Assessment    Concerns to be Addressed  discharge planning    Equipment Currently Used at Home  none    Equipment Needed After Discharge  none    Discharge Facility/Level of Care Needs  home with home health    Offered/Gave Vendor List  yes    Discharge Coordination/Progress  home with [facility]        Discharge Plan     Row Name 08/15/19 1428       Plan    Plan  home with [facility]    Patient/Family in Agreement with Plan  yes    Plan Comments  Met with pt and spouse bedside. Confirmed facesheet correct. Verfied Pre-screen. Pt reports she plans home with [facility], referral to [facility]. CCP to follow for additional needs. MICKY CopelandW        Destination      No service coordination in this encounter.      Durable Medical Equipment      No service coordination in this encounter.      Dialysis/Infusion      No service coordination in this encounter.      Home Medical Care      Service Provider Request Status Selected Services Address Phone Number Fax Number    [facility] Accepted N/A [id number], [facility] [address] [phone] --      Therapy      No service coordination in  this encounter.      Community Resources      No service coordination in this encounter.          Demographic Summary     Row Name 08/15/19 1425       General Information    Admission Type  inpatient    Arrived From  home    Reason for Consult  discharge planning    Preferred Language  English     Used During This Interaction  no       Contact Information    Permission Granted to Share Info With  facility ;family/designee        Functional Status     Row Name 08/15/19 1425       Functional Status    Usual Activity Tolerance  good    Current Activity Tolerance  moderate       Functional Status, IADL    Medications  independent    Meal Preparation  independent    Housekeeping  independent    Laundry  independent    Shopping  independent       Mental Status    General Appearance WDL  WDL       Mental Status Summary    Recent Changes in Mental Status/Cognitive Functioning  no changes        Psychosocial    No documentation.       Abuse/Neglect    No documentation.       Legal    No documentation.       Substance Abuse    No documentation.       Patient Forms    No documentation.           Katherine Navarro

## 2019-08-16 LAB
ANION GAP SERPL CALCULATED.3IONS-SCNC: 13.1 MMOL/L (ref 5–15)
BUN BLD-MCNC: 15 MG/DL (ref 8–23)
BUN/CREAT SERPL: 20.5 (ref 7–25)
CALCIUM SPEC-SCNC: 8 MG/DL (ref 8.6–10.5)
CHLORIDE SERPL-SCNC: 97 MMOL/L (ref 98–107)
CO2 SERPL-SCNC: 25.9 MMOL/L (ref 22–29)
CREAT BLD-MCNC: 0.73 MG/DL (ref 0.57–1)
DEPRECATED RDW RBC AUTO: 45.3 FL (ref 37–54)
ERYTHROCYTE [DISTWIDTH] IN BLOOD BY AUTOMATED COUNT: 14.2 % (ref 12.3–15.4)
GFR SERPL CREATININE-BSD FRML MDRD: 81 ML/MIN/1.73
GLUCOSE BLD-MCNC: 114 MG/DL (ref 65–99)
HCT VFR BLD AUTO: 30.9 % (ref 34–46.6)
HGB BLD-MCNC: 9.8 G/DL (ref 12–15.9)
MCH RBC QN AUTO: 27.6 PG (ref 26.6–33)
MCHC RBC AUTO-ENTMCNC: 31.7 G/DL (ref 31.5–35.7)
MCV RBC AUTO: 87 FL (ref 79–97)
PLATELET # BLD AUTO: 196 10*3/MM3 (ref 140–450)
PMV BLD AUTO: 10.1 FL (ref 6–12)
POTASSIUM BLD-SCNC: 3 MMOL/L (ref 3.5–5.2)
RBC # BLD AUTO: 3.55 10*6/MM3 (ref 3.77–5.28)
SODIUM BLD-SCNC: 136 MMOL/L (ref 136–145)
WBC NRBC COR # BLD: 4.75 10*3/MM3 (ref 3.4–10.8)

## 2019-08-16 PROCEDURE — 94799 UNLISTED PULMONARY SVC/PX: CPT

## 2019-08-16 PROCEDURE — 85027 COMPLETE CBC AUTOMATED: CPT | Performed by: ORTHOPAEDIC SURGERY

## 2019-08-16 PROCEDURE — 97110 THERAPEUTIC EXERCISES: CPT

## 2019-08-16 PROCEDURE — 80048 BASIC METABOLIC PNL TOTAL CA: CPT | Performed by: ORTHOPAEDIC SURGERY

## 2019-08-16 PROCEDURE — 97150 GROUP THERAPEUTIC PROCEDURES: CPT

## 2019-08-16 RX ORDER — OXYCODONE HYDROCHLORIDE AND ACETAMINOPHEN 5; 325 MG/1; MG/1
1-2 TABLET ORAL EVERY 4 HOURS PRN
Qty: 84 TABLET | Refills: 0 | Status: SHIPPED | OUTPATIENT
Start: 2019-08-16 | End: 2019-08-16 | Stop reason: HOSPADM

## 2019-08-16 RX ORDER — PSEUDOEPHEDRINE HCL 30 MG
100 TABLET ORAL 2 TIMES DAILY PRN
Qty: 30 EACH | Refills: 1 | Status: SHIPPED | OUTPATIENT
Start: 2019-08-16 | End: 2020-03-11

## 2019-08-16 RX ORDER — POTASSIUM CHLORIDE 750 MG/1
20 CAPSULE, EXTENDED RELEASE ORAL 2 TIMES DAILY WITH MEALS
Status: DISCONTINUED | OUTPATIENT
Start: 2019-08-16 | End: 2019-08-17 | Stop reason: HOSPADM

## 2019-08-16 RX ORDER — ASPIRIN 81 MG/1
81 TABLET ORAL 2 TIMES DAILY WITH MEALS
Qty: 60 TABLET | Refills: 0 | Status: SHIPPED | OUTPATIENT
Start: 2019-08-16

## 2019-08-16 RX ADMIN — ASPIRIN 81 MG: 81 TABLET, COATED ORAL at 17:32

## 2019-08-16 RX ADMIN — MUPIROCIN: 20 OINTMENT TOPICAL at 09:27

## 2019-08-16 RX ADMIN — METOPROLOL SUCCINATE 25 MG: 25 TABLET, FILM COATED, EXTENDED RELEASE ORAL at 09:27

## 2019-08-16 RX ADMIN — CLINDAMYCIN PHOSPHATE 900 MG: 900 INJECTION, SOLUTION INTRAVENOUS at 00:55

## 2019-08-16 RX ADMIN — OXYCODONE HYDROCHLORIDE AND ACETAMINOPHEN 2 TABLET: 10; 325 TABLET ORAL at 09:28

## 2019-08-16 RX ADMIN — AMLODIPINE BESYLATE 5 MG: 5 TABLET ORAL at 09:27

## 2019-08-16 RX ADMIN — POTASSIUM CHLORIDE 20 MEQ: 750 CAPSULE, EXTENDED RELEASE ORAL at 09:44

## 2019-08-16 RX ADMIN — MUPIROCIN 1 APPLICATION: 20 OINTMENT TOPICAL at 21:34

## 2019-08-16 RX ADMIN — OXYCODONE HYDROCHLORIDE AND ACETAMINOPHEN 1 TABLET: 10; 325 TABLET ORAL at 03:29

## 2019-08-16 RX ADMIN — OXYCODONE HYDROCHLORIDE AND ACETAMINOPHEN 2 TABLET: 10; 325 TABLET ORAL at 21:34

## 2019-08-16 RX ADMIN — OXYCODONE HYDROCHLORIDE AND ACETAMINOPHEN 2 TABLET: 10; 325 TABLET ORAL at 17:29

## 2019-08-16 RX ADMIN — PAROXETINE 20 MG: 20 TABLET, FILM COATED ORAL at 09:28

## 2019-08-16 RX ADMIN — ATORVASTATIN CALCIUM 40 MG: 20 TABLET, FILM COATED ORAL at 09:28

## 2019-08-16 RX ADMIN — ASPIRIN 81 MG: 81 TABLET, COATED ORAL at 09:27

## 2019-08-16 RX ADMIN — POTASSIUM CHLORIDE 20 MEQ: 750 CAPSULE, EXTENDED RELEASE ORAL at 17:32

## 2019-08-16 RX ADMIN — OXYCODONE HYDROCHLORIDE AND ACETAMINOPHEN 2 TABLET: 10; 325 TABLET ORAL at 13:31

## 2019-08-16 RX ADMIN — PANTOPRAZOLE SODIUM 40 MG: 40 TABLET, DELAYED RELEASE ORAL at 06:36

## 2019-08-16 RX ADMIN — LOSARTAN POTASSIUM 100 MG: 100 TABLET, FILM COATED ORAL at 09:27

## 2019-08-16 RX ADMIN — SODIUM CHLORIDE, PRESERVATIVE FREE 3 ML: 5 INJECTION INTRAVENOUS at 21:34

## 2019-08-16 NOTE — PROGRESS NOTES
"  Ortho POD 1    Patients Name:  Ashley Romero  YOB: 1955  Medical Records Number:  [id number]    No complaints except pain    /62 (BP Location: Right arm, Patient Position: Lying)   Pulse 84   Temp 100.1 °F (37.8 °C) (Oral)   Resp 18   Ht 154.9 cm (60.98\")   Wt 96.8 kg (213 lb 6.5 oz)   SpO2 94%   BMI 40.34 kg/m²     LLE:  NVI, calf nontender, Sensation intact  No signs of DVT    Incision: clean, no infection    Lab Results (last 24 hours)     Procedure Component Value Units Date/Time    Basic Metabolic Panel [[id number]]  (Abnormal) Collected:  08/16/19 0300    Specimen:  Blood Updated:  08/16/19 0416     Glucose 114 mg/dL      BUN 15 mg/dL      Creatinine 0.73 mg/dL      Sodium 136 mmol/L      Potassium 3.0 mmol/L      Chloride 97 mmol/L      CO2 25.9 mmol/L      Calcium 8.0 mg/dL      eGFR Non African Amer 81 mL/min/1.73      BUN/Creatinine Ratio 20.5     Anion Gap 13.1 mmol/L     Narrative:       GFR Normal >60  Chronic Kidney Disease <60  Kidney Failure <15    CBC (No Diff) [[id number]]  (Abnormal) Collected:  08/16/19 0300    Specimen:  Blood Updated:  08/16/19 0354     WBC 4.75 10*3/mm3      RBC 3.55 10*6/mm3      Hemoglobin 9.8 g/dL      Hematocrit 30.9 %      MCV 87.0 fL      MCH 27.6 pg      MCHC 31.7 g/dL      RDW 14.2 %      RDW-SD 45.3 fl      MPV 10.1 fL      Platelets 196 10*3/mm3           Xr Knee 1 Or 2 View Left    Result Date: 8/15/2019  Narrative: LEFT KNEE: AP, LATERAL  HISTORY: Left knee arthroplasty.  FINDINGS: There has been left total knee arthroplasty and patellar resurfacing and the components appear in good position. Recent surgical changes include overlying surgical skin staples, soft tissue gas, and a surgical drain.  This report was finalized on 8/15/2019 10:02 AM by Dr. Reji Martin M.D.      Xr Knee 1 Or 2 View Left    Result Date: 8/5/2019  Narrative: PA AND LATERAL RADIOGRAPHIC VIEWS OF THE CHEST AND 2 RADIOGRAPHIC VIEWS OF THE LEFT KNEE  CLINICAL " HISTORY: Preop left knee arthroplasty.  FINDINGS: Two radiographic views of the left knee demonstrate marked tricompartmental osteoarthritic change with joint space narrowing, subchondral sclerosis, and marginal osteophyte formation. There is some increased density in the region of the suprapatellar bursa suggesting a joint effusion. Otherwise, no acute abnormality seen within the left knee.  PA and lateral radiographic views of the chest demonstrate mild increase in the lung markings in the left base compatible with either mild chronic interstitial changes or mild left basilar atelectatic change. The right lung is clear of acute infiltrates. The cardiomediastinal silhouette is within normal limits. Osseous structures are incidentally notable for degenerative phenomena within the thoracic spine.  This report was finalized on 8/5/2019 8:37 AM by Dr. John Carranza M.D.      Xr Chest Pa & Lateral    Result Date: 8/5/2019  Narrative: PA AND LATERAL RADIOGRAPHIC VIEWS OF THE CHEST AND 2 RADIOGRAPHIC VIEWS OF THE LEFT KNEE  CLINICAL HISTORY: Preop left knee arthroplasty.  FINDINGS: Two radiographic views of the left knee demonstrate marked tricompartmental osteoarthritic change with joint space narrowing, subchondral sclerosis, and marginal osteophyte formation. There is some increased density in the region of the suprapatellar bursa suggesting a joint effusion. Otherwise, no acute abnormality seen within the left knee.  PA and lateral radiographic views of the chest demonstrate mild increase in the lung markings in the left base compatible with either mild chronic interstitial changes or mild left basilar atelectatic change. The right lung is clear of acute infiltrates. The cardiomediastinal silhouette is within normal limits. Osseous structures are incidentally notable for degenerative phenomena within the thoracic spine.  This report was finalized on 8/5/2019 8:37 AM by Dr. John Carranza M.D.        S/p Left TKA  WBAT  with walker  ASA for DVT prophylaxis  Home with home health after PT tomorrow, if comfortable and mobilizing safely    Estevan Esquivel MD  8/16/2019

## 2019-08-16 NOTE — PLAN OF CARE
Problem: Patient Care Overview  Goal: Plan of Care Review  Outcome: Ongoing (interventions implemented as appropriate)   08/16/19 0340   OTHER   Outcome Summary Pt has done well through the night. Working with PT post LTK replacement. Ambulates with walker use and 1 assist. Voiding adequately per BRP. Pain well controlled with PO pain meds. Dressing is CDI with HV in place. CPAP in use at bedtime. Education provided on HTN management with meds and BP monitoring, BP low but stable at this time and pt is asymptomatic. Also monitoring for elevated temp, last recheck was improved after IS use encouragement. Plans to d/c home with HH today. Will continue to monitor.    Coping/Psychosocial   Plan of Care Reviewed With patient   Plan of Care Review   Progress improving     Goal: Individualization and Mutuality  Outcome: Ongoing (interventions implemented as appropriate)    Goal: Discharge Needs Assessment  Outcome: Ongoing (interventions implemented as appropriate)   08/15/19 1425   Discharge Needs Assessment   Concerns to be Addressed discharge planning   Patient/Family Anticipates Transition to home with family   Patient/Family Anticipated Services at Transition home health care   Transportation Anticipated family or friend will provide   Equipment Needed After Discharge none   Discharge Facility/Level of Care Needs home with home health   Offered/Gave Vendor List yes   [facility]    Disability   Equipment Currently Used at Home none     Goal: Interprofessional Rounds/Family Conf  Outcome: Ongoing (interventions implemented as appropriate)   08/16/19 0340   Interdisciplinary Rounds/Family Conf   Participants nursing;patient       Problem: Fall Risk (Adult)  Goal: Absence of Fall  Outcome: Ongoing (interventions implemented as appropriate)   08/16/19 0340   Fall Risk (Adult)   Absence of Fall achieves outcome       Problem: Knee Arthroplasty (Total, Partial) (Adult)  Goal: Signs and  Symptoms of Listed Potential Problems Will be Absent, Minimized or Managed (Knee Arthroplasty)  Outcome: Ongoing (interventions implemented as appropriate)   08/16/19 0340   Goal/Outcome Evaluation   Problems Assessed (Knee Arthroplasty) all   Problems Present (Knee Arthroplasty) pain;range of motion decreased;situational response     Goal: Anesthesia/Sedation Recovery  Outcome: Ongoing (interventions implemented as appropriate)   08/16/19 0340   Goal/Outcome Evaluation   Anesthesia/Sedation Recovery progressing toward baseline

## 2019-08-16 NOTE — DISCHARGE SUMMARY
Orthopaedic Surgery Discharge Summary    Patient Name:  Ashley Romero  YOB: 1955  Medical Records Number:  [id number]    Date of Admission:  8/15/2019  Date of Discharge:  8/17/2019    Primary Discharge Diagnosis:  OA (osteoarthritis) of knee [M17.10]    Secondary Discharge Diagnosis:    Problem List Items Addressed This Visit     None          Procedures Performed:  Left Total Knee Arthroplasty      Hospital Course:    Ashley Romero is a 64 y.o.  female who underwent successful left tka on 8/15/2019.  Ashley Romero was started on Aspirin 81 mg po twice daily immediately post-operatively for DVT prophylaxis.  On post-op day 1 the patients dressing was changed and their incision was clean, with no signs of infection and their calf was soft, with no signs of DVT.  The patient progressed well with physical therapy and the patients hemoglobin remained stable. On post-operative day 2 the patient was felt ready for discharge.     Total Knee Joint Replacement Discharge Instructions:    I. ACTIVITIES:  1. Exercises:  ? Complete exercise program as taught by the hospital physical therapist 2 times per day  ? Exercise program will be advanced by the physical therapist  ? During the day be up ambulating every 2 hours (while awake) for short distances  ? Complete the ankle pump exercises at least 10 times per hour (while awake)  ? Elevate legs most of the day the first week post operatively and thereafter elevate legs when in bed and for at least 30 minutes during the day. Caution must be taken to avoid pillow placement under the bend of the knee as this can led to flexion contractures of the knee.  ? Use cold packs 20-30 minutes approximately 5 times per day. This should be done before and after completing your exercises and at any time you are experiencing pain/ stiffness in your operative extremity.      2. Activities of Daily Living:  ? No tub baths, hot tubs, or swimming pools for 4 weeks  ? May shower  and let water run over the incision on post-operative day #5 if no drainage. Do not scrub or rub the incision. Simply let the water run over the incision and pat dry.    II. Restrictions  ? Do not cross legs or kneel  ? Your surgeon will discuss with you when you will be able to drive again.  ? Weight bearing is as tolerated  ? First week stay inside on even terrain. May go up and down stairs one stair at a time utilizing the hand rail  ? After one week, you may venture outside.    III. Precautions:  ? Everyone that comes near you should wash their hands  ? No elective dental, genital-urinary, or colon procedures or surgical procedures for 12 weeks after surgery unless absolutely necessary.  ?  If dental work or surgical procedure is deemed absolutely necessary, you will need to contact your surgeon as you will need to take antibiotics 1 hour prior to any dental work (including teeth cleanings).  ? Please discuss with your surgeon prophylactic antibiotics as the length of time this intervention will be necessary for you varies with each patient’s health history and situation.  ? Avoid sick people. If you must be around someone who is ill, they should wear a mask.  ? Avoid visits to the Emergency Room or Urgent Care unless you are having a life threatening event.   ? If ordered stockings are to be placed on in the morning and removed at night. Monitor the stockings to ensure that any swelling is not causing the stockings to become too tight. In this case, remove stockings immediately.    IV. INCISION CARE:  ? Wash your hands prior to dressing changes  ? Change the dressing as needed to keep incision clean and dry. Utilize dry gauze and paper tape. Avoid touching the side of the gauze that goes against the incision with your hands.  ? No creams or ointments to the incision  ? May remove dressing once the incision is free of drainage  ? Do not touch or pick at the incision  ? Check incision every day and notify surgeon  immediately if any of the following signs or symptoms are noted:  o Increase in redness  o Increase in swelling around the incision and of the entire extremity  o Increase in pain  o Drainage oozing from the incision  o Pulling apart of the edges of the incision  o Increase in overall body temperature (greater than 100.5 degrees)  ? Your surgeon will instruct you regarding suture or staple removal    V. Medications:   1. Anticoagulants: You will be discharged on an anticoagulant. This is a prophylactic medication that helps prevent blood clots during your post-operative period. The type and length of dosage varies based on your individual needs, procedure performed, and surgeon’s preference.  ? While taking the anticoagulant, you should avoid taking any additional aspirin, ibuprofen (Advil or Motrin), Aleve (Naprosyn) or other non-steroidal anti-inflammatory medications.   ? Notify surgeon immediately if any zeynep bleeding is noted in the urine, stool, emesis, or from the nose or the incision. Blood in the stool will often appear as black rather than red. Blood in urine may appear as pink. Blood in emesis may appear as brown/black like coffee grounds.  ? You will need to apply pressure for longer periods of time to any cuts or abrasions to stop bleeding  ? Avoid alcohol while taking anticoagulants    2. Stool Softeners: You will be at greater risk of constipation after surgery due to being less mobile and the pain medications.   ? Take stool softeners as instructed by your surgeon while on pain medications. Over the counter Colace 100 mg 1-2 capsules twice daily.   ? If stools become too loose or too frequent, please decreases the dosage or stop the stool softener.  ? If constipation occurs despite use of stool softeners, you are to continue the stool softeners and add a laxative (Milk of Magnesia 1 ounce daily as needed)  ? Drink plenty of fluids, and eat fruits and vegetables during your recovery time    3. Pain  Medications utilized after surgery are narcotics and the law requires that the following information be given to all patients that are prescribed narcotics:  ? CLASSIFICATION: Pain medications are called Opioids and are narcotics  ? LEGALITIES: It is illegal to share narcotics with others and to drive within 24 hours of taking narcotics  ? POTENTIAL SIDE EFFECTS: Potential side effects of opioids include: nausea, vomiting, itching, dizziness, drowsiness, dry mouth, constipation, and difficulty urinating.  ? POTENTIAL ADVERSE EFFECTS:   o Opioid tolerance can develop with use of pain medications and this simply means that it requires more and more of the medication to control pain; however, this is seen more in patients that use opioids for longer periods of time.  o Opioid dependence can develop with use of Opioids and this simply means that to stop the medication can cause withdrawal symptoms; however, this is seen with patients that use Opioids for longer periods of time.  o Opioid addiction can develop with use of Opioids and the incidence of this is very unlikely in patients who take the medications as ordered and stop the medications as instructed.  o Opioid overdose can be dangerous, but is unlikely when the medication is taken as ordered and stopped when ordered. It is important not to mix opioids with alcohol or with and type of sedative such as Benadryl as this can lead to over sedation and respiratory difficulty.  ? DOSAGE:   o Pain medications will need to be taken consistently for the first week to decrease pain and promote adequate pain relief and participation in physical therapy.  o After the initial surgical pain begins to resolve, you may begin to decrease the pain medication. By the end of 6-8 weeks, you should be off of pain medications.  o Refills will not be given by the office during evening hours, on weekends, or after 6-8 weeks post-op.  o To seek refills on pain medications during the  initial 6 week post-operative period, you must call the office 48 hours in advance to request the refill. The office will then notify you when to  the prescription. DO NOT wait until you are out of the medication to request a refill.    V. FOLLOW-UP VISITS:  ? You will need to follow up in the office with your surgeon in 3 weeks. Please call this number [phone] to schedule this appointment.  If you have any concerns or suspected complications prior to your follow up visit, please call your surgeons office. Do not wait until your appointment time if you suspect complications. These will need to be addressed in the office promptly.      Discharge Medications:     1) Percocet 5/325 mg  1-2 po q 4-6 hours for pain control  2)  Enteric Coated Aspirin 81 mg po twice daily for 4 weeks, then once daily for 2 weeks.  3) Amoxicillin 1,000 mg twice daily for 10 days    Estevan Esquivel MD  8/16/2019    CC:Andreia Schaefer MD:Estevan Esquivel MD

## 2019-08-16 NOTE — THERAPY TREATMENT NOTE
Acute Care - Physical Therapy Treatment Note  [facility]     Patient Name: Ashley Romero  : 1955  MRN: [id number]  Today's Date: 2019             Admit Date: 8/15/2019    Visit Dx:  No diagnosis found.  Patient Active Problem List   Diagnosis   • OA (osteoarthritis) of knee   • Ascending aorta dilatation (CMS/HCC)   • Benign essential hypertension   • Hyperlipidemia   • Vasovagal syncope       Therapy Treatment    Rehabilitation Treatment Summary     Row Name 19 1030             Treatment Time/Intention    Discipline  physical therapy assistant  -      Document Type  therapy note (daily note)  -      Subjective Information  complains of;fatigue;pain;weakness;dizziness  -      Care Plan Review  patient/other agree to care plan  -      Comment  amb limited w/PT since pt just amb to BR prior to gym and is now dizzy  -      Existing Precautions/Restrictions  fall  -      Recorded by [] Vonda De Luna, [facility] 19      Row Name 19 103             Bed Mobility Assessment/Treatment    Supine-Sit Stevens (Bed Mobility)  not tested  -      Recorded by [] Vonda De Luna [facility] 19      [facility] Name 19 103             Sit-Stand Transfer    Sit-Stand Stevens (Transfers)  not tested  -      Recorded by [] Vonda De Luna, [facility] 19      Row Name 19 103             Gait/Stairs Assessment/Training    Stevens Level (Gait)  not tested  -      Comment (Gait/Stairs)  just amb w/nsg to BR prior to gym session and currently c/o dizziness  -      Recorded by [] Vonda De Luna [facility] 19      Row Name 19 103             General ROM    GENERAL ROM COMMENTS  -8-100  -      Recorded by [] Vonda De Luna [facility] 19      Row Name 19 103             Therapeutic Exercise    Comment (Therapeutic Exercise)  TKR protocol x 15 reps  -      Recorded by [] Vonda De Luna [facility] 19      [facility]  Name 08/16/19 1030             Positioning and Restraints    Pre-Treatment Position  sitting in chair/recliner  -JM      In Chair  reclined;call light within reach;encouraged to call for assist;exit alarm on  -      Recorded by [EVER] Vonda De Luna, ERNESTINE 08/16/19 1822      Row Name 08/16/19 1030             Pain Scale: Numbers Pre/Post-Treatment    Pain Scale: Numbers, Pretreatment  6/10  -JM      Pain Scale: Numbers, Post-Treatment  7/10  -JM      Pain Location - Side  Left  -JM      Pain Location  knee  -JM      Pain Intervention(s)  Medication (See MAR);Repositioned;Cold applied  -      Recorded by [EVER] Vonda De Luna PTA 08/16/19 1822      Row Name                Wound 08/15/19 0753 Left knee Incision    Wound - Properties Group Date first assessed: 08/15/19 [TL] Time first assessed: 0753 [TL] Side: Left [TL] Location: knee [TL] Primary Wound Type: Incision [TL] Recorded by:  [TL] Ilana Jacskon RN 08/15/19 0753      User Key  (r) = Recorded By, (t) = Taken By, (c) = Cosigned By    Initials Name Effective Dates Discipline    TL Ilana Jackson RN 06/16/16 -  Nurse    Vonda Wilder, ERNESTINE 03/07/18 -  PT          Wound 08/15/19 0753 Left knee Incision (Active)   Dressing Appearance dry;intact;no drainage 8/16/2019  4:41 PM   Closure PAVEL 8/16/2019  4:41 PM   Base dressing in place, unable to visualize 8/16/2019  4:41 PM   Drainage Amount none 8/16/2019  4:41 PM   Dressing Care, Wound border dressing 8/16/2019  9:27 AM           Physical Therapy Education     Title: PT OT SLP Therapies (Done)     Topic: Physical Therapy (Done)     Point: Mobility training (Done)     Learning Progress Summary           Patient Acceptance, E,TB,D, VU by EVER at 8/16/2019  6:23 PM    Acceptance, E, NR by AR at 8/15/2019  2:28 PM                   Point: Home exercise program (Done)     Learning Progress Summary           Patient Acceptance, E,TB,D, VU by EVER at 8/16/2019  6:23 PM    Acceptance, E, NR by  AR at 8/15/2019  2:28 PM                   Point: Body mechanics (Done)     Learning Progress Summary           Patient Acceptance, E,TB,D, VU by EVER at 8/16/2019  6:23 PM    Acceptance, E, NR by AR at 8/15/2019  2:28 PM                   Point: Precautions (Done)     Learning Progress Summary           Patient Acceptance, E,TB,D, VU by EVER at 8/16/2019  6:23 PM    Acceptance, E, NR by AR at 8/15/2019  2:28 PM                               User Key     Initials Effective Dates Name Provider Type [facility] 03/07/18 -  Vonda De Luna PTA Physical Therapy Assistant PT    AR 04/03/18 -  Dee Landaverde, PT Physical Therapist PT                PT Recommendation and Plan     Plan of Care Reviewed With: patient  Outcome Summary: pt fatigued and dizzy this am limiting amb at present, plans home SAT ; excellent knee flex this visit  Outcome Measures     Row Name 08/16/19 1200             How much help from another person do you currently need...    Turning from your back to your side while in flat bed without using bedrails?  3  -JM      Moving from lying on back to sitting on the side of a flat bed without bedrails?  3  -JM      Moving to and from a bed to a chair (including a wheelchair)?  3  -JM      Standing up from a chair using your arms (e.g., wheelchair, bedside chair)?  3  -JM      Climbing 3-5 steps with a railing?  2  -JM      To walk in hospital room?  3  -JM      AM-PAC 6 Clicks Score (PT)  17  -        User Key  (r) = Recorded By, (t) = Taken By, (c) = Cosigned By    Initials Name Provider Type     Vonda De Luna PTA Physical Therapy Assistant         Time Calculation:   PT Charges     Row Name 08/16/19 9494             Time Calculation    Start Time  1030  -      Stop Time  1120  -      Time Calculation (min)  50 min  -EVER      PT Received On  08/16/19  -EVER      PT - Next Appointment  08/16/19  -         Time Calculation- PT    Total Timed Code Minutes- PT  20 minute(s)  -        User  Key  (r) = Recorded By, (t) = Taken By, (c) = Cosigned By    Initials Name Provider Type    Vonda Wilder PTA Physical Therapy Assistant        Therapy Charges for Today     Code Description Service Date Service Provider Modifiers Qty    53302871720 HC PT THER PROC EA 15 MIN 8/16/2019 Vonda De Luna PTA GP 1    74941419014 HC PT THER PROC GROUP 8/16/2019 Vonda De Luna PTA GP 1          PT G-Codes  Outcome Measure Options: AM-PAC 6 Clicks Basic Mobility (PT)  AM-PAC 6 Clicks Score (PT): 17    Vonda De Luna PTA  8/16/2019

## 2019-08-16 NOTE — PLAN OF CARE
Problem: Patient Care Overview  Goal: Plan of Care Review  Outcome: Ongoing (interventions implemented as appropriate)   08/16/19 1806   OTHER   Outcome Summary Patient ambulating using walker and stand by assist. Vitals are stable, low grade temp, encouraged IS, and voiding function is intact. Pain is managed with po meds. Patient anticipates d/c home with hh tomorrow. Educated on bp monitoring and med managment.    Coping/Psychosocial   Plan of Care Reviewed With patient   Plan of Care Review   Progress improving       Problem: Fall Risk (Adult)  Goal: Absence of Fall  Outcome: Ongoing (interventions implemented as appropriate)   08/16/19 1806   Fall Risk (Adult)   Absence of Fall achieves outcome       Problem: Knee Arthroplasty (Total, Partial) (Adult)  Goal: Signs and Symptoms of Listed Potential Problems Will be Absent, Minimized or Managed (Knee Arthroplasty)  Outcome: Ongoing (interventions implemented as appropriate)   08/16/19 1806   Goal/Outcome Evaluation   Problems Assessed (Knee Arthroplasty) all   Problems Present (Knee Arthroplasty) pain;functional deficit;range of motion decreased     Goal: Anesthesia/Sedation Recovery  Outcome: Outcome(s) achieved Date Met: 08/16/19 08/16/19 1806   Goal/Outcome Evaluation   Anesthesia/Sedation Recovery recovered to baseline

## 2019-08-16 NOTE — THERAPY TREATMENT NOTE
Acute Care - Physical Therapy Treatment Note  [facility]     Patient Name: Ashley Romero  : 1955  MRN: [id number]  Today's Date: 2019             Admit Date: 8/15/2019    Visit Dx:  No diagnosis found.  Patient Active Problem List   Diagnosis   • OA (osteoarthritis) of knee   • Ascending aorta dilatation (CMS/HCC)   • Benign essential hypertension   • Hyperlipidemia   • Vasovagal syncope       Therapy Treatment    Rehabilitation Treatment Summary     Row Name 19 1400 19 1030          Treatment Time/Intention    Discipline  physical therapy assistant  -  physical therapy assistant  -     Document Type  therapy note (daily note)  -  therapy note (daily note)  -     Subjective Information  complains of;weakness;fatigue;pain  -  complains of;fatigue;pain;weakness;dizziness  -     Mode of Treatment  group therapy;physical therapy  -  group therapy;physical therapy  -2     Care Plan Review  patient/other agree to care plan  -  patient/other agree to care plan  -     Comment  --  amb limited w/PT since pt just amb to BR prior to gym and is now dizzy  -     Existing Precautions/Restrictions  fall  -  fall  -     Recorded by [] Vonda De Luna, \[facility]\"" 19 [] Vonda De Luna, \[facility]\"" 19  [2] Vonda De Luna, \[facility]\"" 19     Row Name 19 103             Bed Mobility Assessment/Treatment    Supine-Sit Hessel (Bed Mobility)  not tested  -      Recorded by [] Vonda De Luna, \[facility]\"" 19      Row Name 19 1400 19 103          Sit-Stand Transfer    Sit-Stand [place] (Transfers)  contact guard;verbal cues  -  not tested  -     Assistive Device (Sit-Stand Transfers)  walker, front-wheeled  -  --     Recorded by [] Vonda De Luna, \[facility]\"" 19 [] Vonda De Luna, \[facility]\"" 19     Row Name 19 1400 19 103          Gait/Stairs Assessment/Training    [place] Level (Gait)   contact guard;verbal cues  -JM  not tested  -JM     Assistive Device (Gait)  walker, front-wheeled  -JM  --     Distance in Feet (Gait)  50  -JM  --     Deviations/Abnormal Patterns (Gait)  antalgic;allen decreased  -JM  --     Bilateral Gait Deviations  forward flexed posture corrected w/cues  -JM  --     [place] Level (Stairs)  contact guard;verbal cues  -JM  --     Handrail Location (Stairs)  none  -JM  --     Number of Steps (Stairs)  2  -JM  --     Ascending Technique (Stairs)  step-to-step  -JM  --     Descending Technique (Stairs)  step-to-step  -JM  --     Comment (Gait/Stairs)  backward w/supported wx  -JM  just amb w/nsg to BR prior to gym session and currently c/o dizziness  -JM     Recorded by [] Vonda De Luna, [facility] 08/16/19 1828 [JM] Vonda De Luna [facility] 08/16/19 1822     Row Name 08/16/19 1030             General ROM    GENERAL ROM COMMENTS  -8-100  -JM      Recorded by [] Vonda De Luna, [facility] 08/16/19 1822      Row Name 08/16/19 1030             Therapeutic Exercise    Comment (Therapeutic Exercise)  TKR protocol x 15 reps  -JM      Recorded by [] Vonda De Luna [facility] 08/16/19 1822      Row Name 08/16/19 1030             Positioning and Restraints    Pre-Treatment Position  sitting in chair/recliner  -JM      In Chair  reclined;call light within reach;encouraged to call for assist;exit alarm on  -JM      Recorded by [JM] Vonda De Luna, [facility] 08/16/19 1822      Row Name 08/16/19 1400 08/16/19 1030          Pain Scale: Numbers Pre/Post-Treatment    Pain Scale: Numbers, Pretreatment  7/10  -JM  6/10  -JM     Pain Scale: Numbers, Post-Treatment  --  7/10  -JM     Pain Location - Side  Left  -JM  Left  -JM     Pain Location  knee  -JM  knee  -JM     Pain Intervention(s)  Medication (See MAR);Repositioned;Cold applied;Elevated  -JM  Medication (See MAR);Repositioned;Cold applied  -JM     Recorded by [JM] Vonda De Luna, [facility] 08/16/19 1828 [] Vonda De Luna, [facility] 08/16/19 1822     Row  Name                Wound 08/15/19 0753 Left knee Incision    Wound - Properties Group Date first assessed: 08/15/19 [TL] Time first assessed: 0753 [TL] Side: Left [TL] Location: knee [TL] Primary Wound Type: Incision [TL] Recorded by:  [TL] Ilana Jackson RN 08/15/19 0753      User Key  (r) = Recorded By, (t) = Taken By, (c) = Cosigned By    Initials Name Effective Dates Discipline    Ilana Solorio RN 06/16/16 -  Nurse    Vonda Wilder PTA 03/07/18 -  PT          Wound 08/15/19 0753 Left knee Incision (Active)   Dressing Appearance dry;intact;no drainage 8/16/2019  4:41 PM   Closure PAVEL 8/16/2019  4:41 PM   Base dressing in place, unable to visualize 8/16/2019  4:41 PM   Drainage Amount none 8/16/2019  4:41 PM   Dressing Care, Wound border dressing 8/16/2019  9:27 AM           Physical Therapy Education     Title: PT OT SLP Therapies (Done)     Topic: Physical Therapy (Done)     Point: Mobility training (Done)     Learning Progress Summary           Patient Acceptance, E,TB,D, VU by EVER at 8/16/2019  6:23 PM    Acceptance, E, NR by AR at 8/15/2019  2:28 PM                   Point: Home exercise program (Done)     Learning Progress Summary           Patient Acceptance, E,TB,D, VU by EVER at 8/16/2019  6:23 PM    Acceptance, E, NR by AR at 8/15/2019  2:28 PM                   Point: Body mechanics (Done)     Learning Progress Summary           Patient Acceptance, E,TB,D, VU by EVER at 8/16/2019  6:23 PM    Acceptance, E, NR by AR at 8/15/2019  2:28 PM                   Point: Precautions (Done)     Learning Progress Summary           Patient Acceptance, E,TB,D, VU by EVER at 8/16/2019  6:23 PM    Acceptance, E, NR by AR at 8/15/2019  2:28 PM                               User Key     Initials Effective Dates Name Provider Type Discipline    EVER 03/07/18 -  Vonda De Luna PTA Physical Therapy Assistant PT    AR 04/03/18 -  Dee Landaverde PT Physical Therapist PT                PT  Recommendation and Plan     Plan of Care Reviewed With: patient  Outcome Summary: pt fatigued and dizzy this am limiting amb at present, plans home SAT ; excellent knee flex this visit  Outcome Measures     Row Name 08/16/19 1200             How much help from another person do you currently need...    Turning from your back to your side while in flat bed without using bedrails?  3  -JM      Moving from lying on back to sitting on the side of a flat bed without bedrails?  3  -JM      Moving to and from a bed to a chair (including a wheelchair)?  3  -JM      Standing up from a chair using your arms (e.g., wheelchair, bedside chair)?  3  -JM      Climbing 3-5 steps with a railing?  2  -JM      To walk in hospital room?  3  -JM      AM-PAC 6 Clicks Score (PT)  17  -        User Key  (r) = Recorded By, (t) = Taken By, (c) = Cosigned By    Initials Name Provider Type    Vonda Wilder PTA Physical Therapy Assistant         Time Calculation:   PT Charges     Row Name 08/16/19 1828 08/16/19 1824          Time Calculation    Start Time  1400  -  1030  -     Stop Time  1448  -  1120  -     Time Calculation (min)  48 min  -JM  50 min  -     PT Received On  08/16/19  -  08/16/19  -     PT - Next Appointment  08/17/19  -  08/16/19  -        Time Calculation- PT    Total Timed Code Minutes- PT  23 minute(s)  -JM  20 minute(s)  -       User Key  (r) = Recorded By, (t) = Taken By, (c) = Cosigned By    Initials Name Provider Type    Vonda Wilder PTA Physical Therapy Assistant        Therapy Charges for Today     Code Description Service Date Service Provider Modifiers Qty    94719806749 HC PT THER PROC EA 15 MIN 8/16/2019 Vonda De Luna PTA GP 1    29634733450 HC PT THER PROC GROUP 8/16/2019 Vonda De Luna PTA GP 1    75497962887 HC PT THER PROC EA 15 MIN 8/16/2019 Vonda De Luna PTA GP 2    14429894112 HC PT THER PROC GROUP 8/16/2019 Vonda De Luna PTA GP 1          PT  G-Codes  Outcome Measure Options: -PAC 6 Clicks Basic Mobility (PT)  AM-[facility] 6 Clicks Score (PT): 17    Vonda De Luna, PTA  8/16/2019

## 2019-08-16 NOTE — PLAN OF CARE
Problem: Patient Care Overview  Goal: Plan of Care Review  Outcome: Ongoing (interventions implemented as appropriate)   08/16/19 4622   OTHER   Outcome Summary pt fatigued and dizzy this am limiting amb at present, plans home SAT ; excellent knee flex this visit   Coping/Psychosocial   Plan of Care Reviewed With patient

## 2019-08-17 VITALS
HEART RATE: 88 BPM | TEMPERATURE: 98.4 F | BODY MASS INDEX: 40.29 KG/M2 | SYSTOLIC BLOOD PRESSURE: 104 MMHG | RESPIRATION RATE: 18 BRPM | DIASTOLIC BLOOD PRESSURE: 63 MMHG | OXYGEN SATURATION: 95 % | WEIGHT: 213.41 LBS | HEIGHT: 61 IN

## 2019-08-17 LAB
ANION GAP SERPL CALCULATED.3IONS-SCNC: 11.2 MMOL/L (ref 5–15)
BUN BLD-MCNC: 7 MG/DL (ref 8–23)
BUN/CREAT SERPL: 10 (ref 7–25)
CALCIUM SPEC-SCNC: 8 MG/DL (ref 8.6–10.5)
CHLORIDE SERPL-SCNC: 98 MMOL/L (ref 98–107)
CO2 SERPL-SCNC: 26.8 MMOL/L (ref 22–29)
CREAT BLD-MCNC: 0.7 MG/DL (ref 0.57–1)
DEPRECATED RDW RBC AUTO: 45.7 FL (ref 37–54)
ERYTHROCYTE [DISTWIDTH] IN BLOOD BY AUTOMATED COUNT: 14.2 % (ref 12.3–15.4)
GFR SERPL CREATININE-BSD FRML MDRD: 85 ML/MIN/1.73
GLUCOSE BLD-MCNC: 115 MG/DL (ref 65–99)
HCT VFR BLD AUTO: 29.8 % (ref 34–46.6)
HGB BLD-MCNC: 9.4 G/DL (ref 12–15.9)
MCH RBC QN AUTO: 27.6 PG (ref 26.6–33)
MCHC RBC AUTO-ENTMCNC: 31.5 G/DL (ref 31.5–35.7)
MCV RBC AUTO: 87.6 FL (ref 79–97)
PLATELET # BLD AUTO: 179 10*3/MM3 (ref 140–450)
PMV BLD AUTO: 9.9 FL (ref 6–12)
POTASSIUM BLD-SCNC: 3.4 MMOL/L (ref 3.5–5.2)
RBC # BLD AUTO: 3.4 10*6/MM3 (ref 3.77–5.28)
SODIUM BLD-SCNC: 136 MMOL/L (ref 136–145)
WBC NRBC COR # BLD: 5.33 10*3/MM3 (ref 3.4–10.8)

## 2019-08-17 PROCEDURE — 97110 THERAPEUTIC EXERCISES: CPT

## 2019-08-17 PROCEDURE — 97150 GROUP THERAPEUTIC PROCEDURES: CPT

## 2019-08-17 PROCEDURE — 85027 COMPLETE CBC AUTOMATED: CPT | Performed by: ORTHOPAEDIC SURGERY

## 2019-08-17 PROCEDURE — 80048 BASIC METABOLIC PNL TOTAL CA: CPT | Performed by: ORTHOPAEDIC SURGERY

## 2019-08-17 RX ORDER — AMOXICILLIN 500 MG/1
1000 CAPSULE ORAL 2 TIMES DAILY
Qty: 40 CAPSULE | Refills: 0 | Status: SHIPPED | OUTPATIENT
Start: 2019-08-17 | End: 2019-09-10

## 2019-08-17 RX ADMIN — PAROXETINE 20 MG: 20 TABLET, FILM COATED ORAL at 09:04

## 2019-08-17 RX ADMIN — SODIUM CHLORIDE, PRESERVATIVE FREE 3 ML: 5 INJECTION INTRAVENOUS at 10:04

## 2019-08-17 RX ADMIN — ACETAMINOPHEN 325 MG: 325 TABLET, FILM COATED ORAL at 00:05

## 2019-08-17 RX ADMIN — ATORVASTATIN CALCIUM 40 MG: 20 TABLET, FILM COATED ORAL at 09:03

## 2019-08-17 RX ADMIN — OXYCODONE HYDROCHLORIDE AND ACETAMINOPHEN 2 TABLET: 10; 325 TABLET ORAL at 06:36

## 2019-08-17 RX ADMIN — POTASSIUM CHLORIDE 20 MEQ: 750 CAPSULE, EXTENDED RELEASE ORAL at 09:04

## 2019-08-17 RX ADMIN — LOSARTAN POTASSIUM 100 MG: 100 TABLET, FILM COATED ORAL at 09:04

## 2019-08-17 RX ADMIN — PANTOPRAZOLE SODIUM 40 MG: 40 TABLET, DELAYED RELEASE ORAL at 06:36

## 2019-08-17 RX ADMIN — OXYCODONE HYDROCHLORIDE AND ACETAMINOPHEN 2 TABLET: 10; 325 TABLET ORAL at 01:48

## 2019-08-17 RX ADMIN — ASPIRIN 81 MG: 81 TABLET, COATED ORAL at 09:04

## 2019-08-17 RX ADMIN — OXYCODONE HYDROCHLORIDE AND ACETAMINOPHEN 2 TABLET: 10; 325 TABLET ORAL at 10:08

## 2019-08-17 RX ADMIN — METOPROLOL SUCCINATE 25 MG: 25 TABLET, FILM COATED, EXTENDED RELEASE ORAL at 09:04

## 2019-08-17 RX ADMIN — FERROUS SULFATE TAB 325 MG (65 MG ELEMENTAL FE) 325 MG: 325 (65 FE) TAB at 09:03

## 2019-08-17 NOTE — THERAPY DISCHARGE NOTE
Patient Name: Ashley Romero  : 1955    MRN: [id number]                              Today's Date: 2019       Admit Date: 8/15/2019    Visit Dx: No diagnosis found.  Patient Active Problem List   Diagnosis   • OA (osteoarthritis) of knee   • Ascending aorta dilatation (CMS/HCC)   • Benign essential hypertension   • Hyperlipidemia   • Vasovagal syncope     Past Medical History:   Diagnosis Date   • Aortic dilatation (CMS/HCC)     ASCENDING   • Arthritis    • Owens esophagus    • Depression    • GERD (gastroesophageal reflux disease)    • History of ulcer disease    • Hyperlipidemia    • Hypertension    • Right knee pain    • Sleep apnea     CPAP     Past Surgical History:   Procedure Laterality Date   • COLONOSCOPY     • ENDOSCOPY     • TONSILLECTOMY     • TOTAL KNEE ARTHROPLASTY Right 3/14/2019    Procedure: RIGHT TOTAL KNEE ARTHROPLASTY;  Surgeon: Estevan Esquivel MD;  Location: [facility];  Service: Orthopedics   • TOTAL KNEE ARTHROPLASTY Right 2019   • TOTAL KNEE ARTHROPLASTY Left 8/15/2019    Procedure: TOTAL KNEE ARTHROPLASTY;  Surgeon: Estevan Esquivel MD;  Location: [facility];  Service: Orthopedics   • TUBAL ABDOMINAL LIGATION       General Information     Row Name 19 1128          PT Evaluation Time/Intention    Document Type  therapy note (daily note)  -MS     Mode of Treatment  physical therapy  -MS       User Key  (r) = Recorded By, (t) = Taken By, (c) = Cosigned By    Initials Name Provider Type    MS Daniel Franks, PT Physical Therapist        Mobility     Row Name 19 1129          Bed Mobility Assessment/Treatment    Comment (Bed Mobility)  Up in chair this AM.  -MS     Row Name 19 1129          Sit-Stand Transfer    Sit-Stand [place] (Transfers)  independent  -MS     Assistive Device (Sit-Stand Transfers)  walker, front-wheeled  -MS     Row Name 19 1129          Gait/Stairs Assessment/Training    [place] Level (Gait)  independent  -MS      Assistive Device (Gait)  walker, front-wheeled  -MS     Distance in Feet (Gait)  100 feet  -MS     Deviations/Abnormal Patterns (Gait)  antalgic  -MS       User Key  (r) = Recorded By, (t) = Taken By, (c) = Cosigned By    Initials Name Provider Type    Daniel Reeves, PT Physical Therapist        Obj/Interventions     Row Name 08/17/19 1129          General ROM    GENERAL ROM COMMENTS  Left Knee AROM (7, 90)  -MS     Row Name 08/17/19 1129          Therapeutic Exercise    Comment (Therapeutic Exercise)  Left TKR protocol x 25 reps completed  -MS       User Key  (r) = Recorded By, (t) = Taken By, (c) = Cosigned By    Initials Name Provider Type    Daniel Reeves, PT Physical Therapist        Goals/Plan    No documentation.       Clinical Impression     Row Name 08/17/19 1130          Pain Scale: Numbers Pre/Post-Treatment    Pain Scale: Numbers, Pretreatment  5/10  -MS     Pain Scale: Numbers, Post-Treatment  5/10  -MS     Pain Location - Side  Left  -MS     Pain Location  knee  -MS     Pain Intervention(s)  Medication (See MAR);Cold applied;Elevated;Rest  -MS     Row Name 08/17/19 1131 08/17/19 0830       Plan of Care Review    Plan of Care Reviewed With  patient  -MS  patient  -    Row Name 08/17/19 1130          Positioning and Restraints    Pre-Treatment Position  sitting in chair/recliner  -MS     Post Treatment Position  chair  -MS     In Chair  notified nsg;sitting;reclined;call light within reach;encouraged to call for assist  -MS       User Key  (r) = Recorded By, (t) = Taken By, (c) = Cosigned By    Initials Name Provider Type    Daniel Reeves, PT Physical Therapist     Kajal Duran, RN Registered Nurse        Outcome Measures     Row Name 08/17/19 1131          How much help from another person do you currently need...    Turning from your back to your side while in flat bed without using bedrails?  4  -MS     Moving from lying on back to sitting on the side of a flat bed  without bedrails?  4  -MS     Moving to and from a bed to a chair (including a wheelchair)?  4  -MS     Standing up from a chair using your arms (e.g., wheelchair, bedside chair)?  4  -MS     Climbing 3-5 steps with a railing?  3  -MS     To walk in hospital room?  4  -MS     AM-PAC 6 Clicks Score (PT)  23  -MS       User Key  (r) = Recorded By, (t) = Taken By, (c) = Cosigned By    Initials Name Provider Type    Daniel Reeves, PT Physical Therapist        Physical Therapy Education     Title: PT OT SLP Therapies (Done)     Topic: Physical Therapy (Done)     Point: Mobility training (Done)     Learning Progress Summary           Patient Acceptance, E,D, VU,DU by MS at 8/17/2019 11:30 AM    Acceptance, E,TB,D, VU by  at 8/16/2019  6:23 PM    Acceptance, E, NR by AR at 8/15/2019  2:28 PM                   Point: Home exercise program (Done)     Learning Progress Summary           Patient Acceptance, E,D, VU,DU by MS at 8/17/2019 11:30 AM    Acceptance, E,TB,D, VU by  at 8/16/2019  6:23 PM    Acceptance, E, NR by AR at 8/15/2019  2:28 PM                   Point: Body mechanics (Done)     Learning Progress Summary           Patient Acceptance, E,D, VU,DU by MS at 8/17/2019 11:30 AM    Acceptance, E,TB,D, VU by  at 8/16/2019  6:23 PM    Acceptance, E, NR by AR at 8/15/2019  2:28 PM                   Point: Precautions (Done)     Learning Progress Summary           Patient Acceptance, E,D, VU,DU by MS at 8/17/2019 11:30 AM    Acceptance, E,TB,D, VU by  at 8/16/2019  6:23 PM    Acceptance, E, NR by AR at 8/15/2019  2:28 PM                               User Key     Initials Effective Dates Name Provider Type [facility] 03/07/18 -  Vonda De Luna, PTA Physical Therapy Assistant PT    MS 04/03/18 -  Daniel Franks, PT Physical Therapist PT    AR 04/03/18 -  Dee Landaverde, PT Physical Therapist PT              PT Recommendation and Plan     Plan of Care Reviewed With: patient  Progress:  improving  Outcome Summary: Improved tolerance to functional activity this date with an increase in gait distance, progression of ther. ex. protocol, and completion of stair training.       Time Calculation:   PT Charges     Row Name 08/17/19 1131             Time Calculation    Start Time  1010  -MS      Stop Time  1045  -MS      Time Calculation (min)  35 min  -MS      PT Received On  08/17/19  -MS         Time Calculation- PT    Total Timed Code Minutes- PT  20 minute(s)  -MS        User Key  (r) = Recorded By, (t) = Taken By, (c) = Cosigned By    Initials Name Provider Type    Daniel Reeves, PT Physical Therapist        Therapy Charges for Today     Code Description Service Date Service Provider Modifiers Qty    85981285717 HC PT THER PROC EA 15 MIN 8/17/2019 Daniel Franks, PT GP 1    53650736513 HC PT THER PROC GROUP 8/17/2019 Daniel Franks, PT GP 1          PT G-Codes  Outcome Measure Options: AM-PAC 6 Clicks Basic Mobility (PT)  AM-PAC 6 Clicks Score (PT): 23    PT Discharge Summary  Reason for Discharge: Discharge from facility  Outcomes Achieved: Refer to plan of care for updates on goals achieved  Discharge Destination: Home with assist, Home with home health    Daniel Franks, PT  8/17/2019

## 2019-08-17 NOTE — PROGRESS NOTES
"  Ortho POD 2    Patient Name:  Ashley Romero  YOB: 1955  Medical Records Number:  [id number]    No complaints except pain    /54 (BP Location: Right arm, Patient Position: Lying)   Pulse 80   Temp (!) 101.1 °F (38.4 °C) (Oral)   Resp 16   Ht 154.9 cm (60.98\")   Wt 96.8 kg (213 lb 6.5 oz)   SpO2 94%   BMI 40.34 kg/m²     LLE:  NVI, calf nontender, sensation intact  No signs of DVT    Incision: clean, no infection    Lab Results (last 24 hours)     Procedure Component Value Units Date/Time    Basic Metabolic Panel [[id number]]  (Abnormal) Collected:  08/17/19 0324    Specimen:  Blood Updated:  08/17/19 0419     Glucose 115 mg/dL      BUN 7 mg/dL      Creatinine 0.70 mg/dL      Sodium 136 mmol/L      Potassium 3.4 mmol/L      Chloride 98 mmol/L      CO2 26.8 mmol/L      Calcium 8.0 mg/dL      eGFR Non African Amer 85 mL/min/1.73      BUN/Creatinine Ratio 10.0     Anion Gap 11.2 mmol/L     Narrative:       GFR Normal >60  Chronic Kidney Disease <60  Kidney Failure <15    CBC (No Diff) [[id number]]  (Abnormal) Collected:  08/17/19 0324    Specimen:  Blood Updated:  08/17/19 0350     WBC 5.33 10*3/mm3      RBC 3.40 10*6/mm3      Hemoglobin 9.4 g/dL      Hematocrit 29.8 %      MCV 87.6 fL      MCH 27.6 pg      MCHC 31.5 g/dL      RDW 14.2 %      RDW-SD 45.7 fl      MPV 9.9 fL      Platelets 179 10*3/mm3           S/p Left TKA  WBAT with walker  ASA for DVT prophylaxis  I certify that this patient requires inpatient admission for greater than 2 midnights due to fall risk with history of frequent falls  Home with home health today after PT, if comfortable and mobilizing safely    Estevan Esquivel MD  8/17/2019  "

## 2019-08-17 NOTE — PLAN OF CARE
Problem: Patient Care Overview  Goal: Plan of Care Review   08/17/19 1131   OTHER   Outcome Summary Improved tolerance to functional activity this date with an increase in gait distance, progression of ther. ex. protocol, and completion of stair training.    Coping/Psychosocial   Plan of Care Reviewed With patient   Plan of Care Review   Progress improving

## 2019-08-17 NOTE — PLAN OF CARE
Problem: Patient Care Overview  Goal: Plan of Care Review   08/16/19 1806 08/16/19 1822 08/17/19 0140   OTHER   Outcome Summary --  --  POD 2 with left TKA. Dressing CDI. Assist x 1 with walker and amb well. Pain controlled with current pain medication. Cpap in place. Plan is to d/c home with home health on 8/17/18.    Coping/Psychosocial   Plan of Care Reviewed With --  patient --    Plan of Care Review   Progress improving --  --

## 2019-09-03 DIAGNOSIS — I10 BENIGN ESSENTIAL HYPERTENSION: ICD-10-CM

## 2019-09-03 DIAGNOSIS — E78.2 MIXED HYPERLIPIDEMIA: Primary | ICD-10-CM

## 2019-09-03 LAB
ALBUMIN SERPL-MCNC: 4.8 G/DL (ref 3.5–5.2)
ALBUMIN/GLOB SERPL: 1.8 G/DL
ALP SERPL-CCNC: 104 U/L (ref 39–117)
ALT SERPL-CCNC: 14 U/L (ref 1–33)
AST SERPL-CCNC: 15 U/L (ref 1–32)
BILIRUB SERPL-MCNC: 0.6 MG/DL (ref 0.2–1.2)
BUN SERPL-MCNC: 15 MG/DL (ref 8–23)
BUN/CREAT SERPL: 18.8 (ref 7–25)
CALCIUM SERPL-MCNC: 9.9 MG/DL (ref 8.6–10.5)
CHLORIDE SERPL-SCNC: 99 MMOL/L (ref 98–107)
CHOLEST SERPL-MCNC: 165 MG/DL (ref 0–200)
CO2 SERPL-SCNC: 28.6 MMOL/L (ref 22–29)
CREAT SERPL-MCNC: 0.8 MG/DL (ref 0.57–1)
GLOBULIN SER CALC-MCNC: 2.7 GM/DL
GLUCOSE SERPL-MCNC: 102 MG/DL (ref 65–99)
HDLC SERPL-MCNC: 40 MG/DL (ref 40–60)
LDLC SERPL CALC-MCNC: 75 MG/DL (ref 0–100)
POTASSIUM SERPL-SCNC: 3.9 MMOL/L (ref 3.5–5.2)
PROT SERPL-MCNC: 7.5 G/DL (ref 6–8.5)
SODIUM SERPL-SCNC: 142 MMOL/L (ref 136–145)
TRIGL SERPL-MCNC: 248 MG/DL (ref 0–150)
VLDLC SERPL CALC-MCNC: 49.6 MG/DL

## 2019-09-09 ENCOUNTER — RESULTS ENCOUNTER (OUTPATIENT)
Dept: INTERNAL MEDICINE | Facility: CLINIC | Age: 64
End: 2019-09-09

## 2019-09-09 DIAGNOSIS — R73.9 HYPERGLYCEMIA: ICD-10-CM

## 2019-09-09 DIAGNOSIS — E78.2 MIXED HYPERLIPIDEMIA: ICD-10-CM

## 2019-09-10 ENCOUNTER — OFFICE VISIT (OUTPATIENT)
Dept: INTERNAL MEDICINE | Facility: CLINIC | Age: 64
End: 2019-09-10

## 2019-09-10 VITALS
HEIGHT: 61 IN | SYSTOLIC BLOOD PRESSURE: 122 MMHG | HEART RATE: 74 BPM | BODY MASS INDEX: 40.59 KG/M2 | WEIGHT: 215 LBS | DIASTOLIC BLOOD PRESSURE: 64 MMHG

## 2019-09-10 DIAGNOSIS — N39.41 URGE INCONTINENCE: Primary | ICD-10-CM

## 2019-09-10 DIAGNOSIS — I10 BENIGN ESSENTIAL HYPERTENSION: ICD-10-CM

## 2019-09-10 DIAGNOSIS — E78.2 ELEVATED TRIGLYCERIDES WITH HIGH CHOLESTEROL: ICD-10-CM

## 2019-09-10 DIAGNOSIS — R73.9 HYPERGLYCEMIA: ICD-10-CM

## 2019-09-10 PROBLEM — G47.30 SLEEP APNEA: Status: ACTIVE | Noted: 2019-09-10

## 2019-09-10 PROCEDURE — 99213 OFFICE O/P EST LOW 20 MIN: CPT | Performed by: INTERNAL MEDICINE

## 2019-09-10 NOTE — PATIENT INSTRUCTIONS
Kegel Exercises  Kegel exercises help strengthen the muscles that support the rectum, vagina, small intestine, bladder, and uterus. Doing Kegel exercises can help:  · Improve bladder and bowel control.  · Improve sexual response.  · Reduce problems and discomfort during pregnancy.  Kegel exercises involve squeezing your pelvic floor muscles, which are the same muscles you squeeze when you try to stop the flow of urine. The exercises can be done while sitting, standing, or lying down, but it is best to vary your position.  Exercises  1. Squeeze your pelvic floor muscles tight. You should feel a tight lift in your rectal area. If you are a female, you should also feel a tightness in your vaginal area. Keep your stomach, buttocks, and legs relaxed.  2. Hold the muscles tight for up to 10 seconds.  3. Relax your muscles.  Repeat this exercise 50 times a day or as many times as told by your health care provider. Continue to do this exercise for at least 4-6 weeks or for as long as told by your health care provider.  This information is not intended to replace advice given to you by your health care provider. Make sure you discuss any questions you have with your health care provider.  Document Released: 12/04/2013 Document Revised: 04/30/2018 Document Reviewed: 11/06/2016  [employer] Interactive Patient Education © 2019 [employer]

## 2019-09-10 NOTE — PROGRESS NOTES
Assessment and Plan  Ashley was seen today for hypertension and hyperlipidemia.    Diagnoses and all orders for this visit:    Urge incontinence  Comments:  Corina, doesn't want pills now.    Benign essential hypertension  -     Comprehensive Metabolic Panel; Future  -     Lipid Panel With LDL / HDL Ratio; Future  -     TSH; Future    Elevated triglycerides with high cholesterol  Comments:  admits to liberalizing her diet after surgery, plans to resume lower sugar intake.    Hyperglycemia  -     Hemoglobin A1c; Future      F/U and Patient Instructions    Return in about 6 months (around 3/10/2020) for Annual physical.  Patient Instructions   Kegel Exercises  Kegel exercises help strengthen the muscles that support the rectum, vagina, small intestine, bladder, and uterus. Doing Kegel exercises can help:  · Improve bladder and bowel control.  · Improve sexual response.  · Reduce problems and discomfort during pregnancy.  Kegel exercises involve squeezing your pelvic floor muscles, which are the same muscles you squeeze when you try to stop the flow of urine. The exercises can be done while sitting, standing, or lying down, but it is best to vary your position.  Exercises  1. Squeeze your pelvic floor muscles tight. You should feel a tight lift in your rectal area. If you are a female, you should also feel a tightness in your vaginal area. Keep your stomach, buttocks, and legs relaxed.  2. Hold the muscles tight for up to 10 seconds.  3. Relax your muscles.  Repeat this exercise 50 times a day or as many times as told by your health care provider. Continue to do this exercise for at least 4-6 weeks or for as long as told by your health care provider.  This information is not intended to replace advice given to you by your health care provider. Make sure you discuss any questions you have with your health care provider.  Document Released: 12/04/2013 Document Revised: 04/30/2018 Document Reviewed:  11/06/2016  [employer] Interactive Patient Education © 2019 [employer]        Subjective    Ashley Romero is a 64 y.o. female being seen in our office today for Hypertension and Hyperlipidemia     History of the Present Illness  HPI Just a few weeks s/p L TKA- she is doing PT, still requiring an occ pain pill but otherwise doing great.  She has also had B upper eyelids done.  Feels better.  Bladder leakage- exertion, cough/sneeze, some urge incontinence.  Worst is with urge- she is now wearing a panty liner.  Has gotten much worse over the past year.      Patient History        Significant Past History  The following portions of the patient's history were reviewed and updated as appropriate:PMHroutine: Social history , Allergies, Current Medications, Active Problem List and Health Maintenance              Social History  She  reports that she has never smoked. She has never used smokeless tobacco. She reports that she drinks alcohol. She reports that she does not use drugs.                         Review of Symptoms  Review of Systems   Constitution: Negative for weight gain and weight loss.   HENT: Negative.    Cardiovascular: Negative.    Genitourinary: Positive for bladder incontinence. Negative for dysuria.   Psychiatric/Behavioral: Negative.      Objective  Vital Signs         BP Readings from Last 1 Encounters:   09/10/19 122/64     Wt Readings from Last 3 Encounters:   09/10/19 97.5 kg (215 lb)   08/15/19 96.8 kg (213 lb 6.5 oz)   08/02/19 97.1 kg (214 lb 1.6 oz)   Body mass index is 40.62 kg/m².          Physical Exam   Physical Exam   Constitutional: No distress.   Cardiovascular: Normal rate and regular rhythm.   Musculoskeletal: She exhibits no edema.     Data Reviewed    Recent Results (from the past 2016 hour(s))   CBC (No Diff)    Collection Time: 08/02/19 12:30 PM   Result Value Ref Range    WBC 5.58 3.40 - 10.80 10*3/mm3    RBC 4.61 3.77 - 5.28 10*6/mm3    Hemoglobin 12.8 12.0 - 15.9 g/dL     Hematocrit 40.6 34.0 - 46.6 %    MCV 88.1 79.0 - 97.0 fL    MCH 27.8 26.6 - 33.0 pg    MCHC 31.5 31.5 - 35.7 g/dL    RDW 13.8 12.3 - 15.4 %    RDW-SD 44.2 37.0 - 54.0 fl    MPV 9.9 6.0 - 12.0 fL    Platelets 276 140 - 450 10*3/mm3   Comprehensive Metabolic Panel    Collection Time: 08/02/19 12:30 PM   Result Value Ref Range    Glucose 96 65 - 99 mg/dL    BUN 13 8 - 23 mg/dL    Creatinine 0.79 0.57 - 1.00 mg/dL    Sodium 140 136 - 145 mmol/L    Potassium 3.8 3.5 - 5.2 mmol/L    Chloride 100 98 - 107 mmol/L    CO2 28.4 22.0 - 29.0 mmol/L    Calcium 9.6 8.6 - 10.5 mg/dL    Total Protein 8.0 6.0 - 8.5 g/dL    Albumin 4.60 3.50 - 5.20 g/dL    ALT (SGPT) 22 1 - 33 U/L    AST (SGOT) 19 1 - 32 U/L    Alkaline Phosphatase 70 39 - 117 U/L    Total Bilirubin 0.5 0.2 - 1.2 mg/dL    eGFR Non African Amer 74 >60 mL/min/1.73    Globulin 3.4 gm/dL    A/G Ratio 1.4 g/dL    BUN/Creatinine Ratio 16.5 7.0 - 25.0    Anion Gap 11.6 5.0 - 15.0 mmol/L   Protime-INR    Collection Time: 08/02/19 12:30 PM   Result Value Ref Range    Protime 13.2 11.7 - 14.2 Seconds    INR 1.03 0.90 - 1.10   Urinalysis without microscopic (no culture) - Urine, Clean Catch    Collection Time: 08/02/19 12:30 PM   Result Value Ref Range    Color, UA Yellow Yellow, Straw    Appearance, UA Clear Clear    pH, UA 7.0 5.0 - 8.0    Specific Gravity, UA 1.017 1.005 - 1.030    Glucose, UA Negative Negative    Ketones, UA Negative Negative    Bilirubin, UA Negative Negative    Blood, UA Negative Negative    Protein, UA Negative Negative    Leuk Esterase, UA Trace (A) Negative    Nitrite, UA Negative Negative    Urobilinogen, UA 0.2 E.U./dL 0.2 - 1.0 E.U./dL   Urinalysis, Microscopic Only - Urine, Clean Catch    Collection Time: 08/02/19 12:30 PM   Result Value Ref Range    RBC, UA 0-2 None Seen, 0-2 /HPF    WBC, UA 0-2 None Seen, 0-2 /HPF    Bacteria, UA None Seen None Seen /HPF    Squamous Epithelial Cells, UA 0-2 None Seen, 0-2 /HPF    Hyaline Casts, UA 0-2 None Seen  /LPF    Methodology Automated Microscopy    Basic Metabolic Panel    Collection Time: 08/16/19  3:00 AM   Result Value Ref Range    Glucose 114 (H) 65 - 99 mg/dL    BUN 15 8 - 23 mg/dL    Creatinine 0.73 0.57 - 1.00 mg/dL    Sodium 136 136 - 145 mmol/L    Potassium 3.0 (L) 3.5 - 5.2 mmol/L    Chloride 97 (L) 98 - 107 mmol/L    CO2 25.9 22.0 - 29.0 mmol/L    Calcium 8.0 (L) 8.6 - 10.5 mg/dL    eGFR Non African Amer 81 >60 mL/min/1.73    BUN/Creatinine Ratio 20.5 7.0 - 25.0    Anion Gap 13.1 5.0 - 15.0 mmol/L   CBC (No Diff)    Collection Time: 08/16/19  3:00 AM   Result Value Ref Range    WBC 4.75 3.40 - 10.80 10*3/mm3    RBC 3.55 (L) 3.77 - 5.28 10*6/mm3    Hemoglobin 9.8 (L) 12.0 - 15.9 g/dL    Hematocrit 30.9 (L) 34.0 - 46.6 %    MCV 87.0 79.0 - 97.0 fL    MCH 27.6 26.6 - 33.0 pg    MCHC 31.7 31.5 - 35.7 g/dL    RDW 14.2 12.3 - 15.4 %    RDW-SD 45.3 37.0 - 54.0 fl    MPV 10.1 6.0 - 12.0 fL    Platelets 196 140 - 450 10*3/mm3   CBC (No Diff)    Collection Time: 08/17/19  3:24 AM   Result Value Ref Range    WBC 5.33 3.40 - 10.80 10*3/mm3    RBC 3.40 (L) 3.77 - 5.28 10*6/mm3    Hemoglobin 9.4 (L) 12.0 - 15.9 g/dL    Hematocrit 29.8 (L) 34.0 - 46.6 %    MCV 87.6 79.0 - 97.0 fL    MCH 27.6 26.6 - 33.0 pg    MCHC 31.5 31.5 - 35.7 g/dL    RDW 14.2 12.3 - 15.4 %    RDW-SD 45.7 37.0 - 54.0 fl    MPV 9.9 6.0 - 12.0 fL    Platelets 179 140 - 450 10*3/mm3   Basic Metabolic Panel    Collection Time: 08/17/19  3:24 AM   Result Value Ref Range    Glucose 115 (H) 65 - 99 mg/dL    BUN 7 (L) 8 - 23 mg/dL    Creatinine 0.70 0.57 - 1.00 mg/dL    Sodium 136 136 - 145 mmol/L    Potassium 3.4 (L) 3.5 - 5.2 mmol/L    Chloride 98 98 - 107 mmol/L    CO2 26.8 22.0 - 29.0 mmol/L    Calcium 8.0 (L) 8.6 - 10.5 mg/dL    eGFR Non African Amer 85 >60 mL/min/1.73    BUN/Creatinine Ratio 10.0 7.0 - 25.0    Anion Gap 11.2 5.0 - 15.0 mmol/L   Comprehensive metabolic panel    Collection Time: 09/03/19  8:46 AM   Result Value Ref Range    Glucose 102  (H) 65 - 99 mg/dL    BUN 15 8 - 23 mg/dL    Creatinine 0.80 0.57 - 1.00 mg/dL    eGFR Non African Am 72 >60 mL/min/1.73    eGFR African Am 88 >60 mL/min/1.73    BUN/Creatinine Ratio 18.8 7.0 - 25.0    Sodium 142 136 - 145 mmol/L    Potassium 3.9 3.5 - 5.2 mmol/L    Chloride 99 98 - 107 mmol/L    Total CO2 28.6 22.0 - 29.0 mmol/L    Calcium 9.9 8.6 - 10.5 mg/dL    Total Protein 7.5 6.0 - 8.5 g/dL    Albumin 4.80 3.50 - 5.20 g/dL    Globulin 2.7 gm/dL    A/G Ratio 1.8 g/dL    Total Bilirubin 0.6 0.2 - 1.2 mg/dL    Alkaline Phosphatase 104 39 - 117 U/L    AST (SGOT) 15 1 - 32 U/L    ALT (SGPT) 14 1 - 33 U/L   Lipid panel    Collection Time: 09/03/19  8:46 AM   Result Value Ref Range    Total Cholesterol 165 0 - 200 mg/dL    Triglycerides 248 (H) 0 - 150 mg/dL    HDL Cholesterol 40 40 - 60 mg/dL    VLDL Cholesterol 49.6 mg/dL    LDL Cholesterol  75 0 - 100 mg/dL

## 2019-10-04 RX ORDER — HYDROCHLOROTHIAZIDE 25 MG/1
25 TABLET ORAL DAILY
Qty: 90 TABLET | Refills: 0 | Status: SHIPPED | OUTPATIENT
Start: 2019-10-04 | End: 2019-12-08 | Stop reason: SDUPTHER

## 2019-11-05 RX ORDER — LOSARTAN POTASSIUM 100 MG/1
100 TABLET ORAL DAILY
Qty: 90 TABLET | Refills: 3 | Status: SHIPPED | OUTPATIENT
Start: 2019-11-05 | End: 2020-11-09

## 2019-12-09 RX ORDER — HYDROCHLOROTHIAZIDE 25 MG/1
25 TABLET ORAL DAILY
Qty: 90 TABLET | Refills: 2 | Status: SHIPPED | OUTPATIENT
Start: 2019-12-09 | End: 2020-10-05

## 2020-03-04 LAB
CHOLEST SERPL-MCNC: 191 MG/DL (ref 0–200)
HBA1C MFR BLD: 5.6 % (ref 4.8–5.6)
HDLC SERPL-MCNC: 44 MG/DL (ref 40–60)
LDLC SERPL CALC-MCNC: 115 MG/DL (ref 0–100)
LDLC/HDLC SERPL: 2.62 {RATIO}
TRIGL SERPL-MCNC: 159 MG/DL (ref 0–150)
TSH SERPL DL<=0.005 MIU/L-ACNC: 2.54 UIU/ML (ref 0.27–4.2)
VLDLC SERPL CALC-MCNC: 31.8 MG/DL

## 2020-03-08 ENCOUNTER — RESULTS ENCOUNTER (OUTPATIENT)
Dept: INTERNAL MEDICINE | Facility: CLINIC | Age: 65
End: 2020-03-08

## 2020-03-08 DIAGNOSIS — I10 BENIGN ESSENTIAL HYPERTENSION: ICD-10-CM

## 2020-03-10 ENCOUNTER — RESULTS ENCOUNTER (OUTPATIENT)
Dept: INTERNAL MEDICINE | Facility: CLINIC | Age: 65
End: 2020-03-10

## 2020-03-10 DIAGNOSIS — I10 BENIGN ESSENTIAL HYPERTENSION: ICD-10-CM

## 2020-03-10 DIAGNOSIS — R73.9 HYPERGLYCEMIA: ICD-10-CM

## 2020-03-11 ENCOUNTER — OFFICE VISIT (OUTPATIENT)
Dept: INTERNAL MEDICINE | Facility: CLINIC | Age: 65
End: 2020-03-11

## 2020-03-11 VITALS
HEART RATE: 74 BPM | DIASTOLIC BLOOD PRESSURE: 66 MMHG | HEIGHT: 61 IN | BODY MASS INDEX: 41.35 KG/M2 | SYSTOLIC BLOOD PRESSURE: 122 MMHG | WEIGHT: 219 LBS

## 2020-03-11 DIAGNOSIS — I77.810 ASCENDING AORTA DILATATION (HCC): ICD-10-CM

## 2020-03-11 DIAGNOSIS — G47.33 OBSTRUCTIVE SLEEP APNEA SYNDROME: ICD-10-CM

## 2020-03-11 DIAGNOSIS — I10 BENIGN ESSENTIAL HYPERTENSION: ICD-10-CM

## 2020-03-11 DIAGNOSIS — Z12.31 VISIT FOR SCREENING MAMMOGRAM: Primary | ICD-10-CM

## 2020-03-11 DIAGNOSIS — E78.49 OTHER HYPERLIPIDEMIA: ICD-10-CM

## 2020-03-11 DIAGNOSIS — Z00.00 PHYSICAL EXAM, ANNUAL: ICD-10-CM

## 2020-03-11 PROCEDURE — 99396 PREV VISIT EST AGE 40-64: CPT | Performed by: INTERNAL MEDICINE

## 2020-03-11 NOTE — PROGRESS NOTES
Subjective   Ashley Romero is a 64 y.o. female and is here for a comprehensive physical exam. The patient reports no problems.  She has been going to [employer] 2x per week doing a 30 minute circuit.  Has not lost weight, thinks she's trying ketogenic diet but not being successful.  She is doing well after knee surgery.  Uses CPAP nightly    Pt is here for with annual gyn exam and mammo order.           Social History     Socioeconomic History   • Marital status:      Spouse name: Not on file   • Number of children: Not on file   • Years of education: Not on file   • Highest education level: Not on file   Tobacco Use   • Smoking status: Never Smoker   • Smokeless tobacco: Never Used   Substance and Sexual Activity   • Alcohol use: Yes     Comment: OCCASIONALLY   • Drug use: No   • Sexual activity: Defer       Family History   Problem Relation Age of Onset   • Diabetes Mother    • Graves' disease Mother    • Heart disease Father    • Colonic polyp Maternal Grandfather    • Malig Hyperthermia Neg Hx           Past Medical History:   Diagnosis Date   • Arthritis    • Owens esophagus    • Benign essential hypertension    • Depression    • Family history of colonic polyps    • GERD (gastroesophageal reflux disease)    • History of ulcer disease    • Hyperglycemia    • Hyperlipidemia    • Nonalcoholic fatty liver disease    • Sleep apnea     CPAP   • Viral illness    • Yeast infection           Current Outpatient Medications:   •  acetaminophen (TYLENOL) 500 MG tablet, Take 1,000 mg by mouth Every 6 (Six) Hours As Needed for Mild Pain ., Disp: , Rfl:   •  amLODIPine (NORVASC) 5 MG tablet, Take 1 tablet by mouth Daily., Disp: 90 tablet, Rfl: 1  •  aspirin 81 MG EC tablet, Take 1 tablet by mouth 2 (Two) Times a Day With Meals., Disp: 60 tablet, Rfl: 0  •  atorvastatin (LIPITOR) 40 MG tablet, Take 40 mg by mouth Daily., Disp: , Rfl:   •  hydroCHLOROthiazide (HYDRODIURIL) 25 MG tablet, TAKE 1 TABLET BY MOUTH  "DAILY, Disp: 90 tablet, Rfl: 2  •  losartan (COZAAR) 100 MG tablet, TAKE 1 TABLET BY MOUTH DAILY, Disp: 90 tablet, Rfl: 3  •  metoprolol succinate XL (TOPROL-XL) 25 MG 24 hr tablet, Take 25 mg by mouth Daily., Disp: , Rfl:   •  omeprazole (priLOSEC) 40 MG capsule, Take 40 mg by mouth Daily., Disp: , Rfl:   •  PARoxetine (PAXIL) 20 MG tablet, Take 1 tablet by mouth Daily., Disp: 90 tablet, Rfl: 3    Review of Systems    Review of Systems   Constitutional: Negative for fatigue, unexpected weight gain and unexpected weight loss.   Eyes: Negative for visual disturbance.   Respiratory: Negative for shortness of breath.    Cardiovascular: Negative for chest pain and palpitations.   Gastrointestinal: Negative for abdominal pain and blood in stool.   Endocrine: Negative for cold intolerance.   Genitourinary: Negative for breast lump, decreased libido and menstrual problem.   Musculoskeletal: Negative for arthralgias and gait problem.   Neurological: Negative for memory problem.   Psychiatric/Behavioral: Negative for depressed mood.        There were no vitals filed for this visit.    Vitals:    03/11/20 0918   BP: 122/66   Pulse: 74   Weight: 99.3 kg (219 lb)   Height: 154.9 cm (61\")     Body mass index is 41.38 kg/m².  Wt Readings from Last 3 Encounters:   03/11/20 99.3 kg (219 lb)   09/10/19 97.5 kg (215 lb)   08/15/19 96.8 kg (213 lb 6.5 oz)      Objective     Physical Exam   Constitutional: She is oriented to person, place, and time. No distress.   HENT:   Mouth/Throat: No oropharyngeal exudate.   Eyes: No scleral icterus.   Neck: No thyromegaly present.   Cardiovascular: Normal rate, regular rhythm, normal heart sounds and intact distal pulses.   Pulmonary/Chest: Effort normal and breath sounds normal. Right breast exhibits no inverted nipple, no mass, no nipple discharge, no skin change and no tenderness. Left breast exhibits no inverted nipple, no mass, no nipple discharge, no skin change and no tenderness. "   Abdominal: Soft. Bowel sounds are normal.   Genitourinary: Vagina normal and uterus normal. No vaginal discharge found.   Musculoskeletal: Normal range of motion. She exhibits no tenderness.   Lymphadenopathy:     She has no cervical adenopathy.   Neurological: She is oriented to person, place, and time.   Skin: Skin is warm and dry.   Psychiatric: She has a normal mood and affect. Her behavior is normal. Judgment and thought content normal.       Procedures       Assessment/Plan         Ashley was seen today for annual exam.    Diagnoses and all orders for this visit:    Visit for screening mammogram  -     Mammo Screening Bilateral With CAD    Benign essential hypertension  Comments:  Doing great, no change.     Other hyperlipidemia  Comments:  at goal    Ascending aorta dilatation (CMS/HCC)  Comments:  followed annually    Obstructive sleep apnea syndrome  Comments:  compliant with CPAP    Physical exam, annual  Comments:  Discussed weight at length, rec avoid keto diet, calorie restriction.  Increase exercise to 150 minutes/week.  Recheck 6 months.        Return in about 6 months (around 9/11/2020) for Recheck.

## 2020-03-13 LAB
CONV .: NORMAL
CYTOLOGIST CVX/VAG CYTO: NORMAL
CYTOLOGY CVX/VAG DOC CYTO: NORMAL
CYTOLOGY CVX/VAG DOC THIN PREP: NORMAL
DX ICD CODE: NORMAL
HIV 1 & 2 AB SER-IMP: NORMAL
OTHER STN SPEC: NORMAL
STAT OF ADQ CVX/VAG CYTO-IMP: NORMAL

## 2020-03-17 RX ORDER — AMLODIPINE BESYLATE 5 MG/1
5 TABLET ORAL DAILY
Qty: 90 TABLET | Refills: 1 | Status: SHIPPED | OUTPATIENT
Start: 2020-03-17 | End: 2020-10-05

## 2020-03-18 RX ORDER — OMEPRAZOLE 40 MG/1
40 CAPSULE, DELAYED RELEASE ORAL DAILY
Qty: 90 CAPSULE | Refills: 1 | Status: SHIPPED | OUTPATIENT
Start: 2020-03-18 | End: 2020-12-08

## 2020-04-20 ENCOUNTER — APPOINTMENT (OUTPATIENT)
Dept: MAMMOGRAPHY | Facility: HOSPITAL | Age: 65
End: 2020-04-20

## 2020-05-01 RX ORDER — PAROXETINE HYDROCHLORIDE 20 MG/1
20 TABLET, FILM COATED ORAL DAILY
Qty: 90 TABLET | Refills: 3 | Status: SHIPPED | OUTPATIENT
Start: 2020-05-01 | End: 2021-05-10

## 2020-06-01 ENCOUNTER — HOSPITAL ENCOUNTER (OUTPATIENT)
Dept: MAMMOGRAPHY | Facility: HOSPITAL | Age: 65
Discharge: HOME | End: 2020-06-01
Admitting: INTERNAL MEDICINE

## 2020-06-01 PROCEDURE — 77067 SCR MAMMO BI INCL CAD: CPT

## 2020-09-09 ENCOUNTER — OFFICE VISIT (OUTPATIENT)
Dept: INTERNAL MEDICINE | Facility: CLINIC | Age: 65
End: 2020-09-09

## 2020-09-09 VITALS
TEMPERATURE: 97.5 F | DIASTOLIC BLOOD PRESSURE: 76 MMHG | WEIGHT: 236 LBS | HEART RATE: 78 BPM | HEIGHT: 61 IN | SYSTOLIC BLOOD PRESSURE: 134 MMHG | BODY MASS INDEX: 44.56 KG/M2

## 2020-09-09 DIAGNOSIS — I10 BENIGN ESSENTIAL HYPERTENSION: Primary | ICD-10-CM

## 2020-09-09 PROCEDURE — 99212 OFFICE O/P EST SF 10 MIN: CPT | Performed by: INTERNAL MEDICINE

## 2020-09-09 RX ORDER — MELOXICAM 15 MG/1
TABLET ORAL
COMMUNITY
Start: 2020-09-08 | End: 2021-09-22 | Stop reason: SDUPTHER

## 2020-09-09 NOTE — PROGRESS NOTES
Assessment and Plan  Ashley was seen today for hypertension and hyperlipidemia.    Diagnoses and all orders for this visit:    Benign essential hypertension  Comments:  Doing fine, no change.  Disucssed weight gain- feels motivated to get things under better control.      F/U and Patient Instructions    Return in about 6 months (around 3/9/2021) for Annual physical, Lab Before FUP.  There are no Patient Instructions on file for this visit.    Subjective    Ashley Romero is a 65 y.o. female being seen in our office today for Hypertension and Hyperlipidemia     History of the Present Illness  HPI  She has gained a lot of weight since pandemic.  Has no complaints.  She does not check her BP.        Patient History        Significant Past History  The following portions of the patient's history were reviewed and updated as appropriate:PMHroutine: Social history , Allergies, Current Medications, Active Problem List and Health Maintenance              Social History  She  reports that she has never smoked. She has never used smokeless tobacco. She reports that she drinks alcohol. She reports that she does not use drugs.                         Review of Symptoms  Review of Systems   Constitution: Negative.   Eyes: Negative.    Cardiovascular: Negative.    Musculoskeletal: Negative.    Psychiatric/Behavioral: Negative.      Objective  Vital Signs         BP Readings from Last 1 Encounters:   09/09/20 134/76     Wt Readings from Last 3 Encounters:   09/09/20 107 kg (236 lb)   03/11/20 99.3 kg (219 lb)   09/10/19 97.5 kg (215 lb)   Body mass index is 44.59 kg/m².          Physical Exam   Physical Exam   Constitutional: No distress.   Cardiovascular: Normal rate and regular rhythm.   Musculoskeletal: She exhibits no edema.     Data Reviewed    No results found for this or any previous visit (from the past 2016 hour(s)).

## 2020-10-05 RX ORDER — AMLODIPINE BESYLATE 5 MG/1
5 TABLET ORAL DAILY
Qty: 90 TABLET | Refills: 1 | Status: SHIPPED | OUTPATIENT
Start: 2020-10-05 | End: 2021-04-05

## 2020-10-05 RX ORDER — HYDROCHLOROTHIAZIDE 25 MG/1
25 TABLET ORAL DAILY
Qty: 90 TABLET | Refills: 2 | Status: SHIPPED | OUTPATIENT
Start: 2020-10-05 | End: 2021-07-06

## 2020-11-09 RX ORDER — LOSARTAN POTASSIUM 100 MG/1
100 TABLET ORAL DAILY
Qty: 90 TABLET | Refills: 3 | Status: SHIPPED | OUTPATIENT
Start: 2020-11-09 | End: 2021-10-29

## 2020-12-08 RX ORDER — OMEPRAZOLE 40 MG/1
40 CAPSULE, DELAYED RELEASE ORAL DAILY
Qty: 90 CAPSULE | Refills: 0 | Status: SHIPPED | OUTPATIENT
Start: 2020-12-08 | End: 2021-02-02 | Stop reason: SDUPTHER

## 2020-12-18 ENCOUNTER — TELEPHONE (OUTPATIENT)
Dept: GASTROENTEROLOGY | Facility: CLINIC | Age: 65
End: 2020-12-18

## 2020-12-18 NOTE — TELEPHONE ENCOUNTER
----- Message from FÉLIX Michelle sent at 12/8/2020 12:00 PM EST -----  The patient has a history of Owens's esophagus and was due to have her surveillance EGD March 2020, which did not get scheduled.  She was last seen in office on 5/29/2019.  I provided her 1 refill of her PPI medication, but she will in office follow-up and also needs to be scheduled for an EGD for surveillance of Owens's esophagus.  We cannot provide any additional refills of her medication until she has been seen again in office.  I am fine if you would like to add her onto my schedule and an opening in the next 3 weeks, and then we can get her on the schedule for her EGD.  That way we can keep her in refills and get her procedure scheduled.    Additionally, she has a history of fatty liver and is past due to have a hepatic function panel performed.  We can do this at an office visit.  I do have some upcoming dates still in December for available appointments.  Thank you. QUINTEN HEARD

## 2021-02-01 NOTE — PROGRESS NOTES
Chief Complaint   Patient presents with   • Follow-up     Owens's, GERD             History of Present Illness  65-year old female presents today for follow up. She has a history of GERD and Owens's esophagus as well as colon polyps. As long as she takes her omeprazole 40 mg daily symptoms are well controlled. She has a history of Owens's esophagus and is due for her surveillance EGD at this time.     She has a history of colon cancer and will be due for her next colonoscopy in 2022. She denies any change in bowel habits or rectal bleeding.     Review of Systems   Constitutional: Negative for appetite change, chills, diaphoresis, fatigue, fever and unexpected weight change.   HENT: Negative for dental problem, ear pain, mouth sores, rhinorrhea, sore throat, trouble swallowing and voice change.    Eyes: Negative for pain, redness and visual disturbance.   Respiratory: Negative for cough, chest tightness and wheezing.    Cardiovascular: Negative for chest pain, palpitations and leg swelling.   Gastrointestinal: Negative for abdominal pain, anal bleeding, blood in stool, constipation, diarrhea, nausea, rectal pain and vomiting.   Endocrine: Negative for cold intolerance, heat intolerance, polydipsia, polyphagia and polyuria.   Genitourinary: Negative for dysuria, frequency, hematuria and urgency.   Musculoskeletal: Negative for arthralgias, back pain, joint swelling, myalgias and neck pain.   Skin: Negative for rash.   Allergic/Immunologic: Negative for environmental allergies, food allergies and immunocompromised state.   Neurological: Negative for dizziness, seizures, weakness, numbness and headaches.   Hematological: Does not bruise/bleed easily.   Psychiatric/Behavioral: Negative for sleep disturbance. The patient is not nervous/anxious.          Result Review :      ENDOSCOPY, INT (03/05/2018)  SCANNED - COLONOSCOPY (08/09/2017)  Comprehensive Metabolic Panel (03/07/2020 02:00)  TSH (03/07/2020  "02:00)  GASTROENTEROLOGY - SCAN - [facility] / OV NOTE / 5/29/2019 (05/29/2019)      Vital Signs:   /90   Pulse 67   Temp 96.8 °F (36 °C)   Ht 154.9 cm (61\")   Wt 107 kg (235 lb)   SpO2 99%   BMI 44.40 kg/m²     Body mass index is 44.4 kg/m².           Assessment and Plan      Diagnoses and all orders for this visit:    1. Owens's esophagus without dysplasia (Primary)  Comments:  indefinate at one point, rebiopsied as negative    2. Adenomatous polyp of colon, unspecified part of colon    3. Gastroesophageal reflux disease, unspecified whether esophagitis present    Other orders  -     omeprazole (priLOSEC) 40 MG capsule; Take 1 capsule by mouth Daily.  Dispense: 90 capsule; Refill: 3              Documentation by Vonda HEARD acting as a scribe in the following sections (HPI, Assessment, Plan) for the undersigned provider.     Follow Up   No follow-ups on file.     Patient Instructions   1. For GERD, continue omeprazole 40 mg daily. Refills sent to your pharmacy.     2. For ongoing surveillance of Owens's esophagus, we will schedule an EGD.     3. For colon cancer screening, you will be due for your next colonoscopy in 2022.      Discussion:  We will continue daily PPI and schedule EGD for surveillance of Owens's esophagus.     "

## 2021-02-02 ENCOUNTER — OFFICE VISIT (OUTPATIENT)
Dept: GASTROENTEROLOGY | Facility: CLINIC | Age: 66
End: 2021-02-02

## 2021-02-02 VITALS
BODY MASS INDEX: 44.37 KG/M2 | DIASTOLIC BLOOD PRESSURE: 90 MMHG | WEIGHT: 235 LBS | TEMPERATURE: 96.8 F | HEART RATE: 67 BPM | HEIGHT: 61 IN | SYSTOLIC BLOOD PRESSURE: 130 MMHG | OXYGEN SATURATION: 99 %

## 2021-02-02 DIAGNOSIS — D12.6 ADENOMATOUS POLYP OF COLON, UNSPECIFIED PART OF COLON: ICD-10-CM

## 2021-02-02 DIAGNOSIS — K21.9 GASTROESOPHAGEAL REFLUX DISEASE, UNSPECIFIED WHETHER ESOPHAGITIS PRESENT: Chronic | ICD-10-CM

## 2021-02-02 DIAGNOSIS — K22.70 BARRETT'S ESOPHAGUS WITHOUT DYSPLASIA: Primary | ICD-10-CM

## 2021-02-02 PROCEDURE — 99214 OFFICE O/P EST MOD 30 MIN: CPT | Performed by: INTERNAL MEDICINE

## 2021-02-02 RX ORDER — OMEPRAZOLE 40 MG/1
40 CAPSULE, DELAYED RELEASE ORAL DAILY
Qty: 90 CAPSULE | Refills: 3 | Status: SHIPPED | OUTPATIENT
Start: 2021-02-02 | End: 2022-04-21 | Stop reason: SDUPTHER

## 2021-02-02 NOTE — PATIENT INSTRUCTIONS
1. For GERD, continue omeprazole 40 mg daily. Refills sent to your pharmacy.     2. For ongoing surveillance of Owens's esophagus, we will schedule an EGD.     3. For colon cancer screening, you will be due for your next colonoscopy in 2022.

## 2021-02-03 ENCOUNTER — PREP FOR SURGERY (OUTPATIENT)
Dept: SURGERY | Facility: SURGERY CENTER | Age: 66
End: 2021-02-03

## 2021-02-03 DIAGNOSIS — R10.9 ABDOMINAL PAIN: Primary | ICD-10-CM

## 2021-02-03 RX ORDER — SODIUM CHLORIDE 0.9 % (FLUSH) 0.9 %
3 SYRINGE (ML) INJECTION EVERY 12 HOURS SCHEDULED
Status: CANCELLED | OUTPATIENT
Start: 2021-02-03

## 2021-02-03 RX ORDER — SODIUM CHLORIDE, SODIUM LACTATE, POTASSIUM CHLORIDE, CALCIUM CHLORIDE 600; 310; 30; 20 MG/100ML; MG/100ML; MG/100ML; MG/100ML
30 INJECTION, SOLUTION INTRAVENOUS CONTINUOUS PRN
Status: CANCELLED | OUTPATIENT
Start: 2021-02-03

## 2021-02-03 RX ORDER — SODIUM CHLORIDE 0.9 % (FLUSH) 0.9 %
10 SYRINGE (ML) INJECTION AS NEEDED
Status: CANCELLED | OUTPATIENT
Start: 2021-02-03

## 2021-02-04 ENCOUNTER — PREP FOR SURGERY (OUTPATIENT)
Dept: SURGERY | Facility: SURGERY CENTER | Age: 66
End: 2021-02-04

## 2021-02-15 ENCOUNTER — TRANSCRIBE ORDERS (OUTPATIENT)
Dept: LAB | Facility: SURGERY CENTER | Age: 66
End: 2021-02-15

## 2021-02-15 DIAGNOSIS — Z01.818 OTHER SPECIFIED PRE-OPERATIVE EXAMINATION: Primary | ICD-10-CM

## 2021-03-08 ENCOUNTER — APPOINTMENT (OUTPATIENT)
Dept: LAB | Facility: SURGERY CENTER | Age: 66
End: 2021-03-08

## 2021-03-08 DIAGNOSIS — I10 BENIGN ESSENTIAL HYPERTENSION: Primary | ICD-10-CM

## 2021-03-08 DIAGNOSIS — R73.9 HYPERGLYCEMIA: ICD-10-CM

## 2021-03-08 DIAGNOSIS — E78.49 OTHER HYPERLIPIDEMIA: ICD-10-CM

## 2021-03-08 LAB
ALBUMIN SERPL-MCNC: 4.8 G/DL (ref 3.5–5.2)
ALBUMIN/GLOB SERPL: 1.9 G/DL
ALP SERPL-CCNC: 83 U/L (ref 39–117)
ALT SERPL-CCNC: 29 U/L (ref 1–33)
AST SERPL-CCNC: 24 U/L (ref 1–32)
BILIRUB SERPL-MCNC: 0.5 MG/DL (ref 0–1.2)
BUN SERPL-MCNC: 19 MG/DL (ref 8–23)
BUN/CREAT SERPL: 23.5 (ref 7–25)
CALCIUM SERPL-MCNC: 9.5 MG/DL (ref 8.6–10.5)
CHLORIDE SERPL-SCNC: 98 MMOL/L (ref 98–107)
CHOLEST SERPL-MCNC: 185 MG/DL (ref 0–200)
CO2 SERPL-SCNC: 28 MMOL/L (ref 22–29)
CREAT SERPL-MCNC: 0.81 MG/DL (ref 0.57–1)
GLOBULIN SER CALC-MCNC: 2.5 GM/DL
GLUCOSE SERPL-MCNC: 115 MG/DL (ref 65–99)
HBA1C MFR BLD: 5.8 % (ref 4.8–5.6)
HDLC SERPL-MCNC: 47 MG/DL (ref 40–60)
LDLC SERPL CALC-MCNC: 101 MG/DL (ref 0–100)
POTASSIUM SERPL-SCNC: 3.9 MMOL/L (ref 3.5–5.2)
PROT SERPL-MCNC: 7.3 G/DL (ref 6–8.5)
SODIUM SERPL-SCNC: 139 MMOL/L (ref 136–145)
TRIGL SERPL-MCNC: 214 MG/DL (ref 0–150)
VLDLC SERPL CALC-MCNC: 37 MG/DL (ref 5–40)

## 2021-03-15 ENCOUNTER — OFFICE VISIT (OUTPATIENT)
Dept: INTERNAL MEDICINE | Facility: CLINIC | Age: 66
End: 2021-03-15

## 2021-03-15 VITALS
HEART RATE: 74 BPM | DIASTOLIC BLOOD PRESSURE: 76 MMHG | HEIGHT: 61 IN | TEMPERATURE: 97.3 F | SYSTOLIC BLOOD PRESSURE: 136 MMHG | BODY MASS INDEX: 44.75 KG/M2 | WEIGHT: 237 LBS

## 2021-03-15 DIAGNOSIS — Z00.00 PHYSICAL EXAM, ANNUAL: ICD-10-CM

## 2021-03-15 DIAGNOSIS — G47.33 OBSTRUCTIVE SLEEP APNEA SYNDROME: ICD-10-CM

## 2021-03-15 DIAGNOSIS — I10 BENIGN ESSENTIAL HYPERTENSION: ICD-10-CM

## 2021-03-15 DIAGNOSIS — K22.70 BARRETT'S ESOPHAGUS WITHOUT DYSPLASIA: ICD-10-CM

## 2021-03-15 DIAGNOSIS — Z12.31 VISIT FOR SCREENING MAMMOGRAM: ICD-10-CM

## 2021-03-15 DIAGNOSIS — E78.49 OTHER HYPERLIPIDEMIA: ICD-10-CM

## 2021-03-15 DIAGNOSIS — I77.810 ASCENDING AORTA DILATATION (HCC): Primary | ICD-10-CM

## 2021-03-15 DIAGNOSIS — R73.03 PREDIABETES: ICD-10-CM

## 2021-03-15 PROCEDURE — 99396 PREV VISIT EST AGE 40-64: CPT | Performed by: INTERNAL MEDICINE

## 2021-03-15 NOTE — PROGRESS NOTES
Subjective   Ashley Romero is a 65 y.o. female and is here for a comprehensive physical exam. The patient reports no problems.  She has not lost any weight.   Father had Covid- she was not exposed.    She is due mmg this month.    She has no specific complaints today.    Social History     Socioeconomic History   • Marital status:      Spouse name: Not on file   • Number of children: Not on file   • Years of education: Not on file   • Highest education level: Not on file   Tobacco Use   • Smoking status: Never Smoker   • Smokeless tobacco: Never Used   Substance and Sexual Activity   • Alcohol use: Yes     Comment: OCCASIONALLY   • Drug use: No   • Sexual activity: Defer       Family History   Problem Relation Age of Onset   • Diabetes Mother    • Graves' disease Mother    • Heart disease Father    • Colonic polyp Maternal Grandfather    • Colon polyps Maternal Grandfather    • Breast cancer Maternal Aunt    • Malig Hyperthermia Neg Hx    • Crohn's disease Neg Hx    • Colon cancer Neg Hx    • Irritable bowel syndrome Neg Hx    • Ulcerative colitis Neg Hx           Past Medical History:   Diagnosis Date   • Arthritis    • Owens esophagus    • Benign essential hypertension    • Depression    • Family history of colonic polyps    • GERD (gastroesophageal reflux disease)    • History of ulcer disease    • Hyperglycemia    • Hyperlipidemia    • Nonalcoholic fatty liver disease    • Sleep apnea     CPAP   • Viral illness    • Yeast infection           Current Outpatient Medications:   •  acetaminophen (TYLENOL) 500 MG tablet, Take 1,000 mg by mouth Every 6 (Six) Hours As Needed for Mild Pain ., Disp: , Rfl:   •  amLODIPine (NORVASC) 5 MG tablet, TAKE 1 TABLET BY MOUTH DAILY, Disp: 90 tablet, Rfl: 1  •  aspirin 81 MG EC tablet, Take 1 tablet by mouth 2 (Two) Times a Day With Meals., Disp: 60 tablet, Rfl: 0  •  atorvastatin (LIPITOR) 40 MG tablet, Take 40 mg by mouth Daily., Disp: , Rfl:   •  hydroCHLOROthiazide  "(HYDRODIURIL) 25 MG tablet, TAKE 1 TABLET BY MOUTH DAILY, Disp: 90 tablet, Rfl: 2  •  losartan (COZAAR) 100 MG tablet, TAKE 1 TABLET BY MOUTH DAILY, Disp: 90 tablet, Rfl: 3  •  meloxicam (MOBIC) 15 MG tablet, , Disp: , Rfl:   •  metoprolol succinate XL (TOPROL-XL) 25 MG 24 hr tablet, Take 25 mg by mouth Daily., Disp: , Rfl:   •  omeprazole (priLOSEC) 40 MG capsule, Take 1 capsule by mouth Daily., Disp: 90 capsule, Rfl: 3  •  PARoxetine (PAXIL) 20 MG tablet, TAKE 1 TABLET BY MOUTH DAILY, Disp: 90 tablet, Rfl: 3  •  Zinc Sulfate (ZINC 15 PO), Take  by mouth., Disp: , Rfl:     Review of Systems    Review of Systems   Constitutional: Negative for fatigue and unexpected weight loss.   Eyes: Negative for visual disturbance.   Respiratory: Negative for shortness of breath.    Cardiovascular: Negative for chest pain and palpitations.   Gastrointestinal: Negative for abdominal pain and blood in stool.   Endocrine: Negative for cold intolerance.   Genitourinary: Negative for breast lump, decreased libido and menstrual problem.   Musculoskeletal: Negative for arthralgias and gait problem.   Neurological: Negative for memory problem.   Psychiatric/Behavioral: Negative for depressed mood.        There were no vitals filed for this visit.    Vitals:    03/15/21 0913   BP: 136/76   Pulse: 74   Temp: 97.3 °F (36.3 °C)   Weight: 108 kg (237 lb)   Height: 154.9 cm (61\")     Body mass index is 44.78 kg/m².  Wt Readings from Last 3 Encounters:   03/15/21 108 kg (237 lb)   02/02/21 107 kg (235 lb)   09/09/20 107 kg (236 lb)      Objective     Physical Exam  Constitutional:       General: She is not in acute distress.  Eyes:      Conjunctiva/sclera: Conjunctivae normal.   Neck:      Thyroid: No thyromegaly.   Cardiovascular:      Rate and Rhythm: Normal rate and regular rhythm.      Heart sounds: Normal heart sounds.   Pulmonary:      Effort: Pulmonary effort is normal.      Breath sounds: Normal breath sounds.   Chest:      Breasts:     "     Right: Normal.         Left: Normal.   Abdominal:      General: Bowel sounds are normal.      Palpations: Abdomen is soft.   Lymphadenopathy:      Cervical: No cervical adenopathy.   Skin:     General: Skin is warm and dry.   Neurological:      Mental Status: She is alert and oriented to person, place, and time.   Psychiatric:         Behavior: Behavior normal.         Thought Content: Thought content normal.         Judgment: Judgment normal.         Procedures       Assessment/Plan         Diagnoses and all orders for this visit:    1. Ascending aorta dilatation (CMS/HCC) (Primary)  Comments:  seeing Dr. Vo this month.    2. Owens's esophagus without dysplasia  Comments:  has EGD scheduled for f/u    3. Benign essential hypertension  Comments:  controlled.     4. Other hyperlipidemia  Comments:  at goal    5. Prediabetes  Comments:  stressed diet is totally responsible.  She has a plan to get under control.   Orders:  -     Comprehensive Metabolic Panel; Future  -     Hemoglobin A1c; Future    6. Obstructive sleep apnea syndrome  Comments:  nightly use    7. Visit for screening mammogram  Comments:  ordered  Orders:  -     Mammo Screening Bilateral With CAD    8. Physical exam, annual  Comments:  considering Covid vaccine.  Needs shingles.  Stressed weight control. .recommend [email or web address]        Return in about 6 months (around 9/15/2021) for Recheck, Lab Before FUP.

## 2021-03-24 ENCOUNTER — TRANSCRIBE ORDERS (OUTPATIENT)
Dept: ADMINISTRATIVE | Facility: HOSPITAL | Age: 66
End: 2021-03-24

## 2021-03-24 DIAGNOSIS — Z12.31 VISIT FOR SCREENING MAMMOGRAM: Primary | ICD-10-CM

## 2021-04-05 RX ORDER — AMLODIPINE BESYLATE 5 MG/1
5 TABLET ORAL DAILY
Qty: 90 TABLET | Refills: 1 | Status: SHIPPED | OUTPATIENT
Start: 2021-04-05 | End: 2021-09-30

## 2021-05-10 RX ORDER — PAROXETINE HYDROCHLORIDE 20 MG/1
20 TABLET, FILM COATED ORAL DAILY
Qty: 90 TABLET | Refills: 1 | Status: SHIPPED | OUTPATIENT
Start: 2021-05-10 | End: 2021-10-29

## 2021-06-01 ENCOUNTER — LAB (OUTPATIENT)
Dept: LAB | Facility: SURGERY CENTER | Age: 66
End: 2021-06-01

## 2021-06-01 DIAGNOSIS — Z01.818 OTHER SPECIFIED PRE-OPERATIVE EXAMINATION: ICD-10-CM

## 2021-06-01 LAB — SARS-COV-2 ORF1AB RESP QL NAA+PROBE: NOT DETECTED

## 2021-06-01 PROCEDURE — U0004 COV-19 TEST NON-CDC HGH THRU: HCPCS | Performed by: SURGERY

## 2021-06-01 PROCEDURE — U0005 INFEC AGEN DETEC AMPLI PROBE: HCPCS | Performed by: SURGERY

## 2021-06-01 PROCEDURE — C9803 HOPD COVID-19 SPEC COLLECT: HCPCS

## 2021-06-03 ENCOUNTER — ANESTHESIA EVENT (OUTPATIENT)
Dept: SURGERY | Facility: SURGERY CENTER | Age: 66
End: 2021-06-03

## 2021-06-03 ENCOUNTER — HOSPITAL ENCOUNTER (OUTPATIENT)
Facility: SURGERY CENTER | Age: 66
Setting detail: HOSPITAL OUTPATIENT SURGERY
Discharge: HOME | End: 2021-06-03
Attending: INTERNAL MEDICINE | Admitting: INTERNAL MEDICINE

## 2021-06-03 ENCOUNTER — ANESTHESIA (OUTPATIENT)
Dept: SURGERY | Facility: SURGERY CENTER | Age: 66
End: 2021-06-03

## 2021-06-03 VITALS
OXYGEN SATURATION: 98 % | HEIGHT: 61 IN | DIASTOLIC BLOOD PRESSURE: 74 MMHG | SYSTOLIC BLOOD PRESSURE: 118 MMHG | BODY MASS INDEX: 43.2 KG/M2 | HEART RATE: 61 BPM | RESPIRATION RATE: 16 BRPM | WEIGHT: 228.8 LBS | TEMPERATURE: 98.2 F

## 2021-06-03 DIAGNOSIS — K21.9 GASTROESOPHAGEAL REFLUX DISEASE, UNSPECIFIED WHETHER ESOPHAGITIS PRESENT: ICD-10-CM

## 2021-06-03 DIAGNOSIS — R10.9 ABDOMINAL PAIN: ICD-10-CM

## 2021-06-03 DIAGNOSIS — K22.70 BARRETT'S ESOPHAGUS WITHOUT DYSPLASIA: ICD-10-CM

## 2021-06-03 PROCEDURE — 88312 SPECIAL STAINS GROUP 1: CPT | Performed by: INTERNAL MEDICINE

## 2021-06-03 PROCEDURE — 43239 EGD BIOPSY SINGLE/MULTIPLE: CPT | Performed by: INTERNAL MEDICINE

## 2021-06-03 PROCEDURE — 25010000002 PROPOFOL 10 MG/ML EMULSION: Performed by: NURSE ANESTHETIST, CERTIFIED REGISTERED

## 2021-06-03 PROCEDURE — 88305 TISSUE EXAM BY PATHOLOGIST: CPT | Performed by: INTERNAL MEDICINE

## 2021-06-03 PROCEDURE — 87081 CULTURE SCREEN ONLY: CPT | Performed by: INTERNAL MEDICINE

## 2021-06-03 PROCEDURE — 0DB98ZX EXCISION OF DUODENUM, VIA NATURAL OR ARTIFICIAL OPENING ENDOSCOPIC, DIAGNOSTIC: ICD-10-PCS | Performed by: INTERNAL MEDICINE

## 2021-06-03 RX ORDER — SODIUM CHLORIDE 0.9 % (FLUSH) 0.9 %
3 SYRINGE (ML) INJECTION EVERY 12 HOURS SCHEDULED
Status: DISCONTINUED | OUTPATIENT
Start: 2021-06-03 | End: 2021-06-03 | Stop reason: HOSPADM

## 2021-06-03 RX ORDER — LIDOCAINE HYDROCHLORIDE 10 MG/ML
0.5 INJECTION, SOLUTION INFILTRATION; PERINEURAL ONCE AS NEEDED
Status: DISCONTINUED | OUTPATIENT
Start: 2021-06-03 | End: 2021-06-03 | Stop reason: HOSPADM

## 2021-06-03 RX ORDER — SODIUM CHLORIDE 0.9 % (FLUSH) 0.9 %
10 SYRINGE (ML) INJECTION AS NEEDED
Status: DISCONTINUED | OUTPATIENT
Start: 2021-06-03 | End: 2021-06-03 | Stop reason: HOSPADM

## 2021-06-03 RX ORDER — SODIUM CHLORIDE, SODIUM LACTATE, POTASSIUM CHLORIDE, CALCIUM CHLORIDE 600; 310; 30; 20 MG/100ML; MG/100ML; MG/100ML; MG/100ML
9 INJECTION, SOLUTION INTRAVENOUS CONTINUOUS PRN
Status: DISCONTINUED | OUTPATIENT
Start: 2021-06-03 | End: 2021-06-03 | Stop reason: HOSPADM

## 2021-06-03 RX ORDER — SODIUM CHLORIDE, SODIUM LACTATE, POTASSIUM CHLORIDE, CALCIUM CHLORIDE 600; 310; 30; 20 MG/100ML; MG/100ML; MG/100ML; MG/100ML
30 INJECTION, SOLUTION INTRAVENOUS CONTINUOUS PRN
Status: DISCONTINUED | OUTPATIENT
Start: 2021-06-03 | End: 2022-03-30

## 2021-06-03 RX ORDER — SODIUM CHLORIDE, SODIUM LACTATE, POTASSIUM CHLORIDE, CALCIUM CHLORIDE 600; 310; 30; 20 MG/100ML; MG/100ML; MG/100ML; MG/100ML
1000 INJECTION, SOLUTION INTRAVENOUS CONTINUOUS
Status: DISCONTINUED | OUTPATIENT
Start: 2021-06-03 | End: 2021-06-03 | Stop reason: HOSPADM

## 2021-06-03 RX ORDER — LIDOCAINE HYDROCHLORIDE 20 MG/ML
INJECTION, SOLUTION INFILTRATION; PERINEURAL AS NEEDED
Status: DISCONTINUED | OUTPATIENT
Start: 2021-06-03 | End: 2021-06-03 | Stop reason: SURG

## 2021-06-03 RX ORDER — ONDANSETRON 2 MG/ML
4 INJECTION INTRAMUSCULAR; INTRAVENOUS ONCE AS NEEDED
Status: DISCONTINUED | OUTPATIENT
Start: 2021-06-03 | End: 2021-06-03 | Stop reason: HOSPADM

## 2021-06-03 RX ORDER — PROPOFOL 10 MG/ML
VIAL (ML) INTRAVENOUS AS NEEDED
Status: DISCONTINUED | OUTPATIENT
Start: 2021-06-03 | End: 2021-06-03 | Stop reason: SURG

## 2021-06-03 RX ADMIN — PROPOFOL 160 MCG/KG/MIN: 10 INJECTION, EMULSION INTRAVENOUS at 07:27

## 2021-06-03 RX ADMIN — LIDOCAINE HYDROCHLORIDE 60 MG: 20 INJECTION, SOLUTION INFILTRATION; PERINEURAL at 07:26

## 2021-06-03 RX ADMIN — SODIUM CHLORIDE, POTASSIUM CHLORIDE, SODIUM LACTATE AND CALCIUM CHLORIDE 1000 ML: 600; 310; 30; 20 INJECTION, SOLUTION INTRAVENOUS at 06:34

## 2021-06-03 RX ADMIN — PROPOFOL 150 MG: 10 INJECTION, EMULSION INTRAVENOUS at 07:26

## 2021-06-03 RX ADMIN — GLYCOPYRROLATE 0.2 MG: 0.2 INJECTION, SOLUTION INTRAMUSCULAR; INTRAVITREAL at 07:26

## 2021-06-03 RX ADMIN — FAMOTIDINE 20 MG: 10 INJECTION INTRAVENOUS at 07:05

## 2021-06-03 NOTE — ANESTHESIA PREPROCEDURE EVALUATION
Anesthesia Evaluation     Patient summary reviewed and Nursing notes reviewed   NPO Solid Status: > 8 hours  NPO Liquid Status: > 2 hours           Airway   Mallampati: III  Possible difficult intubation  Dental - normal exam         Pulmonary - normal exam   (+) sleep apnea on CPAP,   Cardiovascular - normal exam    ECG reviewed    (+) hypertension, hyperlipidemia,       Neuro/Psych  (+) syncope, psychiatric history Depression,     GI/Hepatic/Renal/Endo    (+) morbid obesity, GERD,  liver disease fatty liver disease,     Musculoskeletal     Abdominal   (+) obese,    Substance History      OB/GYN          Other                        Anesthesia Plan    ASA 3     MAC

## 2021-06-03 NOTE — ANESTHESIA POSTPROCEDURE EVALUATION
"Patient: Ashley Romero    Procedure Summary     Date: 06/03/21 Room / Location: [facility]  [facility]    Anesthesia Start: 0721 Anesthesia Stop: 0741    Procedure: ESOPHAGOGASTRODUODENOSCOPY (Left ) Diagnosis:       Owens's esophagus without dysplasia      Gastroesophageal reflux disease, unspecified whether esophagitis present      (Owens's esophagus without dysplasia [K22.70])      (Gastroesophageal reflux disease, unspecified whether esophagitis present [K21.9])    Surgeons: Anderson Garcia MD Provider: Nikki Theodore MD    Anesthesia Type: MAC ASA Status: 3          Anesthesia Type: MAC    Vitals  Vitals Value Taken Time   /74 06/03/21 0753   Temp 36.8 °C (98.2 °F) 06/03/21 0740   Pulse 57 06/03/21 0754   Resp 16 06/03/21 0753   SpO2 95 % 06/03/21 0754   Vitals shown include unvalidated device data.        Post Anesthesia Care and Evaluation    Patient location during evaluation: PHASE II  Patient participation: complete - patient participated  Level of consciousness: awake  Pain management: adequate  Airway patency: patent  Anesthetic complications: No anesthetic complications    Cardiovascular status: acceptable  Respiratory status: acceptable  Hydration status: acceptable    Comments: /74   Pulse 61   Temp 36.8 °C (98.2 °F) (Temporal)   Resp 16   Ht 154.9 cm (61\")   Wt 104 kg (228 lb 12.8 oz)   SpO2 98%   BMI 43.23 kg/m²       "

## 2021-06-03 NOTE — ADDENDUM NOTE
Addendum  created 06/03/21 0945 by Nikki Theodore MD    Attestation recorded in Intraprocedure, Intraprocedure Attestations filed

## 2021-06-03 NOTE — H&P
No chief complaint on file.      HPI  barretts  gerd         Problem List:    Patient Active Problem List   Diagnosis   • OA (osteoarthritis) of knee   • Ascending aorta dilatation (CMS/HCC)   • Benign essential hypertension   • Hyperlipidemia   • Vasovagal syncope   • Obstructive sleep apnea syndrome   • Owens's esophagus without dysplasia   • Gastroesophageal reflux disease   • Prediabetes       Medical History:    Past Medical History:   Diagnosis Date   • Arthritis    • Owens esophagus    • Benign essential hypertension    • Depression    • Family history of colonic polyps    • GERD (gastroesophageal reflux disease)    • History of ulcer disease    • Hyperglycemia    • Hyperlipidemia    • Nonalcoholic fatty liver disease    • Sleep apnea     CPAP   • Viral illness    • Yeast infection         Social History:    Social History     Socioeconomic History   • Marital status:      Spouse name: Not on file   • Number of children: Not on file   • Years of education: Not on file   • Highest education level: Not on file   Tobacco Use   • Smoking status: Never Smoker   • Smokeless tobacco: Never Used   Vaping Use   • Vaping Use: Never used   Substance and Sexual Activity   • Alcohol use: Yes     Comment: OCCASIONALLY   • Drug use: No   • Sexual activity: Defer       Family History:   Family History   Problem Relation Age of Onset   • Diabetes Mother    • Graves' disease Mother    • Heart disease Father    • Colonic polyp Maternal Grandfather    • Colon polyps Maternal Grandfather    • Breast cancer Maternal Aunt    • Malig Hyperthermia Neg Hx    • Crohn's disease Neg Hx    • Colon cancer Neg Hx    • Irritable bowel syndrome Neg Hx    • Ulcerative colitis Neg Hx        Surgical History:   Past Surgical History:   Procedure Laterality Date   • COLONOSCOPY  2005    Dr Garcia   • ENDOSCOPY     • TONSILLECTOMY     • TOTAL KNEE ARTHROPLASTY Right 3/14/2019    Procedure: RIGHT TOTAL KNEE ARTHROPLASTY;  Surgeon:  Estevan Esquivel MD;  Location: [facility] OR;  Service: Orthopedics   • TOTAL KNEE ARTHROPLASTY Right 03/2019   • TOTAL KNEE ARTHROPLASTY Left 8/15/2019    Procedure: TOTAL KNEE ARTHROPLASTY;  Surgeon: Estevan Esquivel MD;  Location: [facility] OR;  Service: Orthopedics   • TUBAL ABDOMINAL LIGATION     • UPPER GASTROINTESTINAL ENDOSCOPY           Current Facility-Administered Medications:   •  lactated ringers infusion 1,000 mL, 1,000 mL, Intravenous, Continuous, Anderson Garcia MD, Last Rate: 25 mL/hr at 06/03/21 0634, 1,000 mL at 06/03/21 0634  •  lactated ringers infusion, 9 mL/hr, Intravenous, Continuous PRN, Nikki Theodore MD  •  lidocaine (XYLOCAINE) 1 % injection 0.5 mL, 0.5 mL, Intradermal, Once PRN, Anderson Garcia MD  •  sodium chloride 0.9 % flush 10 mL, 10 mL, Intravenous, PRN, Anderson Garcia MD  •  sodium chloride 0.9 % flush 10 mL, 10 mL, Intravenous, PRN, Anderson Garcia MD  •  sodium chloride 0.9 % flush 10 mL, 10 mL, Intravenous, PRN, Nikki Theodore MD  •  sodium chloride 0.9 % flush 3 mL, 3 mL, Intravenous, Q12H, Anderson Garcia MD    Allergies:   Allergies   Allergen Reactions   • Codeine Nausea Only        The following portions of the patient's history were reviewed by me and updated as appropriate: review of systems, allergies, current medications, past family history, past medical history, past social history, past surgical history and problem list.    Vitals:    06/03/21 0631   BP: 165/97   Pulse: 66   Resp: 16   Temp: 98.4 °F (36.9 °C)   SpO2: 97%       PHYSICAL EXAM:    CONSTITUTIONAL:  today's vital signs reviewed by me  GASTROINTESTINAL: abdomen is soft nontender nondistended with normal active bowel sounds, no masses are appreciated    Assessment/ Plan  barretts  gerd    egd    Risks and benefits as well as alternatives to endoscopic evaluation were explained to the patient and they voiced understanding and wish to proceed.  These risks include but are not  limited to the risk of bleeding, perforation, adverse reaction to sedation, and missed lesions.  The patient was given the opportunity to ask questions prior to the endoscopic procedure.

## 2021-06-04 DIAGNOSIS — R19.8 ABNORMAL FINDINGS ON ESOPHAGOGASTRODUODENOSCOPY (EGD): Primary | ICD-10-CM

## 2021-06-04 DIAGNOSIS — A04.8 H. PYLORI INFECTION: Primary | ICD-10-CM

## 2021-06-04 DIAGNOSIS — K21.00 GASTROESOPHAGEAL REFLUX DISEASE WITH ESOPHAGITIS, UNSPECIFIED WHETHER HEMORRHAGE: ICD-10-CM

## 2021-06-04 LAB — UREASE TISS QL: POSITIVE

## 2021-06-05 RX ORDER — METRONIDAZOLE 250 MG/1
250 TABLET ORAL 4 TIMES DAILY
Qty: 56 TABLET | Refills: 0 | Status: SHIPPED | OUTPATIENT
Start: 2021-06-05 | End: 2021-06-19

## 2021-06-05 RX ORDER — DOXYCYCLINE HYCLATE 100 MG/1
100 CAPSULE ORAL 2 TIMES DAILY
Qty: 28 CAPSULE | Refills: 0 | Status: SHIPPED | OUTPATIENT
Start: 2021-06-05 | End: 2021-09-15

## 2021-06-05 RX ORDER — OMEPRAZOLE 40 MG/1
40 CAPSULE, DELAYED RELEASE ORAL 2 TIMES DAILY
Qty: 28 CAPSULE | Refills: 0 | Status: SHIPPED | OUTPATIENT
Start: 2021-06-05 | End: 2021-06-19

## 2021-06-05 RX ORDER — DOXYCYCLINE HYCLATE 100 MG/1
100 CAPSULE ORAL DAILY
Qty: 14 CAPSULE | Refills: 0 | Status: SHIPPED | OUTPATIENT
Start: 2021-06-05 | End: 2021-06-05 | Stop reason: SDUPTHER

## 2021-06-05 RX ORDER — BISMUTH SUBSALICYLATE 262 MG
524 TABLET,CHEWABLE ORAL 4 TIMES DAILY
Qty: 112 TABLET | Refills: 0 | Status: SHIPPED | OUTPATIENT
Start: 2021-06-05 | End: 2021-09-15

## 2021-06-05 RX ORDER — TETRACYCLINE HYDROCHLORIDE 500 MG/1
500 CAPSULE ORAL 4 TIMES DAILY
Qty: 56 CAPSULE | Refills: 0 | Status: CANCELLED | OUTPATIENT
Start: 2021-06-05

## 2021-06-07 LAB
LAB AP CASE REPORT: NORMAL
PATH REPORT.ADDENDUM SPEC: NORMAL
PATH REPORT.FINAL DX SPEC: NORMAL
PATH REPORT.GROSS SPEC: NORMAL

## 2021-06-16 ENCOUNTER — HOSPITAL ENCOUNTER (OUTPATIENT)
Dept: MAMMOGRAPHY | Facility: HOSPITAL | Age: 66
Discharge: HOME | End: 2021-06-16
Admitting: INTERNAL MEDICINE

## 2021-06-16 DIAGNOSIS — Z12.31 VISIT FOR SCREENING MAMMOGRAM: ICD-10-CM

## 2021-06-16 PROCEDURE — 77063 BREAST TOMOSYNTHESIS BI: CPT

## 2021-06-16 PROCEDURE — 77067 SCR MAMMO BI INCL CAD: CPT

## 2021-06-21 ENCOUNTER — HOSPITAL ENCOUNTER (OUTPATIENT)
Dept: NUCLEAR MEDICINE | Facility: HOSPITAL | Age: 66
Discharge: HOME | End: 2021-06-21

## 2021-06-21 DIAGNOSIS — R19.8 ABNORMAL FINDINGS ON ESOPHAGOGASTRODUODENOSCOPY (EGD): ICD-10-CM

## 2021-06-21 DIAGNOSIS — K21.00 GASTROESOPHAGEAL REFLUX DISEASE WITH ESOPHAGITIS, UNSPECIFIED WHETHER HEMORRHAGE: ICD-10-CM

## 2021-06-21 PROCEDURE — 0 TECHNETIUM SULFUR COLLOID: Performed by: INTERNAL MEDICINE

## 2021-06-21 PROCEDURE — 78264 GASTRIC EMPTYING IMG STUDY: CPT

## 2021-06-21 PROCEDURE — A9541 TC99M SULFUR COLLOID: HCPCS | Performed by: INTERNAL MEDICINE

## 2021-06-21 RX ADMIN — TECHNETIUM TC 99M SULFUR COLLOID 1 DOSE: KIT at 07:37

## 2021-07-06 ENCOUNTER — LAB (OUTPATIENT)
Dept: LAB | Facility: HOSPITAL | Age: 66
End: 2021-07-06

## 2021-07-06 PROCEDURE — 83013 H PYLORI (C-13) BREATH: CPT | Performed by: NURSE PRACTITIONER

## 2021-07-06 RX ORDER — HYDROCHLOROTHIAZIDE 25 MG/1
25 TABLET ORAL DAILY
Qty: 90 TABLET | Refills: 2 | Status: SHIPPED | OUTPATIENT
Start: 2021-07-06 | End: 2022-03-30

## 2021-09-15 ENCOUNTER — OFFICE VISIT (OUTPATIENT)
Dept: INTERNAL MEDICINE | Facility: CLINIC | Age: 66
End: 2021-09-15

## 2021-09-15 VITALS
DIASTOLIC BLOOD PRESSURE: 76 MMHG | TEMPERATURE: 98.2 F | BODY MASS INDEX: 44.93 KG/M2 | SYSTOLIC BLOOD PRESSURE: 134 MMHG | WEIGHT: 238 LBS | HEIGHT: 61 IN

## 2021-09-15 DIAGNOSIS — R73.03 PREDIABETES: ICD-10-CM

## 2021-09-15 DIAGNOSIS — Z23 NEED FOR PNEUMOCOCCAL VACCINATION: Primary | ICD-10-CM

## 2021-09-15 DIAGNOSIS — I10 BENIGN ESSENTIAL HYPERTENSION: ICD-10-CM

## 2021-09-15 DIAGNOSIS — K31.84 GASTROPARESIS: ICD-10-CM

## 2021-09-15 PROCEDURE — G0009 ADMIN PNEUMOCOCCAL VACCINE: HCPCS | Performed by: INTERNAL MEDICINE

## 2021-09-15 PROCEDURE — 90670 PCV13 VACCINE IM: CPT | Performed by: INTERNAL MEDICINE

## 2021-09-15 PROCEDURE — 99213 OFFICE O/P EST LOW 20 MIN: CPT | Performed by: INTERNAL MEDICINE

## 2021-09-15 NOTE — PROGRESS NOTES
"Chief Complaint  Hypertension    Subjective          Ashley Romero presents to [facility]  History of Present Illness  Has not been watching her weight, etc.  She feels good but up 10 lbs.   She had a gastric emptying study which showed significant delay- she has not had any more issues    Objective   Vital Signs:   /76   Temp 98.2 °F (36.8 °C)   Ht 154.9 cm (61\")   Wt 108 kg (238 lb)   BMI 44.97 kg/m²     Physical Exam  Constitutional:       Appearance: Normal appearance. She is obese.   Cardiovascular:      Rate and Rhythm: Normal rate and regular rhythm.   Pulmonary:      Effort: Pulmonary effort is normal.   Musculoskeletal:      Right lower leg: No edema.      Left lower leg: No edema.        Result Review :   The following data was reviewed by: Andreia Schaefer MD on 09/15/2021:  Common labs    Common Labsle 3/8/21 3/8/21 3/8/21 9/8/21 9/8/21    1120 1120 1120 1017 1017   Glucose 115 (A)   113 (A)    BUN 19   16    Creatinine 0.81   0.74    eGFR Non  Am 71   79    eGFR African Am 86   95    Sodium 139   139    Potassium 3.9   3.8    Chloride 98   99    Calcium 9.5   9.6    Total Protein 7.3   7.1    Albumin 4.80   4.90    Total Bilirubin 0.5   0.5    Alkaline Phosphatase 83   82    AST (SGOT) 24   22    ALT (SGPT) 29   26    Total Cholesterol  185      Triglycerides  214 (A)      HDL Cholesterol  47      LDL Cholesterol   101 (A)      Hemoglobin A1C   5.80 (A)  6.00 (A)   (A) Abnormal value       Comments are available for some flowsheets but are not being displayed.           Data reviewed: Radiologic studies gastric emptying          Assessment and Plan    Diagnoses and all orders for this visit:    1. Need for pneumococcal vaccination (Primary)  -     Pneumococcal Conjugate Vaccine 13-Valent All (PCV13)    2. Benign essential hypertension  Comments:  controlled, no change.  Discussed weight , etc    3. Prediabetes  Comments:  Stable, discussed association " with diet.    4. Gastroparesis  Comments:  to f/u with arnulfo Alvarado, she isn't having much issue.        Follow Up   Return in about 6 months (around 3/15/2022) for Medicare Wellness, Lab Before FUP.  Patient was given instructions and counseling regarding her condition or for health maintenance advice. Please see specific information pulled into the AVS if appropriate.

## 2021-09-22 RX ORDER — MELOXICAM 15 MG/1
15 TABLET ORAL DAILY
Qty: 90 TABLET | Refills: 1 | Status: SHIPPED | OUTPATIENT
Start: 2021-09-22 | End: 2022-03-30

## 2021-09-30 RX ORDER — AMLODIPINE BESYLATE 5 MG/1
5 TABLET ORAL DAILY
Qty: 90 TABLET | Refills: 1 | Status: SHIPPED | OUTPATIENT
Start: 2021-09-30 | End: 2022-03-30

## 2021-10-29 RX ORDER — PAROXETINE HYDROCHLORIDE 20 MG/1
20 TABLET, FILM COATED ORAL DAILY
Qty: 90 TABLET | Refills: 1 | Status: SHIPPED | OUTPATIENT
Start: 2021-10-29 | End: 2022-05-03

## 2021-10-29 RX ORDER — LOSARTAN POTASSIUM 100 MG/1
100 TABLET ORAL DAILY
Qty: 90 TABLET | Refills: 3 | Status: SHIPPED | OUTPATIENT
Start: 2021-10-29 | End: 2022-10-24

## 2021-12-09 ENCOUNTER — PREP FOR SURGERY (OUTPATIENT)
Dept: SURGERY | Facility: SURGERY CENTER | Age: 66
End: 2021-12-09

## 2021-12-09 ENCOUNTER — OFFICE VISIT (OUTPATIENT)
Dept: GASTROENTEROLOGY | Facility: CLINIC | Age: 66
End: 2021-12-09

## 2021-12-09 VITALS
SYSTOLIC BLOOD PRESSURE: 144 MMHG | HEIGHT: 61 IN | BODY MASS INDEX: 46.82 KG/M2 | HEART RATE: 67 BPM | WEIGHT: 248 LBS | TEMPERATURE: 97.7 F | OXYGEN SATURATION: 99 % | DIASTOLIC BLOOD PRESSURE: 80 MMHG

## 2021-12-09 DIAGNOSIS — K22.70 BARRETT'S ESOPHAGUS WITHOUT DYSPLASIA: Primary | ICD-10-CM

## 2021-12-09 DIAGNOSIS — Z12.11 ENCOUNTER FOR SCREENING FOR MALIGNANT NEOPLASM OF COLON: Primary | ICD-10-CM

## 2021-12-09 DIAGNOSIS — K21.9 GASTROESOPHAGEAL REFLUX DISEASE, UNSPECIFIED WHETHER ESOPHAGITIS PRESENT: ICD-10-CM

## 2021-12-09 DIAGNOSIS — K31.84 GASTROPARESIS: ICD-10-CM

## 2021-12-09 PROCEDURE — 99213 OFFICE O/P EST LOW 20 MIN: CPT | Performed by: INTERNAL MEDICINE

## 2021-12-09 RX ORDER — SODIUM CHLORIDE, SODIUM LACTATE, POTASSIUM CHLORIDE, CALCIUM CHLORIDE 600; 310; 30; 20 MG/100ML; MG/100ML; MG/100ML; MG/100ML
30 INJECTION, SOLUTION INTRAVENOUS CONTINUOUS PRN
Status: CANCELLED | OUTPATIENT
Start: 2021-12-09

## 2021-12-09 RX ORDER — SODIUM CHLORIDE 0.9 % (FLUSH) 0.9 %
10 SYRINGE (ML) INJECTION AS NEEDED
Status: CANCELLED | OUTPATIENT
Start: 2021-12-09

## 2021-12-09 RX ORDER — SODIUM CHLORIDE 0.9 % (FLUSH) 0.9 %
3 SYRINGE (ML) INJECTION EVERY 12 HOURS SCHEDULED
Status: CANCELLED | OUTPATIENT
Start: 2021-12-09

## 2021-12-09 NOTE — PATIENT INSTRUCTIONS
For surveillance of Owens's esophagus, EGD recommended at 3-year interval, due June 2024.    For gastroparesis, follow a gastroparesis diet. Eat smaller more frequent meals as opposed to large meals and foods lower in fat and fiber.    For colon cancer screening, colonoscopy recommended August 2022.

## 2022-03-22 DIAGNOSIS — E78.49 OTHER HYPERLIPIDEMIA: ICD-10-CM

## 2022-03-22 DIAGNOSIS — Z79.899 ENCOUNTER FOR LONG-TERM CURRENT USE OF MEDICATION: ICD-10-CM

## 2022-03-22 DIAGNOSIS — I10 BENIGN ESSENTIAL HYPERTENSION: Primary | ICD-10-CM

## 2022-03-24 LAB
ALBUMIN SERPL-MCNC: 4.7 G/DL (ref 3.8–4.8)
ALBUMIN/GLOB SERPL: 1.6 {RATIO} (ref 1.2–2.2)
ALP SERPL-CCNC: 92 IU/L (ref 44–121)
ALT SERPL-CCNC: 26 IU/L (ref 0–32)
AST SERPL-CCNC: 24 IU/L (ref 0–40)
BASOPHILS # BLD AUTO: 0 X10E3/UL (ref 0–0.2)
BASOPHILS NFR BLD AUTO: 0 %
BILIRUB SERPL-MCNC: 0.4 MG/DL (ref 0–1.2)
BUN SERPL-MCNC: 20 MG/DL (ref 8–27)
BUN/CREAT SERPL: 24 (ref 12–28)
CALCIUM SERPL-MCNC: 10.3 MG/DL (ref 8.7–10.3)
CHLORIDE SERPL-SCNC: 96 MMOL/L (ref 96–106)
CHOLEST SERPL-MCNC: 160 MG/DL (ref 100–199)
CO2 SERPL-SCNC: 24 MMOL/L (ref 20–29)
CREAT SERPL-MCNC: 0.83 MG/DL (ref 0.57–1)
EGFRCR SERPLBLD CKD-EPI 2021: 78 ML/MIN/1.73
EOSINOPHIL # BLD AUTO: 0.3 X10E3/UL (ref 0–0.4)
EOSINOPHIL NFR BLD AUTO: 4 %
ERYTHROCYTE [DISTWIDTH] IN BLOOD BY AUTOMATED COUNT: 13.7 % (ref 11.7–15.4)
GLOBULIN SER CALC-MCNC: 2.9 G/DL (ref 1.5–4.5)
GLUCOSE SERPL-MCNC: 150 MG/DL (ref 65–99)
HCT VFR BLD AUTO: 38.3 % (ref 34–46.6)
HDLC SERPL-MCNC: 38 MG/DL
HGB BLD-MCNC: 12.4 G/DL (ref 11.1–15.9)
IMM GRANULOCYTES # BLD AUTO: 0 X10E3/UL (ref 0–0.1)
IMM GRANULOCYTES NFR BLD AUTO: 0 %
LDLC SERPL CALC-MCNC: 84 MG/DL (ref 0–99)
LYMPHOCYTES # BLD AUTO: 2.3 X10E3/UL (ref 0.7–3.1)
LYMPHOCYTES NFR BLD AUTO: 32 %
MCH RBC QN AUTO: 28.1 PG (ref 26.6–33)
MCHC RBC AUTO-ENTMCNC: 32.4 G/DL (ref 31.5–35.7)
MCV RBC AUTO: 87 FL (ref 79–97)
MONOCYTES # BLD AUTO: 0.6 X10E3/UL (ref 0.1–0.9)
MONOCYTES NFR BLD AUTO: 8 %
NEUTROPHILS # BLD AUTO: 4 X10E3/UL (ref 1.4–7)
NEUTROPHILS NFR BLD AUTO: 56 %
PLATELET # BLD AUTO: 314 X10E3/UL (ref 150–450)
POTASSIUM SERPL-SCNC: 3.8 MMOL/L (ref 3.5–5.2)
PROT SERPL-MCNC: 7.6 G/DL (ref 6–8.5)
RBC # BLD AUTO: 4.41 X10E6/UL (ref 3.77–5.28)
SODIUM SERPL-SCNC: 140 MMOL/L (ref 134–144)
TRIGL SERPL-MCNC: 230 MG/DL (ref 0–149)
VLDLC SERPL CALC-MCNC: 38 MG/DL (ref 5–40)
WBC # BLD AUTO: 7.3 X10E3/UL (ref 3.4–10.8)

## 2022-03-30 ENCOUNTER — OFFICE VISIT (OUTPATIENT)
Dept: INTERNAL MEDICINE | Facility: CLINIC | Age: 67
End: 2022-03-30

## 2022-03-30 VITALS
SYSTOLIC BLOOD PRESSURE: 124 MMHG | HEIGHT: 61 IN | HEART RATE: 78 BPM | TEMPERATURE: 97.3 F | WEIGHT: 248 LBS | BODY MASS INDEX: 46.82 KG/M2 | DIASTOLIC BLOOD PRESSURE: 72 MMHG

## 2022-03-30 DIAGNOSIS — M85.88 OTHER SPECIFIED DISORDERS OF BONE DENSITY AND STRUCTURE, OTHER SITE: ICD-10-CM

## 2022-03-30 DIAGNOSIS — Z00.00 PHYSICAL EXAM: ICD-10-CM

## 2022-03-30 DIAGNOSIS — R73.03 PREDIABETES: Primary | ICD-10-CM

## 2022-03-30 DIAGNOSIS — I25.10 CAD IN NATIVE ARTERY: ICD-10-CM

## 2022-03-30 DIAGNOSIS — E78.49 OTHER HYPERLIPIDEMIA: ICD-10-CM

## 2022-03-30 PROCEDURE — 99397 PER PM REEVAL EST PAT 65+ YR: CPT | Performed by: INTERNAL MEDICINE

## 2022-03-30 RX ORDER — AMLODIPINE BESYLATE 5 MG/1
5 TABLET ORAL DAILY
Qty: 90 TABLET | Refills: 1 | Status: SHIPPED | OUTPATIENT
Start: 2022-03-30 | End: 2022-09-28

## 2022-03-30 RX ORDER — MELOXICAM 15 MG/1
15 TABLET ORAL DAILY
Qty: 90 TABLET | Refills: 1 | Status: SHIPPED | OUTPATIENT
Start: 2022-03-30 | End: 2022-09-28

## 2022-03-30 RX ORDER — SCOLOPAMINE TRANSDERMAL SYSTEM 1 MG/1
1 PATCH, EXTENDED RELEASE TRANSDERMAL
Qty: 4 EACH | Refills: 0 | Status: SHIPPED | OUTPATIENT
Start: 2022-03-30 | End: 2022-08-05

## 2022-03-30 RX ORDER — HYDROCHLOROTHIAZIDE 25 MG/1
25 TABLET ORAL DAILY
Qty: 90 TABLET | Refills: 2 | Status: SHIPPED | OUTPATIENT
Start: 2022-03-30 | End: 2022-12-27

## 2022-03-30 RX ORDER — FUROSEMIDE 40 MG/1
40 TABLET ORAL DAILY
COMMUNITY
Start: 2022-03-04

## 2022-03-30 NOTE — PROGRESS NOTES
The ABCs of the Annual Wellness Visit  Welcome to Medicare Visit    Chief Complaint   Patient presents with   • Medicare Wellness-subsequent     Subjective {   History of Present Illness:  Ashley Romero is a 66 y.o. female who presents for a  Welcome to Medicare Visit.    The following portions of the patient's history were reviewed and   updated as appropriate: allergies, current medications, past family history, past medical history, past social history, past surgical history and problem list.     Compared to one year ago, the patient feels her physical   health is better.    Compared to one year ago, the patient feels her mental   health is the same.    Recent Hospitalizations:  She was not admitted to the hospital during the last year.       Current Medical Providers:  Patient Care Team:  Andreia Schaefer MD as PCP - General (Internal Medicine)  Carlyle Vo MD as Consulting Physician (Cardiology)  Anderson Garcia MD as Consulting Physician (Gastroenterology)    Outpatient Medications Prior to Visit   Medication Sig Dispense Refill   • acetaminophen (TYLENOL) 500 MG tablet Take 1,000 mg by mouth Every 6 (Six) Hours As Needed for Mild Pain .     • aspirin 81 MG EC tablet Take 1 tablet by mouth 2 (Two) Times a Day With Meals. 60 tablet 0   • atorvastatin (LIPITOR) 40 MG tablet Take 40 mg by mouth Daily.     • furosemide (LASIX) 40 MG tablet Take 40 mg by mouth Daily.     • losartan (COZAAR) 100 MG tablet TAKE 1 TABLET BY MOUTH DAILY 90 tablet 3   • metoprolol succinate XL (TOPROL-XL) 25 MG 24 hr tablet Take 25 mg by mouth Daily.     • Zinc Sulfate (ZINC 15 PO) Take  by mouth.     • amLODIPine (NORVASC) 5 MG tablet TAKE 1 TABLET BY MOUTH DAILY 90 tablet 1   • hydroCHLOROthiazide (HYDRODIURIL) 25 MG tablet TAKE 1 TABLET BY MOUTH DAILY 90 tablet 2   • meloxicam (MOBIC) 15 MG tablet Take 1 tablet by mouth Daily. 90 tablet 1   • omeprazole (priLOSEC) 40 MG capsule Take 1 capsule by mouth Daily. 90 capsule 3  "  • PARoxetine (PAXIL) 20 MG tablet TAKE 1 TABLET BY MOUTH DAILY 90 tablet 1   • lactated ringers infusion        No facility-administered medications prior to visit.       No opioid medication identified on active medication list. I have reviewed chart for other potential  high risk medication/s and harmful drug interactions in the elderly.          Aspirin is on active medication list. Aspirin use is indicated based on review of current medical condition/s. Pros and cons of this therapy have been discussed today. Benefits of this medication outweigh potential harm.  Patient has been encouraged to continue taking this medication.  .      Patient Active Problem List   Diagnosis   • OA (osteoarthritis) of knee   • Ascending aorta dilatation (HCC)   • Benign essential hypertension   • Hyperlipidemia   • Vasovagal syncope   • Obstructive sleep apnea syndrome   • Owens's esophagus without dysplasia   • Gastroesophageal reflux disease   • Prediabetes   • Gastroparesis   • Encounter for screening for malignant neoplasm of colon   • CAD in native artery     Advance Care Planning  Advance Directive is not on file.  ACP discussion was held with the patient during this visit. Patient does not have an advance directive, declines further assistance.    Review of Systems   Constitutional: Negative for activity change and fatigue.   HENT: Negative for congestion.    Eyes: Negative for visual disturbance.   Respiratory: Negative for cough and shortness of breath.    Cardiovascular: Negative for chest pain and leg swelling.   Gastrointestinal: Negative for abdominal pain.   Genitourinary: Negative for difficulty urinating and pelvic pain.   Musculoskeletal: Negative for arthralgias and back pain.   Psychiatric/Behavioral: Negative for dysphoric mood.        Objective      Vitals:    03/30/22 1320   BP: 124/72   Pulse: 78   Temp: 97.3 °F (36.3 °C)   Weight: 112 kg (248 lb)   Height: 154.9 cm (61\")     BMI Readings from Last 1 " Encounters:   03/30/22 46.86 kg/m²   BMI is above normal parameters. Recommendations include: exercise counseling and nutrition counseling    Does the patient have evidence of cognitive impairment? No    Physical Exam  Constitutional:       Appearance: Normal appearance. She is obese.   Cardiovascular:      Rate and Rhythm: Normal rate and regular rhythm.   Pulmonary:      Effort: Pulmonary effort is normal.      Breath sounds: Normal breath sounds.   Chest:   Breasts:      Right: Normal.      Left: Normal.       Musculoskeletal:      Right lower leg: No edema.      Left lower leg: No edema.   Neurological:      General: No focal deficit present.   Psychiatric:         Mood and Affect: Mood normal.         Thought Content: Thought content normal.         Lab Results   Component Value Date    CHLPL 160 03/23/2022    TRIG 230 (H) 03/23/2022    HDL 38 (L) 03/23/2022    LDL 84 03/23/2022    VLDL 38 03/23/2022       Procedures       HEALTH RISK ASSESSMENT    Smoking Status:  Social History     Tobacco Use   Smoking Status Never Smoker   Smokeless Tobacco Never Used     Alcohol Consumption:  Social History     Substance and Sexual Activity   Alcohol Use Yes    Comment: OCCASIONALLY       Fall Risk Screen:    ADRIEL Fall Risk Assessment was completed, and patient is at LOW risk for falls.Assessment completed on:3/30/2022    Depression Screen:   PHQ-2/PHQ-9 Depression Screening 3/30/2022   Retired Total Score -   Little Interest or Pleasure in Doing Things 0-->not at all   Feeling Down, Depressed or Hopeless 0-->not at all   PHQ-9: Brief Depression Severity Measure Score 0       Health Habits and Functional and Cognitive Screening:  Functional & Cognitive Status 3/30/2022   Do you have difficulty preparing food and eating? No   Do you have difficulty bathing yourself, getting dressed or grooming yourself? No   Do you have difficulty using the toilet? No   Do you have difficulty moving around from place to place? No   Do you  have trouble with steps or getting out of a bed or a chair? No   Current Diet Well Balanced Diet   Dental Exam Up to date   Eye Exam Not up to date   Exercise (times per week) 0 times per week   Current Exercises Include No Regular Exercise   Do you need help using the phone?  No   Are you deaf or do you have serious difficulty hearing?  No   Do you need help with transportation? No   Do you need help shopping? No   Do you need help preparing meals?  No   Do you need help with housework?  No   Do you need help with laundry? No   Do you need help taking your medications? No   Do you need help managing money? No   Do you ever drive or ride in a car without wearing a seat belt? No   Have you felt unusual stress, anger or loneliness in the last month? No   Who do you live with? Spouse   If you need help, do you have trouble finding someone available to you? No   Have you been bothered in the last four weeks by sexual problems? No   Do you have difficulty concentrating, remembering or making decisions? No       Visual Acuity:    No exam data present    Age-appropriate Screening Schedule:  Refer to the list below for future screening recommendations based on patient's age, sex and/or medical conditions. Orders for these recommended tests are listed in the plan section. The patient has been provided with a written plan.    Health Maintenance   Topic Date Due   • DXA SCAN  Never done   • TDAP/TD VACCINES (1 - Tdap) Never done   • ZOSTER VACCINE (2 of 3) 10/04/2016   • INFLUENZA VACCINE  08/01/2022   • PAP SMEAR  03/11/2023   • LIPID PANEL  03/23/2023   • MAMMOGRAM  06/16/2023          Assessment & Plan   CMS Preventative Services Quick Reference  Risk Factors Identified During Encounter  Immunizations Discussed/Encouraged (specific Immunizations; Tdap, Shingrix and COVID19  The above risks/problems have been discussed with the patient.  Pertinent information has been shared with the patient in the After Visit  Summary.  Follow up plans and orders are seen below in the Assessment/Plan Section.    Diagnoses and all orders for this visit:    1. Prediabetes (Primary)  Comments:  stressed weight loss as best way to treat this- will f/u in 6 m  Orders:  -     Comprehensive Metabolic Panel; Future  -     Hemoglobin A1c; Future    2. Other hyperlipidemia  Comments:  LDL 84, would like it lower, will recheck in 6 m,  add med or increase dose if remains elevated.   Orders:  -     Lipid Panel With / Chol / HDL Ratio; Future    3. CAD in native artery  Comments:  as above, stressed lifestyle modifications.     4. Physical exam  Comments:  reviewed immunization recommendations- check dexa.  Recheck in 6m     5. Other specified disorders of bone density and structure, other site  Comments:  check dexa  Orders:  -     DEXA Bone Density Axial    Other orders  -     Scopolamine (Transderm-Scop, 1.5 MG,) 1 MG/3DAYS patch; Place 1 patch on the skin as directed by provider Every 72 (Seventy-Two) Hours.  Dispense: 4 each; Refill: 0        Follow Up:   Return in about 6 months (around 9/30/2022) for Recheck, Lab Before FUP.     An After Visit Summary and PPPS were made available to the patient.

## 2022-03-30 NOTE — PROGRESS NOTES
"The ABCs of the Annual Wellness Visit  Subsequent Medicare Wellness Visit    Chief Complaint   Patient presents with   • Medicare Wellness-subsequent      Subjective    History of Present Illness:  Ashley Romero is a 66 y.o. female who presents for a Subsequent Medicare Wellness Visit.  Cardiac testing done with Dr. Vo- PATRICIA primary complaint- found to have some luminal irregularities.  He treated her with furosemide and she is feeling better.     The following portions of the patient's history were reviewed and   updated as appropriate: {history reviewed:20406::\"allergies\",\"current medications\",\"past family history\",\"past medical history\",\"past social history\",\"past surgical history\",\"problem list\"}.    Compared to one year ago, the patient feels her physical   health is the same.    Compared to one year ago, the patient feels her mental   health is the same.    Recent Hospitalizations:  She was not admitted to the hospital during the last year.       Current Medical Providers:  Patient Care Team:  Andreia Schaefer MD as PCP - General (Internal Medicine)  Carlyle Vo MD as Consulting Physician (Cardiology)  Anderson Garcia MD as Consulting Physician (Gastroenterology)    Outpatient Medications Prior to Visit   Medication Sig Dispense Refill   • acetaminophen (TYLENOL) 500 MG tablet Take 1,000 mg by mouth Every 6 (Six) Hours As Needed for Mild Pain .     • amLODIPine (NORVASC) 5 MG tablet TAKE 1 TABLET BY MOUTH DAILY 90 tablet 1   • aspirin 81 MG EC tablet Take 1 tablet by mouth 2 (Two) Times a Day With Meals. 60 tablet 0   • atorvastatin (LIPITOR) 40 MG tablet Take 40 mg by mouth Daily.     • furosemide (LASIX) 40 MG tablet Take 40 mg by mouth Daily.     • hydroCHLOROthiazide (HYDRODIURIL) 25 MG tablet TAKE 1 TABLET BY MOUTH DAILY 90 tablet 2   • losartan (COZAAR) 100 MG tablet TAKE 1 TABLET BY MOUTH DAILY 90 tablet 3   • meloxicam (MOBIC) 15 MG tablet Take 1 tablet by mouth Daily. 90 tablet 1   • " "metoprolol succinate XL (TOPROL-XL) 25 MG 24 hr tablet Take 25 mg by mouth Daily.     • omeprazole (priLOSEC) 40 MG capsule Take 1 capsule by mouth Daily. 90 capsule 3   • PARoxetine (PAXIL) 20 MG tablet TAKE 1 TABLET BY MOUTH DAILY 90 tablet 1   • Zinc Sulfate (ZINC 15 PO) Take  by mouth.       Facility-Administered Medications Prior to Visit   Medication Dose Route Frequency Provider Last Rate Last Admin   • lactated ringers infusion  30 mL/hr Intravenous Continuous PRN Anderson Garcia MD           No opioid medication identified on active medication list. I have reviewed chart for other potential  high risk medication/s and harmful drug interactions in the elderly.          Aspirin is on active medication list. {ASPIRIN ON MEDICATION LIST INDICATED/NOT INDICATED:45017}.      Patient Active Problem List   Diagnosis   • OA (osteoarthritis) of knee   • Ascending aorta dilatation (HCC)   • Benign essential hypertension   • Hyperlipidemia   • Vasovagal syncope   • Obstructive sleep apnea syndrome   • Owens's esophagus without dysplasia   • Gastroesophageal reflux disease   • Prediabetes   • Gastroparesis   • Encounter for screening for malignant neoplasm of colon   • CAD in native artery     Advance Care Planning  Advance Directive is not on file.  ACP discussion was declined by the patient. Patient does not have an advance directive, information provided.          Objective    Vitals:    03/30/22 1320   Temp: 97.3 °F (36.3 °C)   Weight: 112 kg (248 lb)   Height: 154.9 cm (61\")     BMI Readings from Last 1 Encounters:   03/30/22 46.86 kg/m²   BMI is above normal parameters. Recommendations include: {BMI Follow-up Overweight:36179}    Does the patient have evidence of cognitive impairment? No    Physical Exam  Lab Results   Component Value Date    CHLPL 160 03/23/2022    TRIG 230 (H) 03/23/2022    HDL 38 (L) 03/23/2022    LDL 84 03/23/2022    VLDL 38 03/23/2022            HEALTH RISK ASSESSMENT    Smoking " Status:  Social History     Tobacco Use   Smoking Status Never Smoker   Smokeless Tobacco Never Used     Alcohol Consumption:  Social History     Substance and Sexual Activity   Alcohol Use Yes    Comment: OCCASIONALLY     Fall Risk Screen:    MAURISIOADI Fall Risk Assessment was completed, and patient is at LOW risk for falls.Assessment completed on:3/30/2022    Depression Screening:  PHQ-2/PHQ-9 Depression Screening 3/30/2022   Retired Total Score -   Little Interest or Pleasure in Doing Things 0-->not at all   Feeling Down, Depressed or Hopeless 0-->not at all   PHQ-9: Brief Depression Severity Measure Score 0       Health Habits and Functional and Cognitive Screening:  Functional & Cognitive Status 3/30/2022   Do you have difficulty preparing food and eating? No   Do you have difficulty bathing yourself, getting dressed or grooming yourself? No   Do you have difficulty using the toilet? No   Do you have difficulty moving around from place to place? No   Do you have trouble with steps or getting out of a bed or a chair? No   Current Diet Well Balanced Diet   Dental Exam Up to date   Eye Exam Not up to date   Exercise (times per week) 0 times per week   Current Exercises Include No Regular Exercise   Do you need help using the phone?  No   Are you deaf or do you have serious difficulty hearing?  No   Do you need help with transportation? No   Do you need help shopping? No   Do you need help preparing meals?  No   Do you need help with housework?  No   Do you need help with laundry? No   Do you need help taking your medications? No   Do you need help managing money? No   Do you ever drive or ride in a car without wearing a seat belt? No   Have you felt unusual stress, anger or loneliness in the last month? No   Who do you live with? Spouse   If you need help, do you have trouble finding someone available to you? No   Have you been bothered in the last four weeks by sexual problems? No   Do you have difficulty  concentrating, remembering or making decisions? No       Age-appropriate Screening Schedule:  Refer to the list below for future screening recommendations based on patient's age, sex and/or medical conditions. Orders for these recommended tests are listed in the plan section. The patient has been provided with a written plan.    Health Maintenance   Topic Date Due   • DXA SCAN  Never done   • TDAP/TD VACCINES (1 - Tdap) Never done   • ZOSTER VACCINE (2 of 3) 10/04/2016   • INFLUENZA VACCINE  Never done   • PAP SMEAR  03/11/2023   • LIPID PANEL  03/23/2023   • MAMMOGRAM  06/16/2023              Assessment/Plan   CMS Preventative Services Quick Reference  Risk Factors Identified During Encounter  {Medicare Wellness Risk Factors:19128}  The above risks/problems have been discussed with the patient.  Follow up actions/plans if indicated are seen below in the Assessment/Plan Section.  Pertinent information has been shared with the patient in the After Visit Summary.    There are no diagnoses linked to this encounter.    Follow Up:   No follow-ups on file.     An After Visit Summary and PPPS were made available to the patient.    {Optional Chart Navigation Links Wrapup  Review (Popup)  Advance Care Planning  Labs  CC  Problem List  Visit Diagnosis  Medications  Result Review  Imaging  Health Maintenance  Quality  BestPractice  SmartSets  SnapShot  Encounters  Notes  Media  Procedures :23}    {Time Spent-Use for E/M Coding (Optional):01615}

## 2022-04-21 RX ORDER — OMEPRAZOLE 40 MG/1
40 CAPSULE, DELAYED RELEASE ORAL DAILY
Qty: 90 CAPSULE | Refills: 3 | Status: SHIPPED | OUTPATIENT
Start: 2022-04-21

## 2022-05-03 RX ORDER — PAROXETINE HYDROCHLORIDE 20 MG/1
20 TABLET, FILM COATED ORAL DAILY
Qty: 90 TABLET | Refills: 1 | Status: SHIPPED | OUTPATIENT
Start: 2022-05-03 | End: 2022-10-24

## 2022-08-05 RX ORDER — ROSUVASTATIN CALCIUM 20 MG/1
40 TABLET, COATED ORAL DAILY
COMMUNITY

## 2022-08-05 NOTE — SIGNIFICANT NOTE
Education provided the Patient on the following:    - Nothing to Eat or Drink after MN the night before the procedure    - Avoid red/purple fluids while completing their bowel prep as ordered by physician  -Contact Gastrointerologist office for any questions about specific details regarding colon prep    -You will need to have someone drive you home after your colonoscopy and remain with you for 24 hours after the procedure  - The date of your Surgery, you may have one visitor at bedside or within 10-15 minutes of [facility]  -Please wear warm socks when you arrive for your colonoscopy  -Remove all jewelry and leave any valuables before arriving the day of your procedure (all will have to be removed before leaving preop)  -You will need to arrive at 0945 on 8/10/22 for your colonoscopy    -Feel free to contact us at: [phone] with any additional questions/concerns

## 2022-08-10 ENCOUNTER — HOSPITAL ENCOUNTER (OUTPATIENT)
Facility: SURGERY CENTER | Age: 67
Setting detail: HOSPITAL OUTPATIENT SURGERY
Discharge: HOME | End: 2022-08-10
Attending: INTERNAL MEDICINE | Admitting: INTERNAL MEDICINE

## 2022-08-10 ENCOUNTER — ANESTHESIA (OUTPATIENT)
Dept: SURGERY | Facility: SURGERY CENTER | Age: 67
End: 2022-08-10

## 2022-08-10 ENCOUNTER — ANESTHESIA EVENT (OUTPATIENT)
Dept: SURGERY | Facility: SURGERY CENTER | Age: 67
End: 2022-08-10

## 2022-08-10 VITALS
HEIGHT: 61 IN | TEMPERATURE: 97.7 F | OXYGEN SATURATION: 98 % | HEART RATE: 57 BPM | DIASTOLIC BLOOD PRESSURE: 84 MMHG | WEIGHT: 237.6 LBS | SYSTOLIC BLOOD PRESSURE: 128 MMHG | BODY MASS INDEX: 44.86 KG/M2 | RESPIRATION RATE: 16 BRPM

## 2022-08-10 DIAGNOSIS — Z12.11 ENCOUNTER FOR SCREENING FOR MALIGNANT NEOPLASM OF COLON: ICD-10-CM

## 2022-08-10 PROCEDURE — G0121 COLON CA SCRN NOT HI RSK IND: HCPCS | Performed by: INTERNAL MEDICINE

## 2022-08-10 PROCEDURE — 45378 DIAGNOSTIC COLONOSCOPY: CPT | Performed by: INTERNAL MEDICINE

## 2022-08-10 PROCEDURE — 25010000002 PROPOFOL 10 MG/ML EMULSION: Performed by: ANESTHESIOLOGY

## 2022-08-10 RX ORDER — SODIUM CHLORIDE 0.9 % (FLUSH) 0.9 %
3 SYRINGE (ML) INJECTION EVERY 12 HOURS SCHEDULED
Status: DISCONTINUED | OUTPATIENT
Start: 2022-08-10 | End: 2022-08-10 | Stop reason: HOSPADM

## 2022-08-10 RX ORDER — ONDANSETRON 2 MG/ML
4 INJECTION INTRAMUSCULAR; INTRAVENOUS ONCE AS NEEDED
Status: DISCONTINUED | OUTPATIENT
Start: 2022-08-10 | End: 2022-08-10 | Stop reason: HOSPADM

## 2022-08-10 RX ORDER — PROPOFOL 10 MG/ML
VIAL (ML) INTRAVENOUS AS NEEDED
Status: DISCONTINUED | OUTPATIENT
Start: 2022-08-10 | End: 2022-08-10 | Stop reason: SURG

## 2022-08-10 RX ORDER — SODIUM CHLORIDE, SODIUM LACTATE, POTASSIUM CHLORIDE, CALCIUM CHLORIDE 600; 310; 30; 20 MG/100ML; MG/100ML; MG/100ML; MG/100ML
30 INJECTION, SOLUTION INTRAVENOUS CONTINUOUS PRN
Status: DISCONTINUED | OUTPATIENT
Start: 2022-08-10 | End: 2022-08-10 | Stop reason: HOSPADM

## 2022-08-10 RX ORDER — MAGNESIUM HYDROXIDE 1200 MG/15ML
LIQUID ORAL AS NEEDED
Status: DISCONTINUED | OUTPATIENT
Start: 2022-08-10 | End: 2022-08-10 | Stop reason: HOSPADM

## 2022-08-10 RX ORDER — SODIUM CHLORIDE 0.9 % (FLUSH) 0.9 %
10 SYRINGE (ML) INJECTION AS NEEDED
Status: DISCONTINUED | OUTPATIENT
Start: 2022-08-10 | End: 2022-08-10 | Stop reason: HOSPADM

## 2022-08-10 RX ORDER — PROPOFOL 10 MG/ML
VIAL (ML) INTRAVENOUS CONTINUOUS PRN
Status: DISCONTINUED | OUTPATIENT
Start: 2022-08-10 | End: 2022-08-10 | Stop reason: SURG

## 2022-08-10 RX ORDER — LIDOCAINE HYDROCHLORIDE 20 MG/ML
INJECTION, SOLUTION INFILTRATION; PERINEURAL AS NEEDED
Status: DISCONTINUED | OUTPATIENT
Start: 2022-08-10 | End: 2022-08-10 | Stop reason: SURG

## 2022-08-10 RX ADMIN — SODIUM CHLORIDE, POTASSIUM CHLORIDE, SODIUM LACTATE AND CALCIUM CHLORIDE 30 ML/HR: 600; 310; 30; 20 INJECTION, SOLUTION INTRAVENOUS at 10:14

## 2022-08-10 RX ADMIN — LIDOCAINE HYDROCHLORIDE 50 MG: 20 INJECTION, SOLUTION INFILTRATION; PERINEURAL at 10:52

## 2022-08-10 RX ADMIN — PROPOFOL INJECTABLE EMULSION 100 MG: 10 INJECTION, EMULSION INTRAVENOUS at 10:52

## 2022-08-10 RX ADMIN — Medication 180 MCG/KG/MIN: at 10:52

## 2022-08-10 NOTE — ANESTHESIA POSTPROCEDURE EVALUATION
"Patient: Ashley Romero    Procedure Summary     Date: 08/10/22 Room / Location: [facility]    Anesthesia Start: 1047 Anesthesia Stop: 1119    Procedure: COLONOSCOPY (N/A ) Diagnosis:       Encounter for screening for malignant neoplasm of colon      (Encounter for screening for malignant neoplasm of colon [Z12.11])    Surgeons: Anderson Garcia MD Provider: Nikki Theodore MD    Anesthesia Type: MAC ASA Status: 3          Anesthesia Type: MAC    Vitals  Vitals Value Taken Time   /84 08/10/22 1139   Temp 36.5 °C (97.7 °F) 08/10/22 1120   Pulse 57 08/10/22 1139   Resp 16 08/10/22 1139   SpO2 98 % 08/10/22 1139           Post Anesthesia Care and Evaluation    Patient location during evaluation: PHASE II  Patient participation: complete - patient participated  Level of consciousness: awake  Pain management: adequate    Airway patency: patent  Anesthetic complications: No anesthetic complications    Cardiovascular status: acceptable  Respiratory status: acceptable  Hydration status: acceptable    Comments: /84   Pulse 57   Temp 36.5 °C (97.7 °F)   Resp 16   Ht 154.9 cm (61\")   Wt 108 kg (237 lb 9.6 oz)   SpO2 98%   BMI 44.89 kg/m²       "

## 2022-08-10 NOTE — ANESTHESIA PREPROCEDURE EVALUATION
Anesthesia Evaluation     Patient summary reviewed and Nursing notes reviewed   NPO Solid Status: > 8 hours  NPO Liquid Status: > 2 hours           Airway   Mallampati: III  Possible difficult intubation  Dental          Pulmonary - normal exam   (+) sleep apnea on CPAP,   Cardiovascular - normal exam    ECG reviewed  Patient on routine beta blocker    (+) hypertension, CAD, PVD, hyperlipidemia,     ROS comment: Reviewed stress test,echo and CTA    Neuro/Psych  (+) psychiatric history Depression,    GI/Hepatic/Renal/Endo    (+) morbid obesity, GERD,  liver disease fatty liver disease,     Musculoskeletal     Abdominal   (+) obese,    Substance History      OB/GYN          Other   arthritis,                    Anesthesia Plan    ASA 3     MAC       Anesthetic plan, risks, benefits, and alternatives have been provided, discussed and informed consent has been obtained with: patient.        CODE STATUS:

## 2022-09-28 RX ORDER — MELOXICAM 15 MG/1
15 TABLET ORAL DAILY
Qty: 90 TABLET | Refills: 1 | Status: SHIPPED | OUTPATIENT
Start: 2022-09-28 | End: 2023-03-27

## 2022-09-28 RX ORDER — AMLODIPINE BESYLATE 5 MG/1
5 TABLET ORAL DAILY
Qty: 90 TABLET | Refills: 1 | Status: SHIPPED | OUTPATIENT
Start: 2022-09-28 | End: 2023-03-27

## 2022-10-03 ENCOUNTER — OFFICE VISIT (OUTPATIENT)
Dept: INTERNAL MEDICINE | Facility: CLINIC | Age: 67
End: 2022-10-03

## 2022-10-03 VITALS
HEIGHT: 61 IN | DIASTOLIC BLOOD PRESSURE: 72 MMHG | WEIGHT: 234 LBS | HEART RATE: 76 BPM | SYSTOLIC BLOOD PRESSURE: 130 MMHG | BODY MASS INDEX: 44.18 KG/M2 | TEMPERATURE: 97.3 F

## 2022-10-03 DIAGNOSIS — I10 BENIGN ESSENTIAL HYPERTENSION: Primary | ICD-10-CM

## 2022-10-03 DIAGNOSIS — Z23 NEED FOR PNEUMOCOCCAL VACCINATION: ICD-10-CM

## 2022-10-03 DIAGNOSIS — E78.49 OTHER HYPERLIPIDEMIA: ICD-10-CM

## 2022-10-03 DIAGNOSIS — E11.9 CONTROLLED TYPE 2 DIABETES MELLITUS WITHOUT COMPLICATION, WITHOUT LONG-TERM CURRENT USE OF INSULIN: ICD-10-CM

## 2022-10-03 DIAGNOSIS — G47.33 OBSTRUCTIVE SLEEP APNEA SYNDROME: ICD-10-CM

## 2022-10-03 PROCEDURE — 90677 PCV20 VACCINE IM: CPT | Performed by: INTERNAL MEDICINE

## 2022-10-03 PROCEDURE — 99214 OFFICE O/P EST MOD 30 MIN: CPT | Performed by: INTERNAL MEDICINE

## 2022-10-03 PROCEDURE — 90471 IMMUNIZATION ADMIN: CPT | Performed by: INTERNAL MEDICINE

## 2022-10-03 NOTE — PROGRESS NOTES
"Chief Complaint  Hypertension    Subjective        Ashley Romero presents to [facility]  History of Present Illness  Has been off sugar since August- weight down since March.  She does not check BS.  Reviewed labs- A1C down.  She has not added exercise.    Using CPAP nightly.  Does not want a flu shot- \"don't know why, I just don't\"    Objective   Vital Signs:  /72   Pulse 76   Temp 97.3 °F (36.3 °C)   Ht 154.9 cm (61\")   Wt 106 kg (234 lb)   BMI 44.21 kg/m²   Estimated body mass index is 44.21 kg/m² as calculated from the following:    Height as of this encounter: 154.9 cm (61\").    Weight as of this encounter: 106 kg (234 lb).          Physical Exam  Constitutional:       Appearance: She is obese.   Cardiovascular:      Rate and Rhythm: Normal rate and regular rhythm.   Musculoskeletal:      Right lower leg: No edema.      Left lower leg: No edema.        Result Review :                Assessment and Plan   Diagnoses and all orders for this visit:    1. Benign essential hypertension (Primary)  Comments:  well controlled, no change.     2. Other hyperlipidemia  Comments:  doing great with dietary changes and Crestor    3. Controlled type 2 diabetes mellitus without complication, without long-term current use of insulin (HCC)  Comments:  A1C much better with dietary changes.  Add exercise.   Orders:  -     Comprehensive Metabolic Panel; Future  -     Hemoglobin A1c; Future  -     Lipid Panel With / Chol / HDL Ratio; Future  -     Microalbumin / Creatinine Urine Ratio - Urine, Clean Catch; Future    4. Obstructive sleep apnea syndrome  Comments:  controlled with nightly use.     5. Need for pneumococcal vaccination  -     Pneumococcal Conjugate Vaccine 20-Valent (PCV20)             Follow Up   Return in about 6 months (around 4/3/2023) for Annual physical, Lab Before FUP.  Patient was given instructions and counseling regarding her condition or for health maintenance " advice. Please see specific information pulled into the AVS if appropriate.

## 2022-10-24 RX ORDER — LOSARTAN POTASSIUM 100 MG/1
100 TABLET ORAL DAILY
Qty: 90 TABLET | Refills: 3 | Status: SHIPPED | OUTPATIENT
Start: 2022-10-24

## 2022-10-24 RX ORDER — PAROXETINE HYDROCHLORIDE 20 MG/1
20 TABLET, FILM COATED ORAL DAILY
Qty: 90 TABLET | Refills: 1 | Status: SHIPPED | OUTPATIENT
Start: 2022-10-24

## 2022-10-26 ENCOUNTER — APPOINTMENT (OUTPATIENT)
Dept: BONE DENSITY | Facility: HOSPITAL | Age: 67
End: 2022-10-26

## 2022-10-27 ENCOUNTER — APPOINTMENT (OUTPATIENT)
Dept: BONE DENSITY | Facility: HOSPITAL | Age: 67
End: 2022-10-27

## 2022-11-17 ENCOUNTER — HOSPITAL ENCOUNTER (OUTPATIENT)
Dept: BONE DENSITY | Facility: HOSPITAL | Age: 67
Discharge: HOME | End: 2022-11-17
Admitting: INTERNAL MEDICINE

## 2022-11-17 PROCEDURE — 77080 DXA BONE DENSITY AXIAL: CPT

## 2022-12-27 RX ORDER — HYDROCHLOROTHIAZIDE 25 MG/1
25 TABLET ORAL DAILY
Qty: 90 TABLET | Refills: 2 | Status: SHIPPED | OUTPATIENT
Start: 2022-12-27

## 2023-03-27 RX ORDER — MELOXICAM 15 MG/1
15 TABLET ORAL DAILY
Qty: 90 TABLET | Refills: 1 | Status: SHIPPED | OUTPATIENT
Start: 2023-03-27

## 2023-03-27 RX ORDER — AMLODIPINE BESYLATE 5 MG/1
5 TABLET ORAL DAILY
Qty: 90 TABLET | Refills: 1 | Status: SHIPPED | OUTPATIENT
Start: 2023-03-27

## 2023-04-17 ENCOUNTER — OFFICE VISIT (OUTPATIENT)
Dept: INTERNAL MEDICINE | Facility: CLINIC | Age: 68
End: 2023-04-17
Payer: COMMERCIAL

## 2023-04-17 VITALS
WEIGHT: 233 LBS | HEART RATE: 72 BPM | HEIGHT: 61 IN | DIASTOLIC BLOOD PRESSURE: 74 MMHG | BODY MASS INDEX: 43.99 KG/M2 | SYSTOLIC BLOOD PRESSURE: 130 MMHG

## 2023-04-17 DIAGNOSIS — Z12.31 VISIT FOR SCREENING MAMMOGRAM: ICD-10-CM

## 2023-04-17 DIAGNOSIS — E78.49 OTHER HYPERLIPIDEMIA: ICD-10-CM

## 2023-04-17 DIAGNOSIS — I10 BENIGN ESSENTIAL HYPERTENSION: ICD-10-CM

## 2023-04-17 DIAGNOSIS — E11.9 CONTROLLED TYPE 2 DIABETES MELLITUS WITHOUT COMPLICATION, WITHOUT LONG-TERM CURRENT USE OF INSULIN: ICD-10-CM

## 2023-04-17 DIAGNOSIS — Z01.419 ENCOUNTER FOR ANNUAL ROUTINE GYNECOLOGICAL EXAMINATION: Primary | ICD-10-CM

## 2023-04-17 DIAGNOSIS — Z00.00 PHYSICAL EXAM, ANNUAL: ICD-10-CM

## 2023-04-17 RX ORDER — KETOCONAZOLE 20 MG/G
CREAM TOPICAL
COMMUNITY
Start: 2023-01-16 | End: 2023-04-17

## 2023-04-17 NOTE — PROGRESS NOTES
Subjective   Ashley Romero is a 67 y.o. female and is here for a comprehensive physical exam. The patient reports no problems.  She has not done anything different for her health- does take her medications.   Pt is here for gyn exam as well.   Due for DM eye exam.         Social History     Socioeconomic History   • Marital status:    Tobacco Use   • Smoking status: Never   • Smokeless tobacco: Never   Vaping Use   • Vaping Use: Never used   Substance and Sexual Activity   • Alcohol use: Yes     Comment: OCCASIONALLY   • Drug use: No   • Sexual activity: Defer       Family History   Problem Relation Age of Onset   • Diabetes Mother    • Graves' disease Mother    • Heart disease Father    • Colonic polyp Maternal Grandfather    • Colon polyps Maternal Grandfather    • Breast cancer Maternal Aunt    • Malig Hyperthermia Neg Hx    • Crohn's disease Neg Hx    • Colon cancer Neg Hx    • Irritable bowel syndrome Neg Hx    • Ulcerative colitis Neg Hx           Past Medical History:   Diagnosis Date   • Arthritis    • Owens esophagus    • Benign essential hypertension    • Depression    • Family history of colonic polyps    • GERD (gastroesophageal reflux disease)    • History of ulcer disease    • Hyperglycemia    • Hyperlipidemia    • Nonalcoholic fatty liver disease    • Sleep apnea     CPAP   • Viral illness    • Yeast infection           Current Outpatient Medications:   •  acetaminophen (TYLENOL) 500 MG tablet, Take 2 tablets by mouth Every 6 (Six) Hours As Needed for Mild Pain., Disp: , Rfl:   •  amLODIPine (NORVASC) 5 MG tablet, TAKE 1 TABLET BY MOUTH DAILY, Disp: 90 tablet, Rfl: 1  •  aspirin 81 MG EC tablet, Take 1 tablet by mouth 2 (Two) Times a Day With Meals., Disp: 60 tablet, Rfl: 0  •  furosemide (LASIX) 40 MG tablet, Take 1 tablet by mouth Daily., Disp: , Rfl:   •  hydroCHLOROthiazide (HYDRODIURIL) 25 MG tablet, TAKE 1 TABLET BY MOUTH DAILY, Disp: 90 tablet, Rfl: 2  •  losartan (COZAAR) 100 MG  "tablet, TAKE 1 TABLET BY MOUTH DAILY, Disp: 90 tablet, Rfl: 3  •  meloxicam (MOBIC) 15 MG tablet, TAKE 1 TABLET BY MOUTH DAILY, Disp: 90 tablet, Rfl: 1  •  metoprolol succinate XL (TOPROL-XL) 25 MG 24 hr tablet, Take 1 tablet by mouth Daily., Disp: , Rfl:   •  omeprazole (priLOSEC) 40 MG capsule, Take 1 capsule by mouth Daily., Disp: 90 capsule, Rfl: 3  •  PARoxetine (PAXIL) 20 MG tablet, TAKE 1 TABLET BY MOUTH DAILY, Disp: 90 tablet, Rfl: 1  •  rosuvastatin (CRESTOR) 20 MG tablet, Take 2 tablets by mouth Daily., Disp: , Rfl:   •  Zinc Sulfate (ZINC 15 PO), Take  by mouth., Disp: , Rfl:     Review of Systems    Review of Systems   Constitutional: Negative for fatigue and unexpected weight loss.   Eyes: Negative for visual disturbance.   Respiratory: Negative for shortness of breath.    Cardiovascular: Negative for chest pain and palpitations.   Gastrointestinal: Negative for abdominal pain and blood in stool.   Endocrine: Negative for cold intolerance.   Genitourinary: Negative for breast lump, pelvic pressure and urinary incontinence.   Musculoskeletal: Negative for arthralgias and gait problem.   Neurological: Negative for memory problem.   Psychiatric/Behavioral: Negative for depressed mood.        There were no vitals filed for this visit.    Vitals:    04/17/23 1525   BP: 130/74   Pulse: 72   Weight: 106 kg (233 lb)   Height: 154.9 cm (61\")     Body mass index is 44.02 kg/m².  Wt Readings from Last 3 Encounters:   04/17/23 106 kg (233 lb)   10/03/22 106 kg (234 lb)   08/10/22 108 kg (237 lb 9.6 oz)      Objective     Physical Exam  Exam conducted with a chaperone present.   Constitutional:       General: She is not in acute distress.     Appearance: She is well-developed.   Eyes:      Conjunctiva/sclera: Conjunctivae normal.   Neck:      Thyroid: No thyromegaly.   Cardiovascular:      Rate and Rhythm: Normal rate and regular rhythm.      Pulses:           Dorsalis pedis pulses are 1+ on the right side and 1+ on " the left side.        Posterior tibial pulses are 1+ on the right side and 1+ on the left side.      Heart sounds: Normal heart sounds.   Pulmonary:      Effort: Pulmonary effort is normal.      Breath sounds: Normal breath sounds.   Chest:   Breasts:     Right: Normal.      Left: Normal.   Abdominal:      General: Bowel sounds are normal.      Palpations: Abdomen is soft.   Genitourinary:     General: Normal vulva.      Vagina: Normal.      Cervix: Friability present.      Uterus: Normal.       Adnexa: Right adnexa normal and left adnexa normal.   Feet:      Right foot:      Skin integrity: Skin integrity normal.      Left foot:      Skin integrity: Skin integrity normal.      Comments: Monofilament Test Performed  Diabetic Foot Exam Performed    Lymphadenopathy:      Cervical: No cervical adenopathy.   Skin:     General: Skin is warm and dry.   Neurological:      Mental Status: She is alert and oriented to person, place, and time.   Psychiatric:         Behavior: Behavior normal.         Thought Content: Thought content normal.         Judgment: Judgment normal.         Procedures       Assessment & Plan         Diagnoses and all orders for this visit:    1. Encounter for annual routine gynecological examination (Primary)  -     IGP,rfx Aptima HPV All Pth    2. Controlled type 2 diabetes mellitus without complication, without long-term current use of insulin  Comments:  A1C unchanged @ 6.5%- discussed weight, dietary changes and ways to avoid more medication.   Orders:  -     Comprehensive Metabolic Panel; Future  -     Hemoglobin A1c; Future    3. Other hyperlipidemia  Comments:  at goal.   Orders:  -     Lipid Panel With / Chol / HDL Ratio; Future    4. Visit for screening mammogram  -     Mammo Screening Bilateral With CAD    5. Benign essential hypertension  Comments:  controlled, no changes    6. Physical exam, annual  Comments:  stressed importance of taking control of her health on her own- not just with  meds- to get shingrix vaccine as well.         Return in about 6 months (around 10/17/2023) for Recheck, Lab Before FUP.

## 2023-04-24 LAB
CONV .: NORMAL
CYTOLOGIST CVX/VAG CYTO: NORMAL
CYTOLOGY CVX/VAG DOC CYTO: NORMAL
CYTOLOGY CVX/VAG DOC THIN PREP: NORMAL
DX ICD CODE: NORMAL
HIV 1 & 2 AB SER-IMP: NORMAL
Lab: NORMAL
OTHER STN SPEC: NORMAL
STAT OF ADQ CVX/VAG CYTO-IMP: NORMAL

## 2023-04-24 RX ORDER — PAROXETINE HYDROCHLORIDE 20 MG/1
20 TABLET, FILM COATED ORAL DAILY
Qty: 90 TABLET | Refills: 1 | Status: SHIPPED | OUTPATIENT
Start: 2023-04-24

## 2023-04-24 RX ORDER — OMEPRAZOLE 40 MG/1
40 CAPSULE, DELAYED RELEASE ORAL DAILY
Qty: 90 CAPSULE | Refills: 1 | Status: SHIPPED | OUTPATIENT
Start: 2023-04-24

## 2023-04-28 ENCOUNTER — TRANSCRIBE ORDERS (OUTPATIENT)
Dept: ADMINISTRATIVE | Facility: HOSPITAL | Age: 68
End: 2023-04-28
Payer: MEDICARE

## 2023-04-28 DIAGNOSIS — Z12.31 VISIT FOR SCREENING MAMMOGRAM: Primary | ICD-10-CM

## 2023-05-04 ENCOUNTER — HOSPITAL ENCOUNTER (OUTPATIENT)
Dept: MAMMOGRAPHY | Facility: HOSPITAL | Age: 68
Discharge: HOME | End: 2023-05-04
Admitting: INTERNAL MEDICINE
Payer: MEDICARE

## 2023-05-04 DIAGNOSIS — Z12.31 VISIT FOR SCREENING MAMMOGRAM: ICD-10-CM

## 2023-05-04 PROCEDURE — 77067 SCR MAMMO BI INCL CAD: CPT

## 2023-05-04 PROCEDURE — 77063 BREAST TOMOSYNTHESIS BI: CPT

## 2023-09-27 RX ORDER — MELOXICAM 15 MG/1
15 TABLET ORAL DAILY
Qty: 90 TABLET | Refills: 0 | Status: SHIPPED | OUTPATIENT
Start: 2023-09-27

## 2023-10-18 ENCOUNTER — OFFICE VISIT (OUTPATIENT)
Dept: INTERNAL MEDICINE | Facility: CLINIC | Age: 68
End: 2023-10-18
Payer: MEDICARE

## 2023-10-18 VITALS
HEIGHT: 61 IN | BODY MASS INDEX: 44.75 KG/M2 | HEART RATE: 78 BPM | WEIGHT: 237 LBS | SYSTOLIC BLOOD PRESSURE: 148 MMHG | DIASTOLIC BLOOD PRESSURE: 82 MMHG

## 2023-10-18 DIAGNOSIS — I77.810 ASCENDING AORTA DILATATION: Primary | Chronic | ICD-10-CM

## 2023-10-18 DIAGNOSIS — I10 BENIGN ESSENTIAL HYPERTENSION: Chronic | ICD-10-CM

## 2023-10-18 DIAGNOSIS — E66.01 CLASS 3 SEVERE OBESITY WITH SERIOUS COMORBIDITY AND BODY MASS INDEX (BMI) OF 40.0 TO 44.9 IN ADULT, UNSPECIFIED OBESITY TYPE: ICD-10-CM

## 2023-10-18 DIAGNOSIS — E11.65 TYPE 2 DIABETES MELLITUS WITH HYPERGLYCEMIA, WITHOUT LONG-TERM CURRENT USE OF INSULIN: Chronic | ICD-10-CM

## 2023-10-18 PROCEDURE — 1159F MED LIST DOCD IN RCRD: CPT | Performed by: INTERNAL MEDICINE

## 2023-10-18 PROCEDURE — 3077F SYST BP >= 140 MM HG: CPT | Performed by: INTERNAL MEDICINE

## 2023-10-18 PROCEDURE — 99214 OFFICE O/P EST MOD 30 MIN: CPT | Performed by: INTERNAL MEDICINE

## 2023-10-18 PROCEDURE — 1160F RVW MEDS BY RX/DR IN RCRD: CPT | Performed by: INTERNAL MEDICINE

## 2023-10-18 PROCEDURE — 3051F HG A1C>EQUAL 7.0%<8.0%: CPT | Performed by: INTERNAL MEDICINE

## 2023-10-18 PROCEDURE — 3079F DIAST BP 80-89 MM HG: CPT | Performed by: INTERNAL MEDICINE

## 2023-10-18 RX ORDER — OMEPRAZOLE 40 MG/1
40 CAPSULE, DELAYED RELEASE ORAL DAILY
Qty: 90 CAPSULE | Refills: 1 | OUTPATIENT
Start: 2023-10-18

## 2023-10-18 RX ORDER — ROSUVASTATIN CALCIUM 40 MG/1
40 TABLET, COATED ORAL DAILY
Status: SHIPPED | COMMUNITY
Start: 2023-10-18

## 2023-10-18 RX ORDER — PAROXETINE HYDROCHLORIDE 20 MG/1
20 TABLET, FILM COATED ORAL DAILY
Qty: 90 TABLET | Refills: 1 | Status: SHIPPED | OUTPATIENT
Start: 2023-10-18

## 2023-10-18 RX ORDER — MELATONIN
1000 DAILY
COMMUNITY

## 2023-10-18 NOTE — PROGRESS NOTES
"Chief Complaint  Diabetes and Hypertension    Subjective        Ashley Romero presents to [facility]  History of Present Illness here for reg f/u- has not been eating well- no excuses.  Says she's addicted to sugar.   DM eye exam recently negative.   Refuses flu shot.   Not exercising. Using CPAP nightly.  She and  do a rock bouncer-     Objective   Vital Signs:  /82   Pulse 78   Ht 154.9 cm (61\")   Wt 108 kg (237 lb)   BMI 44.78 kg/m²   Estimated body mass index is 44.78 kg/m² as calculated from the following:    Height as of this encounter: 154.9 cm (61\").    Weight as of this encounter: 108 kg (237 lb).       Class 3 Severe Obesity (BMI >=40). Obesity-related health conditions include the following: obstructive sleep apnea, hypertension, diabetes mellitus, dyslipidemias, and osteoarthritis. Obesity is worsening. BMI is is above average; BMI management plan is completed. We discussed portion control and increasing exercise.      Physical Exam  Constitutional:       Appearance: Normal appearance.   Cardiovascular:      Rate and Rhythm: Normal rate.   Pulmonary:      Effort: Pulmonary effort is normal.   Skin:     General: Skin is warm and dry.        Result Review :  The following data was reviewed by: Andreia Schaefer MD on 10/18/2023:  Common labs          4/10/2023    10:57 10/11/2023    10:47   Common Labs   Glucose 146  197    BUN 16  25    Creatinine 0.81  0.88    Sodium 139  137    Potassium 3.7  3.5    Chloride 97  92    Calcium 10.1  10.0    Total Protein 7.1  7.8    Albumin 4.8  4.9    Total Bilirubin 0.7  0.7    Alkaline Phosphatase 73  84    AST (SGOT) 25  30    ALT (SGPT) 27  23    Total Cholesterol 138  124    Triglycerides 235  264    HDL Cholesterol 43  34    LDL Cholesterol  57  49    Hemoglobin A1C 6.50  7.9    Microalbumin, Urine 103.7                    Assessment and Plan   Diagnoses and all orders for this visit:    1. Ascending aorta dilatation " (Primary)  Comments:  has good f/u with cardiology    2. Type 2 diabetes mellitus with hyperglycemia, without long-term current use of insulin  Comments:  does not want meds- discussed Sugar Busters at length- recheck 3-4m  Orders:  -     Comprehensive Metabolic Panel; Future  -     Hemoglobin A1c; Future    3. Benign essential hypertension  Comments:  a bit higher than her normal today- would check at home and recheck here at /fu    4. Class 3 severe obesity with serious comorbidity and body mass index (BMI) of 40.0 to 44.9 in adult, unspecified obesity type  Comments:  stressed need to lose weight- concerned about [place], etc. work on daily walk, watch calories and tariq sugar intake.             Follow Up   Return in about 4 months (around 2/18/2024) for Lab Before FUP.  Patient was given instructions and counseling regarding her condition or for health maintenance advice. Please see specific information pulled into the AVS if appropriate.

## 2023-10-23 NOTE — PROGRESS NOTES
"Chief Complaint   Patient presents with    Follow-up     Fu gerd and barretts, gastroparesis         History of Present Illness    Patient is a 68 y.o. who presents to the office for follow up evaluation.  Last in office visit was on  12/2021  . Patient has a significant past medical history of gerd and barretts, gastroparesis.      For GERD, she is currently taking omeprazole 40 mg once daily prior to breakfast.  Describes symptoms as overall well controlled without heartburn, nausea, vomiting, or dysphagia.     Bowel habits are occurring regularly without melena or hematochezia.         Review of Systems     Result Review :       COLONOSCOPY (08/10/2022 10:41) normal, 7  UPPER GI ENDOSCOPY (06/03/2021 07:21) SSBE  Vital Signs:   /80   Pulse 77   Temp 97 °F (36.1 °C)   Ht 154.9 cm (60.98\")   Wt 108 kg (237 lb 8 oz)   SpO2 95%   BMI 44.90 kg/m²     Body mass index is 44.9 kg/m².     Physical Exam  Constitutional:       General: She is not in acute distress.     Appearance: Normal appearance. She is well-developed. She is not ill-appearing.   Eyes:      General: No scleral icterus.  Pulmonary:      Effort: No respiratory distress.   Skin:     Coloration: Skin is not jaundiced or pale.   Neurological:      Mental Status: She is alert and oriented to person, place, and time.   Psychiatric:         Mood and Affect: Mood normal.         Behavior: Behavior normal.         Thought Content: Thought content normal.         Judgment: Judgment normal.           Assessment and Plan    Diagnoses and all orders for this visit:    1. Owens's esophagus without dysplasia (Primary)    2. Encounter for screening for malignant neoplasm of colon    3. Gastroesophageal reflux disease without esophagitis    4. Gastroparesis             I have reviewed and confirmed the accuracy of the HPI and Assessment and Plan as documented by the FÉLIX Motley        Follow Up   No follow-ups on file.    Patient Instructions "   For surveillance of Owens's esophagus, EGD recommended at 3-year interval, due June 2024.    For gastroparesis, follow a gastroparesis diet. Eat smaller more frequent meals as opposed to large meals and foods lower in fat and fiber.    For colon cancer screening, colonoscopy recommended August 2032.

## 2023-10-25 ENCOUNTER — PREP FOR SURGERY (OUTPATIENT)
Dept: SURGERY | Facility: SURGERY CENTER | Age: 68
End: 2023-10-25
Payer: MEDICARE

## 2023-10-25 ENCOUNTER — OFFICE VISIT (OUTPATIENT)
Dept: GASTROENTEROLOGY | Facility: CLINIC | Age: 68
End: 2023-10-25
Payer: MEDICARE

## 2023-10-25 VITALS
OXYGEN SATURATION: 95 % | HEIGHT: 61 IN | BODY MASS INDEX: 44.84 KG/M2 | DIASTOLIC BLOOD PRESSURE: 80 MMHG | WEIGHT: 237.5 LBS | TEMPERATURE: 97 F | SYSTOLIC BLOOD PRESSURE: 120 MMHG | HEART RATE: 77 BPM

## 2023-10-25 DIAGNOSIS — K31.84 GASTROPARESIS: ICD-10-CM

## 2023-10-25 DIAGNOSIS — K22.70 BARRETT'S ESOPHAGUS WITHOUT DYSPLASIA: Primary | ICD-10-CM

## 2023-10-25 DIAGNOSIS — K21.9 GASTROESOPHAGEAL REFLUX DISEASE WITHOUT ESOPHAGITIS: ICD-10-CM

## 2023-10-25 DIAGNOSIS — Z12.11 ENCOUNTER FOR SCREENING FOR MALIGNANT NEOPLASM OF COLON: ICD-10-CM

## 2023-10-25 RX ORDER — SODIUM CHLORIDE 0.9 % (FLUSH) 0.9 %
10 SYRINGE (ML) INJECTION AS NEEDED
OUTPATIENT
Start: 2023-10-25

## 2023-10-25 RX ORDER — AMOXICILLIN 500 MG/1
TABLET, FILM COATED ORAL
COMMUNITY
Start: 2023-10-23

## 2023-10-25 RX ORDER — TRAMADOL HYDROCHLORIDE 50 MG/1
1 TABLET ORAL EVERY 6 HOURS
COMMUNITY
Start: 2023-10-23

## 2023-10-25 RX ORDER — SODIUM CHLORIDE 0.9 % (FLUSH) 0.9 %
3 SYRINGE (ML) INJECTION EVERY 12 HOURS SCHEDULED
OUTPATIENT
Start: 2023-10-25

## 2023-10-25 RX ORDER — IBUPROFEN 800 MG/1
TABLET ORAL
COMMUNITY
Start: 2023-10-23

## 2023-10-25 RX ORDER — SODIUM CHLORIDE, SODIUM LACTATE, POTASSIUM CHLORIDE, CALCIUM CHLORIDE 600; 310; 30; 20 MG/100ML; MG/100ML; MG/100ML; MG/100ML
30 INJECTION, SOLUTION INTRAVENOUS CONTINUOUS PRN
OUTPATIENT
Start: 2023-10-25

## 2023-10-25 NOTE — PATIENT INSTRUCTIONS
For surveillance of Owens's esophagus, EGD recommended at 3-year interval, due June 2024.    For gastroparesis, follow a gastroparesis diet. Eat smaller more frequent meals as opposed to large meals and foods lower in fat and fiber.    For colon cancer screening, colonoscopy recommended August 2032.

## 2024-01-01 NOTE — H&P
No chief complaint on file.      HPI  screening         Problem List:    Patient Active Problem List   Diagnosis   • OA (osteoarthritis) of knee   • Ascending aorta dilatation (HCC)   • Benign essential hypertension   • Hyperlipidemia   • Vasovagal syncope   • Obstructive sleep apnea syndrome   • Owens's esophagus without dysplasia   • Gastroesophageal reflux disease   • Prediabetes   • Gastroparesis   • Encounter for screening for malignant neoplasm of colon   • CAD in native artery       Medical History:    Past Medical History:   Diagnosis Date   • Arthritis    • Owens esophagus    • Benign essential hypertension    • Depression    • Family history of colonic polyps    • GERD (gastroesophageal reflux disease)    • History of ulcer disease    • Hyperglycemia    • Hyperlipidemia    • Nonalcoholic fatty liver disease    • Sleep apnea     CPAP   • Viral illness    • Yeast infection         Social History:    Social History     Socioeconomic History   • Marital status:    Tobacco Use   • Smoking status: Never Smoker   • Smokeless tobacco: Never Used   Vaping Use   • Vaping Use: Never used   Substance and Sexual Activity   • Alcohol use: Yes     Comment: OCCASIONALLY   • Drug use: No   • Sexual activity: Defer       Family History:   Family History   Problem Relation Age of Onset   • Diabetes Mother    • Graves' disease Mother    • Heart disease Father    • Colonic polyp Maternal Grandfather    • Colon polyps Maternal Grandfather    • Breast cancer Maternal Aunt    • Malig Hyperthermia Neg Hx    • Crohn's disease Neg Hx    • Colon cancer Neg Hx    • Irritable bowel syndrome Neg Hx    • Ulcerative colitis Neg Hx        Surgical History:   Past Surgical History:   Procedure Laterality Date   • COLONOSCOPY  2005    Dr Garcia   • ENDOSCOPY     • ENDOSCOPY Left 6/3/2021    Procedure: ESOPHAGOGASTRODUODENOSCOPY;  Surgeon: Anderson Garcia MD;  Location: [facility] OR;  Service: Gastroenterology;  Laterality:  Left;   • TONSILLECTOMY     • TOTAL KNEE ARTHROPLASTY Right 3/14/2019    Procedure: RIGHT TOTAL KNEE ARTHROPLASTY;  Surgeon: Estevan Esquivel MD;  Location: [facility] MAIN OR;  Service: Orthopedics   • TOTAL KNEE ARTHROPLASTY Right 03/2019   • TOTAL KNEE ARTHROPLASTY Left 8/15/2019    Procedure: TOTAL KNEE ARTHROPLASTY;  Surgeon: Estevan Esquivel MD;  Location: [facility] MAIN OR;  Service: Orthopedics   • TUBAL ABDOMINAL LIGATION     • UPPER GASTROINTESTINAL ENDOSCOPY         No current facility-administered medications for this encounter.    Current Outpatient Medications:   •  acetaminophen (TYLENOL) 500 MG tablet, Take 1,000 mg by mouth Every 6 (Six) Hours As Needed for Mild Pain ., Disp: , Rfl:   •  amLODIPine (NORVASC) 5 MG tablet, TAKE 1 TABLET BY MOUTH DAILY, Disp: 90 tablet, Rfl: 1  •  aspirin 81 MG EC tablet, Take 1 tablet by mouth 2 (Two) Times a Day With Meals., Disp: 60 tablet, Rfl: 0  •  furosemide (LASIX) 40 MG tablet, Take 40 mg by mouth Daily., Disp: , Rfl:   •  hydroCHLOROthiazide (HYDRODIURIL) 25 MG tablet, TAKE 1 TABLET BY MOUTH DAILY, Disp: 90 tablet, Rfl: 2  •  losartan (COZAAR) 100 MG tablet, TAKE 1 TABLET BY MOUTH DAILY, Disp: 90 tablet, Rfl: 3  •  meloxicam (MOBIC) 15 MG tablet, TAKE 1 TABLET BY MOUTH DAILY, Disp: 90 tablet, Rfl: 1  •  metoprolol succinate XL (TOPROL-XL) 25 MG 24 hr tablet, Take 25 mg by mouth Daily., Disp: , Rfl:   •  omeprazole (priLOSEC) 40 MG capsule, Take 1 capsule by mouth Daily., Disp: 90 capsule, Rfl: 3  •  PARoxetine (PAXIL) 20 MG tablet, TAKE 1 TABLET BY MOUTH DAILY, Disp: 90 tablet, Rfl: 1  •  rosuvastatin (CRESTOR) 20 MG tablet, Take 40 mg by mouth Daily., Disp: , Rfl:   •  Zinc Sulfate (ZINC 15 PO), Take  by mouth., Disp: , Rfl:     Allergies:   Allergies   Allergen Reactions   • Codeine Nausea Only        The following portions of the patient's history were reviewed by me and updated as appropriate: review of systems, allergies, current medications, past family  history, past medical history, past social history, past surgical history and problem list.    There were no vitals filed for this visit.    PHYSICAL EXAM:    CONSTITUTIONAL:  today's vital signs reviewed by me  GASTROINTESTINAL: abdomen is soft nontender nondistended with normal active bowel sounds, no masses are appreciated    Assessment/ Plan  Screening    colonoscopy    Risks and benefits as well as alternatives to endoscopic evaluation were explained to the patient and they voiced understanding and wish to proceed.  These risks include but are not limited to the risk of bleeding, perforation, adverse reaction to sedation, and missed lesions.  The patient was given the opportunity to ask questions prior to the endoscopic procedure.           Principal Discharge DX:	[place] infant of 38 completed weeks of gestation  Assessment and plan of treatment:	Routine care of . Please follow up with your pediatrician in 1-2days.   Please make sure to feed your  every 3 hours or sooner as baby demands. Breast milk is preferable, either through breastfeeding or via pumping of breast milk. If you do not have enough breast milk please supplement with formula. Please seek immediate medical attention is your baby seems to not be feeding well or has persistent vomiting. If baby appears yellow or jaundiced please consult with your pediatrician. You must follow up with your pediatrician in 1-2 days. If your baby has a fever of 100.4F or more you must seek medical care in an emergency room immediately. Please call [facility] or your pediatrician if you should have any other questions or concerns.   1

## 2024-01-16 RX ORDER — MELOXICAM 15 MG/1
15 TABLET ORAL DAILY
Qty: 90 TABLET | Refills: 0 | Status: SHIPPED | OUTPATIENT
Start: 2024-01-16

## 2024-02-05 ENCOUNTER — OFFICE VISIT (OUTPATIENT)
Dept: INTERNAL MEDICINE | Facility: CLINIC | Age: 69
End: 2024-02-05
Payer: MEDICARE

## 2024-02-05 VITALS
BODY MASS INDEX: 44.93 KG/M2 | SYSTOLIC BLOOD PRESSURE: 140 MMHG | HEIGHT: 61 IN | DIASTOLIC BLOOD PRESSURE: 82 MMHG | HEART RATE: 78 BPM | WEIGHT: 238 LBS

## 2024-02-05 DIAGNOSIS — I10 BENIGN ESSENTIAL HYPERTENSION: Chronic | ICD-10-CM

## 2024-02-05 DIAGNOSIS — E11.65 TYPE 2 DIABETES MELLITUS WITH HYPERGLYCEMIA, WITHOUT LONG-TERM CURRENT USE OF INSULIN: Primary | Chronic | ICD-10-CM

## 2024-02-05 PROCEDURE — 3052F HG A1C>EQUAL 8.0%<EQUAL 9.0%: CPT | Performed by: INTERNAL MEDICINE

## 2024-02-05 PROCEDURE — 99214 OFFICE O/P EST MOD 30 MIN: CPT | Performed by: INTERNAL MEDICINE

## 2024-02-05 PROCEDURE — 3077F SYST BP >= 140 MM HG: CPT | Performed by: INTERNAL MEDICINE

## 2024-02-05 PROCEDURE — 3079F DIAST BP 80-89 MM HG: CPT | Performed by: INTERNAL MEDICINE

## 2024-02-05 PROCEDURE — 1160F RVW MEDS BY RX/DR IN RCRD: CPT | Performed by: INTERNAL MEDICINE

## 2024-02-05 PROCEDURE — 1159F MED LIST DOCD IN RCRD: CPT | Performed by: INTERNAL MEDICINE

## 2024-02-05 RX ORDER — VALACYCLOVIR HYDROCHLORIDE 500 MG/1
500 TABLET, FILM COATED ORAL 2 TIMES DAILY
COMMUNITY

## 2024-02-05 NOTE — PROGRESS NOTES
"Chief Complaint  Diabetes and Hypertension    Subjective        Ashley Romero presents to [facility] PRIMARY [facility]  History of Present Illness here to f/u diabetes- she doesn't take any medications- admits she is not eating well. Doesn't think she is doing anything different now than she was a year + ago.   Little exercise.   Thinks her dry mouth is related to CPAP and HCTZ- but can be all day.  Does drink a lot of water.     Objective   Vital Signs:  /82   Pulse 78   Ht 154.9 cm (61\")   Wt 108 kg (238 lb)   BMI 44.97 kg/m²   Estimated body mass index is 44.97 kg/m² as calculated from the following:    Height as of this encounter: 154.9 cm (61\").    Weight as of this encounter: 108 kg (238 lb).               Physical Exam  Constitutional:       Appearance: Normal appearance.   Cardiovascular:      Rate and Rhythm: Normal rate and regular rhythm.   Musculoskeletal:      Right lower leg: No edema.      Left lower leg: No edema.        Result Review :    The following data was reviewed by: Andreia Schaefer MD on 02/05/2024:  CMP          4/10/2023    10:57 10/11/2023    10:47 1/25/2024    11:12   CMP   Glucose 146  197  180    BUN 16  25  15    Creatinine 0.81  0.88  0.86    Sodium 139  137  140    Potassium 3.7  3.5  3.7    Chloride 97  92  100    Calcium 10.1  10.0  9.7    Total Protein 7.1  7.8  7.1    Albumin 4.8  4.9  4.8    Globulin 2.3  2.9  2.3    Total Bilirubin 0.7  0.7  0.7    Alkaline Phosphatase 73  84  86    AST (SGOT) 25  30  32    ALT (SGPT) 27  23  28    BUN/Creatinine Ratio 19.8  28  17.4    HGB               Assessment and Plan     Diagnoses and all orders for this visit:    1. Type 2 diabetes mellitus with hyperglycemia, without long-term current use of insulin (Primary)  Comments:  A1C up againbut feels she could be better at diet/exercise- does not want to add meds. Will recheck 3 months with plan to start if no improv.    2. Benign essential " hypertension  Comments:  borderling control, would check at home, note goal < 130/80             Follow Up     Return in about 3 months (around 5/5/2024) for Recheck, Lab Before FUP.  Patient was given instructions and counseling regarding her condition or for health maintenance advice. Please see specific information pulled into the AVS if appropriate.

## 2024-02-07 DIAGNOSIS — E11.65 TYPE 2 DIABETES MELLITUS WITH HYPERGLYCEMIA, WITHOUT LONG-TERM CURRENT USE OF INSULIN: Primary | ICD-10-CM

## 2024-04-12 DIAGNOSIS — K21.9 GASTROESOPHAGEAL REFLUX DISEASE, UNSPECIFIED WHETHER ESOPHAGITIS PRESENT: ICD-10-CM

## 2024-04-12 RX ORDER — OMEPRAZOLE 40 MG/1
40 CAPSULE, DELAYED RELEASE ORAL DAILY
Qty: 90 CAPSULE | Refills: 1 | Status: SHIPPED | OUTPATIENT
Start: 2024-04-12

## 2024-04-15 RX ORDER — PAROXETINE HYDROCHLORIDE 20 MG/1
20 TABLET, FILM COATED ORAL DAILY
Qty: 90 TABLET | Refills: 1 | Status: SHIPPED | OUTPATIENT
Start: 2024-04-15

## 2024-05-15 RX ORDER — CEPHALEXIN 500 MG/1
500 CAPSULE ORAL 4 TIMES DAILY
Qty: 10 CAPSULE | Refills: 0 | Status: SHIPPED | OUTPATIENT
Start: 2024-05-15 | End: 2024-05-20

## 2024-05-20 ENCOUNTER — OFFICE VISIT (OUTPATIENT)
Dept: INTERNAL MEDICINE | Facility: CLINIC | Age: 69
End: 2024-05-20
Payer: MEDICARE

## 2024-05-20 VITALS
BODY MASS INDEX: 43.43 KG/M2 | HEIGHT: 61 IN | WEIGHT: 230 LBS | DIASTOLIC BLOOD PRESSURE: 76 MMHG | SYSTOLIC BLOOD PRESSURE: 140 MMHG

## 2024-05-20 DIAGNOSIS — I10 BENIGN ESSENTIAL HYPERTENSION: Chronic | ICD-10-CM

## 2024-05-20 DIAGNOSIS — Z00.00 MEDICARE ANNUAL WELLNESS VISIT, SUBSEQUENT: ICD-10-CM

## 2024-05-20 DIAGNOSIS — E78.49 OTHER HYPERLIPIDEMIA: Chronic | ICD-10-CM

## 2024-05-20 DIAGNOSIS — E11.65 TYPE 2 DIABETES MELLITUS WITH HYPERGLYCEMIA, WITHOUT LONG-TERM CURRENT USE OF INSULIN: Primary | Chronic | ICD-10-CM

## 2024-05-20 PROCEDURE — 3078F DIAST BP <80 MM HG: CPT | Performed by: INTERNAL MEDICINE

## 2024-05-20 PROCEDURE — 1159F MED LIST DOCD IN RCRD: CPT | Performed by: INTERNAL MEDICINE

## 2024-05-20 PROCEDURE — G0402 INITIAL PREVENTIVE EXAM: HCPCS | Performed by: INTERNAL MEDICINE

## 2024-05-20 PROCEDURE — 3052F HG A1C>EQUAL 8.0%<EQUAL 9.0%: CPT | Performed by: INTERNAL MEDICINE

## 2024-05-20 PROCEDURE — 3077F SYST BP >= 140 MM HG: CPT | Performed by: INTERNAL MEDICINE

## 2024-05-20 PROCEDURE — 1160F RVW MEDS BY RX/DR IN RCRD: CPT | Performed by: INTERNAL MEDICINE

## 2024-05-20 PROCEDURE — 1125F AMNT PAIN NOTED PAIN PRSNT: CPT | Performed by: INTERNAL MEDICINE

## 2024-05-20 PROCEDURE — 1170F FXNL STATUS ASSESSED: CPT | Performed by: INTERNAL MEDICINE

## 2024-05-20 RX ORDER — FENOFIBRATE 145 MG/1
145 TABLET, COATED ORAL DAILY
COMMUNITY
Start: 2024-05-13

## 2024-05-20 NOTE — PROGRESS NOTES
The ABCs of the Annual Wellness Visit  [place] to Medicare Visit    Subjective     Ashley Romero is a 68 y.o. female who presents for a  Welcome to Medicare Visit.    The following portions of the patient's history were reviewed and   updated as appropriate: She  has a past medical history of Arthritis, Owens esophagus, Benign essential hypertension, Depression, Family history of colonic polyps, GERD (gastroesophageal reflux disease), History of ulcer disease, Hyperglycemia, Hyperlipidemia, Nonalcoholic fatty liver disease, Sleep apnea, Viral illness, and Yeast infection.  She does not have any pertinent problems on file.  She  has a past surgical history that includes Tonsillectomy; Tubal ligation; Esophagogastroduodenoscopy; Total knee arthroplasty (Right, 3/14/2019); Total knee arthroplasty (Right, 03/2019); Total knee arthroplasty (Left, 8/15/2019); Upper gastrointestinal endoscopy; Colonoscopy (2005); Esophagogastroduodenoscopy (Left, 6/3/2021); and Colonoscopy (N/A, 8/10/2022).  Her family history includes Breast cancer in her maternal aunt; Colon polyps in her maternal grandfather; Colonic polyp in her maternal grandfather; Diabetes in her mother; Graves' disease in her mother; Heart disease in her father.  She  reports that she has never smoked. She has never used smokeless tobacco. She reports current alcohol use. She reports that she does not use drugs.  Current Outpatient Medications   Medication Sig Dispense Refill    acetaminophen (TYLENOL) 500 MG tablet Take 2 tablets by mouth Every 6 (Six) Hours As Needed for Mild Pain.      amLODIPine (NORVASC) 5 MG tablet TAKE 1 TABLET BY MOUTH DAILY 90 tablet 1    aspirin 81 MG EC tablet Take 1 tablet by mouth 2 (Two) Times a Day With Meals. 60 tablet 0    Blood Gluc Meter Disp-Strips device Use 1 Device Daily. 1 each 0    Cholecalciferol 25 MCG (1000 UT) tablet Take 1 tablet by mouth Daily. Indications: Vitamin D Deficiency      fenofibrate (TRICOR) 145 MG  tablet Take 1 tablet by mouth Daily.      furosemide (LASIX) 40 MG tablet Take 1 tablet by mouth Daily.      hydroCHLOROthiazide (HYDRODIURIL) 25 MG tablet TAKE 1 TABLET BY MOUTH DAILY 90 tablet 2    losartan (COZAAR) 100 MG tablet TAKE 1 TABLET BY MOUTH DAILY 90 tablet 3    metoprolol succinate XL (TOPROL-XL) 25 MG 24 hr tablet Take 1 tablet by mouth Daily.      omeprazole (priLOSEC) 40 MG capsule TAKE 1 CAPSULE BY MOUTH DAILY 90 capsule 1    PARoxetine (PAXIL) 20 MG tablet TAKE 1 TABLET BY MOUTH DAILY 90 tablet 1    rosuvastatin (CRESTOR) 40 MG tablet Take 1 tablet by mouth Daily.      Zinc Sulfate (ZINC 15 PO) Take  by mouth.       No current facility-administered medications for this visit.     Current Outpatient Medications on File Prior to Visit   Medication Sig    acetaminophen (TYLENOL) 500 MG tablet Take 2 tablets by mouth Every 6 (Six) Hours As Needed for Mild Pain.    amLODIPine (NORVASC) 5 MG tablet TAKE 1 TABLET BY MOUTH DAILY    aspirin 81 MG EC tablet Take 1 tablet by mouth 2 (Two) Times a Day With Meals.    Blood Gluc Meter Disp-Strips device Use 1 Device Daily.    Cholecalciferol 25 MCG (1000 UT) tablet Take 1 tablet by mouth Daily. Indications: Vitamin D Deficiency    fenofibrate (TRICOR) 145 MG tablet Take 1 tablet by mouth Daily.    furosemide (LASIX) 40 MG tablet Take 1 tablet by mouth Daily.    hydroCHLOROthiazide (HYDRODIURIL) 25 MG tablet TAKE 1 TABLET BY MOUTH DAILY    losartan (COZAAR) 100 MG tablet TAKE 1 TABLET BY MOUTH DAILY    metoprolol succinate XL (TOPROL-XL) 25 MG 24 hr tablet Take 1 tablet by mouth Daily.    omeprazole (priLOSEC) 40 MG capsule TAKE 1 CAPSULE BY MOUTH DAILY    PARoxetine (PAXIL) 20 MG tablet TAKE 1 TABLET BY MOUTH DAILY    rosuvastatin (CRESTOR) 40 MG tablet Take 1 tablet by mouth Daily.    Zinc Sulfate (ZINC 15 PO) Take  by mouth.    [DISCONTINUED] cephalexin (Keflex) 500 MG capsule Take 1 capsule by mouth 4 (Four) Times a Day. (Patient not taking: Reported on  5/20/2024)    [DISCONTINUED] CINNAMON PO Take 1,000 mg by mouth Daily. (Patient not taking: Reported on 5/20/2024)    [DISCONTINUED] glucose blood test strip Use one daily to check blood sugars    [DISCONTINUED] ibuprofen (ADVIL,MOTRIN) 800 MG tablet TAKE 1 TABLET BY MOUTH EVERY 8 HOURS WITH FOOD AS NEEDED (Patient not taking: Reported on 5/20/2024)    [DISCONTINUED] valACYclovir (VALTREX) 500 MG tablet Take 1 tablet by mouth 2 (Two) Times a Day. Indications: Shingles (Patient not taking: Reported on 5/20/2024)     No current facility-administered medications on file prior to visit.     She is allergic to sulfa antibiotics and codeine..     Compared to one year ago, the patient feels her physical   health is better.    Compared to one year ago, the patient feels her mental   health is better.    Recent Hospitalizations:  She was not admitted to the hospital during the last year.       Current Medical Providers:  Patient Care Team:  Andreia Schaefer MD as PCP - General (Internal Medicine)  Carlyle Vo MD as Consulting Physician (Cardiology)  Anderson Garcia MD as Consulting Physician (Gastroenterology)    Outpatient Medications Prior to Visit   Medication Sig Dispense Refill    acetaminophen (TYLENOL) 500 MG tablet Take 2 tablets by mouth Every 6 (Six) Hours As Needed for Mild Pain.      amLODIPine (NORVASC) 5 MG tablet TAKE 1 TABLET BY MOUTH DAILY 90 tablet 1    aspirin 81 MG EC tablet Take 1 tablet by mouth 2 (Two) Times a Day With Meals. 60 tablet 0    Blood Gluc Meter Disp-Strips device Use 1 Device Daily. 1 each 0    Cholecalciferol 25 MCG (1000 UT) tablet Take 1 tablet by mouth Daily. Indications: Vitamin D Deficiency      fenofibrate (TRICOR) 145 MG tablet Take 1 tablet by mouth Daily.      furosemide (LASIX) 40 MG tablet Take 1 tablet by mouth Daily.      hydroCHLOROthiazide (HYDRODIURIL) 25 MG tablet TAKE 1 TABLET BY MOUTH DAILY 90 tablet 2    losartan (COZAAR) 100 MG tablet TAKE 1 TABLET BY MOUTH  DAILY 90 tablet 3    metoprolol succinate XL (TOPROL-XL) 25 MG 24 hr tablet Take 1 tablet by mouth Daily.      omeprazole (priLOSEC) 40 MG capsule TAKE 1 CAPSULE BY MOUTH DAILY 90 capsule 1    PARoxetine (PAXIL) 20 MG tablet TAKE 1 TABLET BY MOUTH DAILY 90 tablet 1    rosuvastatin (CRESTOR) 40 MG tablet Take 1 tablet by mouth Daily.      Zinc Sulfate (ZINC 15 PO) Take  by mouth.      cephalexin (Keflex) 500 MG capsule Take 1 capsule by mouth 4 (Four) Times a Day. (Patient not taking: Reported on 5/20/2024) 10 capsule 0    CINNAMON PO Take 1,000 mg by mouth Daily. (Patient not taking: Reported on 5/20/2024)      glucose blood test strip Use one daily to check blood sugars 100 each 12    ibuprofen (ADVIL,MOTRIN) 800 MG tablet TAKE 1 TABLET BY MOUTH EVERY 8 HOURS WITH FOOD AS NEEDED (Patient not taking: Reported on 5/20/2024)      valACYclovir (VALTREX) 500 MG tablet Take 1 tablet by mouth 2 (Two) Times a Day. Indications: Shingles (Patient not taking: Reported on 5/20/2024)       No facility-administered medications prior to visit.       No opioid medication identified on active medication list. I have reviewed chart for other potential  high risk medication/s and harmful drug interactions in the elderly.        Aspirin is on active medication list. Aspirin use is indicated based on review of current medical condition/s. Pros and cons of this therapy have been discussed today. Benefits of this medication outweigh potential harm.  Patient has been encouraged to continue taking this medication.  .      Patient Active Problem List   Diagnosis    OA (osteoarthritis) of knee    Ascending aorta dilatation    Benign essential hypertension    Hyperlipidemia    Vasovagal syncope    Obstructive sleep apnea syndrome    Owens's esophagus without dysplasia    Gastroesophageal reflux disease    Type 2 diabetes mellitus with hyperglycemia, without long-term current use of insulin    Gastroparesis    Encounter for screening for  "malignant neoplasm of colon    CAD in native artery     Advance Care Planning   Advance Care Planning     Advance Directive is not on file.  ACP discussion was held with the patient during this visit. Patient has an advance directive (not in EMR), copy requested.       Objective   Vitals:    24 1132   BP: 140/76   Weight: 104 kg (230 lb)   Height: 154.9 cm (61\")     Estimated body mass index is 43.46 kg/m² as calculated from the following:    Height as of this encounter: 154.9 cm (61\").    Weight as of this encounter: 104 kg (230 lb).           Does the patient have evidence of cognitive impairment?   No    Lab Results   Component Value Date    CHLPL 128 2024    TRIG 265 (H) 2024    HDL 37 (L) 2024    LDL 50 2024    VLDL 41 (H) 2024    HGBA1C 8.20 (H) 2024       Procedures       HEALTH RISK ASSESSMENT    Smoking Status:  Social History     Tobacco Use   Smoking Status Never   Smokeless Tobacco Never     Alcohol Consumption:  Social History     Substance and Sexual Activity   Alcohol Use Yes    Comment: OCCASIONALLY       Fall Risk Screen:    STEADI Fall Risk Assessment was completed, and patient is at LOW risk for falls.Assessment completed on:2024    Depression Screen:       2024    12:00 PM   PHQ-2/PHQ-9 Depression Screening   Little Interest or Pleasure in Doing Things 0-->not at all   Feeling Down, Depressed or Hopeless 0-->not at all   PHQ-9: Brief Depression Severity Measure Score 0       Health Habits and Functional and Cognitive Screenin/20/2024    12:00 PM   Functional & Cognitive Status   Do you have difficulty preparing food and eating? No   Do you have difficulty bathing yourself, getting dressed or grooming yourself? No   Do you have difficulty using the toilet? No   Do you have difficulty moving around from place to place? No   Do you have trouble with steps or getting out of a bed or a chair? No   Current Diet Well Balanced Diet   Dental " Exam Up to date   Eye Exam Up to date   Exercise (times per week) 0 times per week   Current Exercises Include No Regular Exercise   Do you need help using the phone?  No   Are you deaf or do you have serious difficulty hearing?  No   Do you need help to go to places out of walking distance? No   Do you need help shopping? No   Do you need help preparing meals?  No   Do you need help with housework?  No   Do you need help with laundry? No   Do you need help taking your medications? No   Do you need help managing money? No   Do you ever drive or ride in a car without wearing a seat belt? No   Have you felt unusual stress, anger or loneliness in the last month? No   Who do you live with? Spouse   If you need help, do you have trouble finding someone available to you? No   Have you been bothered in the last four weeks by sexual problems? No   Do you have difficulty concentrating, remembering or making decisions? No       Visual Acuity:    Vision Screening    Right eye Left eye Both eyes   Without correction 20/20 20/20 20/20   With correction          Age-appropriate Screening Schedule:  Refer to the list below for future screening recommendations based on patient's age, sex and/or medical conditions. Orders for these recommended tests are listed in the plan section. The patient has been provided with a written plan.    Health Maintenance   Topic Date Due    DIABETIC EYE EXAM  Never done    TDAP/TD VACCINES (1 - Tdap) Never done    RSV Vaccine - Adults (1 - 1-dose 60+ series) Never done    ZOSTER VACCINE (2 of 3) 10/04/2016    ANNUAL WELLNESS VISIT  Never done    COVID-19 Vaccine (3 - 2023-24 season) 09/01/2023    DIABETIC FOOT EXAM  04/17/2024    INFLUENZA VACCINE  08/01/2024    BMI FOLLOWUP  10/18/2024    HEMOGLOBIN A1C  11/13/2024    DXA SCAN  11/17/2024    MAMMOGRAM  05/04/2025    LIPID PANEL  05/13/2025    URINE MICROALBUMIN  05/13/2025    PAP SMEAR  04/17/2026    COLORECTAL CANCER SCREENING  08/10/2032     "HEPATITIS C SCREENING  Completed    Pneumococcal Vaccine 65+  Completed        CMS Preventative Services Quick Reference  Risk Factors Identified During Encounter    Immunizations Discussed/Encouraged: Tdap and Shingrix  The above risks/problems have been discussed with the patient.  Pertinent information has been shared with the patient in the After Visit Summary.    Follow Up:   Initial Medicare Visit in one year    An After Visit Summary and PPPS were made available to the patient.      Additional E&M Note during same encounter follows:  Patient has multiple medical problems which are significant and separately identifiable that require additional work above and beyond the Medicare Wellness Visit.      Chief Complaint  Hypertension    Subjective        HPI  Ashley Romero is also being seen today for diabetes, hypertension. She is working hard at weight loss- she is eating better. Has not added more exercise. She feels good. Taking care of elderly father getting tx for cancer.   Medication adherence.   Needs DM eye exam.         Objective   Vital Signs:  /76   Ht 154.9 cm (61\")   Wt 104 kg (230 lb)   BMI 43.46 kg/m²     Physical Exam  Constitutional:       Appearance: Normal appearance. She is obese.   Cardiovascular:      Rate and Rhythm: Normal rate and regular rhythm.      Pulses:           Dorsalis pedis pulses are 1+ on the right side and 1+ on the left side.        Posterior tibial pulses are 1+ on the right side and 1+ on the left side.   Pulmonary:      Effort: Pulmonary effort is normal.   Musculoskeletal:      Right lower leg: No edema.      Left lower leg: No edema.   Feet:      Right foot:      Skin integrity: Skin integrity normal.      Left foot:      Skin integrity: Skin integrity normal.      Comments: Monofilament Test Performed  Diabetic Foot Exam Performed    Psychiatric:         Mood and Affect: Mood normal.         Thought Content: Thought content normal.          The following " data was reviewed by: Andreia Schaefer MD on 05/20/2024:  Common labs          10/11/2023    10:47 1/25/2024    11:12 5/13/2024    13:05   Common Labs   Glucose 197  180  153    BUN 25  15  15    Creatinine 0.88  0.86  0.76    Sodium 137  140  142    Potassium 3.5  3.7  4.1    Chloride 92  100  100    Calcium 10.0  9.7  9.9    Total Protein 7.8  7.1  7.8    Albumin 4.9  4.8  4.8    Total Bilirubin 0.7  0.7  0.6    Alkaline Phosphatase 84  86  89    AST (SGOT) 30  32  31    ALT (SGPT) 23  28  28    Total Cholesterol 124   128    Triglycerides 264   265    HDL Cholesterol 34   37    LDL Cholesterol  49   50    Hemoglobin A1C 7.9  8.50  8.20    Microalbumin, Urine   6.3              Assessment and Plan   Diagnoses and all orders for this visit:    1. Type 2 diabetes mellitus with hyperglycemia, without long-term current use of insulin (Primary)  Comments:  mild improvement- to cont same, no change made in medication. Mild increase in microalbumin-    2. Other hyperlipidemia  Comments:  Labs reveiwed- elevated TAG will come down with BS.    3. Benign essential hypertension  Comments:  higher here than at home, will check at home and let me know in the next days.    4. Medicare annual wellness visit, subsequent  Comments:  needs DM eye exam, vaccines reveiwed- doubtful she will get now. Keep working on diet and add exercise- even a walk daily.             Follow Up   Return in about 4 months (around 9/20/2024) for Recheck, Lab Before FUP.  Patient was given instructions and counseling regarding her condition or for health maintenance advice. Please see specific information pulled into the AVS if appropriate.

## 2024-05-20 NOTE — PROGRESS NOTES
"Chief Complaint  Hypertension    Subjective    {Problem List  Visit Diagnosis   Encounters  Notes  Medications  Labs  Result Review Imaging  Media :23}    Ashley Romero presents to [facility]  History of Present Illness    Objective   Vital Signs:  Ht 154.9 cm (61\")   Wt 104 kg (230 lb)   BMI 43.46 kg/m²   Estimated body mass index is 43.46 kg/m² as calculated from the following:    Height as of this encounter: 154.9 cm (61\").    Weight as of this encounter: 104 kg (230 lb).               Physical Exam   Result Review :{Labs  Result Review  Imaging  Med Tab  Media  Procedures :23}    {The following data was reviewed by (Optional):47802}  {Ambulatory Labs (Optional):85407}  {Data reviewed (Optional):59839:::1}             Assessment and Plan {CC Problem List  Visit Diagnosis   ROS  Review (Popup)  Health Maintenance  Quality  BestPractice  Medications  SmartSets  SnapShot Encounters  Media :23}    There are no diagnoses linked to this encounter.       {Time Spent (Optional):72477}  Follow Up {Instructions Charge Capture  Follow-up Communications :23}    No follow-ups on file.  Patient was given instructions and counseling regarding her condition or for health maintenance advice. Please see specific information pulled into the AVS if appropriate.         "

## 2024-05-20 NOTE — PROGRESS NOTES
"The ABCs of the Annual Wellness Visit  Initial Medicare Wellness Visit    Subjective   {Wrapup  Review (Popup)  Advance Care Planning  Labs  CC  Problem List  Visit Diagnosis  Medications  Result Review  Imaging  [facility]  [facility]  [facility]  SnapShot  Encounters  Notes  Media  Procedures :23}  Ashley Romero is a 68 y.o. female who presents for an Initial Medicare Wellness Visit.    The following portions of the patient's history were reviewed and   updated as appropriate: {history reviewed:20406::\"allergies\",\"current medications\",\"past family history\",\"past medical history\",\"past social history\",\"past surgical history\",\"problem list\"}.     Compared to one year ago, the patient feels her physical   health is {better worse same:28871}.    Compared to one year ago, the patient feels her mental   health is {better worse same:93512}.    Recent Hospitalizations:  {Hospital Admission Status in the last 365 days:62680}      Current Medical Providers:  Patient Care Team:  Andreia Schaefre MD as PCP - General (Internal Medicine)  Carlyle Vo MD as Consulting Physician (Cardiology)  Anderson Garcia MD as Consulting Physician (Gastroenterology)    Outpatient Medications Prior to Visit   Medication Sig Dispense Refill    acetaminophen (TYLENOL) 500 MG tablet Take 2 tablets by mouth Every 6 (Six) Hours As Needed for Mild Pain.      amLODIPine (NORVASC) 5 MG tablet TAKE 1 TABLET BY MOUTH DAILY 90 tablet 1    aspirin 81 MG EC tablet Take 1 tablet by mouth 2 (Two) Times a Day With Meals. 60 tablet 0    Blood Gluc Meter Disp-Strips device Use 1 Device Daily. 1 each 0    Cholecalciferol 25 MCG (1000 UT) tablet Take 1 tablet by mouth Daily. Indications: Vitamin D Deficiency      fenofibrate (TRICOR) 145 MG tablet Take 1 tablet by mouth Daily.      furosemide (LASIX) 40 MG tablet Take 1 tablet by mouth Daily.      hydroCHLOROthiazide (HYDRODIURIL) 25 MG tablet TAKE 1 TABLET BY MOUTH " DAILY 90 tablet 2    losartan (COZAAR) 100 MG tablet TAKE 1 TABLET BY MOUTH DAILY 90 tablet 3    metoprolol succinate XL (TOPROL-XL) 25 MG 24 hr tablet Take 1 tablet by mouth Daily.      omeprazole (priLOSEC) 40 MG capsule TAKE 1 CAPSULE BY MOUTH DAILY 90 capsule 1    PARoxetine (PAXIL) 20 MG tablet TAKE 1 TABLET BY MOUTH DAILY 90 tablet 1    rosuvastatin (CRESTOR) 40 MG tablet Take 1 tablet by mouth Daily.      Zinc Sulfate (ZINC 15 PO) Take  by mouth.      cephalexin (Keflex) 500 MG capsule Take 1 capsule by mouth 4 (Four) Times a Day. (Patient not taking: Reported on 5/20/2024) 10 capsule 0    CINNAMON PO Take 1,000 mg by mouth Daily. (Patient not taking: Reported on 5/20/2024)      glucose blood test strip Use one daily to check blood sugars 100 each 12    ibuprofen (ADVIL,MOTRIN) 800 MG tablet TAKE 1 TABLET BY MOUTH EVERY 8 HOURS WITH FOOD AS NEEDED (Patient not taking: Reported on 5/20/2024)      valACYclovir (VALTREX) 500 MG tablet Take 1 tablet by mouth 2 (Two) Times a Day. Indications: Shingles (Patient not taking: Reported on 5/20/2024)       No facility-administered medications prior to visit.       No opioid medication identified on active medication list. I have reviewed chart for other potential  high risk medication/s and harmful drug interactions in the elderly.        Aspirin is on active medication list. {ASPIRIN ON MEDICATION LIST INDICATED/NOT INDICATED:75221}.      Patient Active Problem List   Diagnosis    OA (osteoarthritis) of knee    Ascending aorta dilatation    Benign essential hypertension    Hyperlipidemia    Vasovagal syncope    Obstructive sleep apnea syndrome    Owens's esophagus without dysplasia    Gastroesophageal reflux disease    Controlled type 2 diabetes mellitus without complication, without long-term current use of insulin    Gastroparesis    Encounter for screening for malignant neoplasm of colon    CAD in native artery     Advance Care Planning   Advance Care Planning    "  Advance Directive is not on file.  {ACP Discussion, Advance Directive not in EMR:01253}       Objective    Vitals:    05/20/24 1132   Weight: 104 kg (230 lb)   Height: 154.9 cm (61\")     Estimated body mass index is 43.46 kg/m² as calculated from the following:    Height as of this encounter: 154.9 cm (61\").    Weight as of this encounter: 104 kg (230 lb).           Does the patient have evidence of cognitive impairment?   {Yes/No:46065}    Lab Results   Component Value Date    CHLPL 128 05/13/2024    TRIG 265 (H) 05/13/2024    HDL 37 (L) 05/13/2024    LDL 50 05/13/2024    VLDL 41 (H) 05/13/2024    HGBA1C 8.20 (H) 05/13/2024        HEALTH RISK ASSESSMENT    Smoking Status:  Social History     Tobacco Use   Smoking Status Never   Smokeless Tobacco Never     Alcohol Consumption:  Social History     Substance and Sexual Activity   Alcohol Use Yes    Comment: OCCASIONALLY     Fall Risk Screen:    ADRIEL Fall Risk Assessment has not been completed.    Depression Screen:       4/17/2023     3:27 PM   PHQ-2/PHQ-9 Depression Screening   Little Interest or Pleasure in Doing Things 0-->not at all   Feeling Down, Depressed or Hopeless 0-->not at all   PHQ-9: Brief Depression Severity Measure Score 0       Health Habits and Functional and Cognitive Screening:      3/30/2022     1:23 PM   Functional & Cognitive Status   Do you have difficulty preparing food and eating? No   Do you have difficulty bathing yourself, getting dressed or grooming yourself? No   Do you have difficulty using the toilet? No   Do you have difficulty moving around from place to place? No   Do you have trouble with steps or getting out of a bed or a chair? No   Current Diet Well Balanced Diet   Dental Exam Up to date   Eye Exam Not up to date   Exercise (times per week) 0 times per week   Current Exercises Include No Regular Exercise   Do you need help using the phone?  No   Are you deaf or do you have serious difficulty hearing?  No   Do you need help " to go to places out of walking distance? No   Do you need help shopping? No   Do you need help preparing meals?  No   Do you need help with housework?  No   Do you need help with laundry? No   Do you need help taking your medications? No   Do you need help managing money? No   Do you ever drive or ride in a car without wearing a seat belt? No   Have you felt unusual stress, anger or loneliness in the last month? No   Who do you live with? Spouse   If you need help, do you have trouble finding someone available to you? No   Have you been bothered in the last four weeks by sexual problems? No   Do you have difficulty concentrating, remembering or making decisions? No       Age-appropriate Screening Schedule:  Refer to the list below for future screening recommendations based on patient's age, sex and/or medical conditions. Orders for these recommended tests are listed in the plan section. The patient has been provided with a written plan.    Health Maintenance   Topic Date Due    DIABETIC EYE EXAM  Never done    TDAP/TD VACCINES (1 - Tdap) Never done    RSV Vaccine - Adults (1 - 1-dose 60+ series) Never done    ZOSTER VACCINE (2 of 3) 10/04/2016    ANNUAL WELLNESS VISIT  Never done    COVID-19 Vaccine (3 - 2023-24 season) 09/01/2023    DIABETIC FOOT EXAM  04/17/2024    INFLUENZA VACCINE  08/01/2024    BMI FOLLOWUP  10/18/2024    HEMOGLOBIN A1C  11/13/2024    DXA SCAN  11/17/2024    MAMMOGRAM  05/04/2025    LIPID PANEL  05/13/2025    URINE MICROALBUMIN  05/13/2025    PAP SMEAR  04/17/2026    COLORECTAL CANCER SCREENING  08/10/2032    HEPATITIS C SCREENING  Completed    Pneumococcal Vaccine 65+  Completed          CMS Preventative Services Quick Reference  Risk Factors Identified During Encounter    {Medicare Wellness Risk Factors:19274}    The above risks/problems have been discussed with the patient.  Pertinent information has been shared with the patient in the After Visit Summary.  An After Visit Summary and PPPS  "were made available to the patient.  There are no diagnoses linked to this encounter.  Follow Up:  Next Medicare Wellness visit to be scheduled in 1 year.    {Wrapup  Review (Popup)  Advance Care Planning  Labs  CC  Problem List  Visit Diagnosis  Medications  Result Review  Imaging  [facility]  BestPractice  SmartSets  SnapShot  Encounters  Notes  Media  Procedures :23}    Additional E&M Note during same encounter follows:  Patient has multiple medical problems which are significant and separately identifiable that require additional work above and beyond the Medicare Wellness Visit.      Chief Complaint  Hypertension    Subjective    {Problem List  Visit Diagnosis   Encounters  Notes  Medications  Labs  Result Review Imaging  Media :23}    HPI  Ashley Romero is also being seen today for diabetes, hypertension.  She is working on eating better-          Objective   Vital Signs:  Ht 154.9 cm (61\")   Wt 104 kg (230 lb)   BMI 43.46 kg/m²     Physical Exam     The following data was reviewed by: Andreia Schaefer MD on 05/20/2024:  Common labs          10/11/2023    10:47 1/25/2024    11:12 5/13/2024    13:05   Common Labs   Glucose 197  180  153    BUN 25  15  15    Creatinine 0.88  0.86  0.76    Sodium 137  140  142    Potassium 3.5  3.7  4.1    Chloride 92  100  100    Calcium 10.0  9.7  9.9    Total Protein 7.8  7.1  7.8    Albumin 4.9  4.8  4.8    Total Bilirubin 0.7  0.7  0.6    Alkaline Phosphatase 84  86  89    AST (SGOT) 30  32  31    ALT (SGPT) 23  28  28    Total Cholesterol 124   128    Triglycerides 264   265    HDL Cholesterol 34   37    LDL Cholesterol  49   50    Hemoglobin A1C 7.9  8.50  8.20    Microalbumin, Urine   6.3      {Data reviewed (Optional):69652:::1}           Assessment and Plan {CC Problem List  Visit Diagnosis   ROS  Review (Popup)  [facility]  BestPractice  Medications  SmartSets  SnapShot Encounters  Media " :23}  There are no diagnoses linked to this encounter.       {Time Spent (Optional):68980}  Follow Up {Instructions Charge Capture  Follow-up Communications :23}  No follow-ups on file.  Patient was given instructions and counseling regarding her condition or for health maintenance advice. Please see specific information pulled into the AVS if appropriate.

## 2024-06-10 NOTE — SIGNIFICANT NOTE
11/1/2017      Riddhi Sanchez  [address] [address]        To Whom it May Concern,    Riddhi Sanchez was in the clinic today for an appointment at 1:15 PM.  Patient was accompanied by Mom. Please excuse form school Friday, Tuesday and Wednesday. She will return when feeling better.         Sincerely,          Leilani Peterson MD  [place] [address] [address]  [phone]           Education provided the Patient on the following:    - Nothing to Eat or Drink after MN the night before the procedure  -You will need to have someone drive you home after your EGD and remain with you for 24 hours after the EGD  - The date of your EGD, your are welcome to have one visitor at bedside or remain within 10-15 minutes of [facility]  -Please wear warm socks when you arrive for your EGD  -Remove all jewelry and leave any valuables before arriving on the date of your procedure (all will have to be removed before leaving preop)  -You will need to arrive at 0700  on 6/11  EGD  -Feel free to contact us at: [phone] with any additional questions/concerns

## 2024-06-11 ENCOUNTER — HOSPITAL ENCOUNTER (OUTPATIENT)
Facility: SURGERY CENTER | Age: 69
Setting detail: HOSPITAL OUTPATIENT SURGERY
Discharge: HOME | End: 2024-06-11
Attending: INTERNAL MEDICINE | Admitting: INTERNAL MEDICINE
Payer: MEDICARE

## 2024-06-11 ENCOUNTER — ANESTHESIA (OUTPATIENT)
Dept: SURGERY | Facility: SURGERY CENTER | Age: 69
End: 2024-06-11
Payer: MEDICARE

## 2024-06-11 ENCOUNTER — ANESTHESIA EVENT (OUTPATIENT)
Dept: SURGERY | Facility: SURGERY CENTER | Age: 69
End: 2024-06-11
Payer: MEDICARE

## 2024-06-11 VITALS
SYSTOLIC BLOOD PRESSURE: 124 MMHG | DIASTOLIC BLOOD PRESSURE: 63 MMHG | OXYGEN SATURATION: 97 % | BODY MASS INDEX: 42.14 KG/M2 | WEIGHT: 223 LBS | HEART RATE: 71 BPM | RESPIRATION RATE: 16 BRPM | TEMPERATURE: 98.2 F

## 2024-06-11 DIAGNOSIS — K21.9 GASTROESOPHAGEAL REFLUX DISEASE WITHOUT ESOPHAGITIS: ICD-10-CM

## 2024-06-11 DIAGNOSIS — K31.84 GASTROPARESIS: ICD-10-CM

## 2024-06-11 DIAGNOSIS — K22.70 BARRETT'S ESOPHAGUS WITHOUT DYSPLASIA: ICD-10-CM

## 2024-06-11 PROCEDURE — 88305 TISSUE EXAM BY PATHOLOGIST: CPT | Performed by: INTERNAL MEDICINE

## 2024-06-11 PROCEDURE — 25010000002 PROPOFOL 10 MG/ML EMULSION: Performed by: NURSE ANESTHETIST, CERTIFIED REGISTERED

## 2024-06-11 PROCEDURE — 88104 CYTOPATH FL NONGYN SMEARS: CPT | Performed by: INTERNAL MEDICINE

## 2024-06-11 PROCEDURE — 43239 EGD BIOPSY SINGLE/MULTIPLE: CPT | Performed by: INTERNAL MEDICINE

## 2024-06-11 PROCEDURE — 25810000003 LACTATED RINGERS PER 1000 ML: Performed by: INTERNAL MEDICINE

## 2024-06-11 PROCEDURE — 25010000002 LIDOCAINE 1 % SOLUTION: Performed by: NURSE ANESTHETIST, CERTIFIED REGISTERED

## 2024-06-11 PROCEDURE — 25010000002 PROPOFOL 1000 MG/100ML EMULSION: Performed by: NURSE ANESTHETIST, CERTIFIED REGISTERED

## 2024-06-11 RX ORDER — SODIUM CHLORIDE, SODIUM LACTATE, POTASSIUM CHLORIDE, CALCIUM CHLORIDE 600; 310; 30; 20 MG/100ML; MG/100ML; MG/100ML; MG/100ML
1000 INJECTION, SOLUTION INTRAVENOUS CONTINUOUS
Status: DISCONTINUED | OUTPATIENT
Start: 2024-06-11 | End: 2024-06-11 | Stop reason: HOSPADM

## 2024-06-11 RX ORDER — SODIUM CHLORIDE 0.9 % (FLUSH) 0.9 %
10 SYRINGE (ML) INJECTION AS NEEDED
Status: DISCONTINUED | OUTPATIENT
Start: 2024-06-11 | End: 2024-06-11 | Stop reason: HOSPADM

## 2024-06-11 RX ORDER — LIDOCAINE HYDROCHLORIDE 10 MG/ML
INJECTION, SOLUTION INFILTRATION; PERINEURAL AS NEEDED
Status: DISCONTINUED | OUTPATIENT
Start: 2024-06-11 | End: 2024-06-11 | Stop reason: SURG

## 2024-06-11 RX ORDER — LIDOCAINE HYDROCHLORIDE 10 MG/ML
0.5 INJECTION, SOLUTION INFILTRATION; PERINEURAL ONCE AS NEEDED
Status: DISCONTINUED | OUTPATIENT
Start: 2024-06-11 | End: 2024-06-11 | Stop reason: HOSPADM

## 2024-06-11 RX ORDER — PROPOFOL 10 MG/ML
INJECTION, EMULSION INTRAVENOUS AS NEEDED
Status: DISCONTINUED | OUTPATIENT
Start: 2024-06-11 | End: 2024-06-11 | Stop reason: SURG

## 2024-06-11 RX ADMIN — PROPOFOL 200 MCG/KG/MIN: 10 INJECTION, EMULSION INTRAVENOUS at 08:30

## 2024-06-11 RX ADMIN — SODIUM CHLORIDE, POTASSIUM CHLORIDE, SODIUM LACTATE AND CALCIUM CHLORIDE 1000 ML: 600; 310; 30; 20 INJECTION, SOLUTION INTRAVENOUS at 07:26

## 2024-06-11 RX ADMIN — LIDOCAINE HYDROCHLORIDE 50 MG: 10 INJECTION, SOLUTION INFILTRATION; PERINEURAL at 08:30

## 2024-06-11 RX ADMIN — PROPOFOL 100 MG: 10 INJECTION, EMULSION INTRAVENOUS at 08:30

## 2024-06-11 NOTE — ANESTHESIA POSTPROCEDURE EVALUATION
Patient: Ashley Romero    Procedure Summary       Date: 06/11/24 Room / Location: [facility]    Anesthesia Start: 0828 Anesthesia Stop: 0845    Procedure: ESOPHAGOGASTRODUODENOSCOPY Diagnosis:       Owens's esophagus without dysplasia      Gastroesophageal reflux disease without esophagitis      Gastroparesis      (Owens's esophagus without dysplasia [K22.70])      (Gastroesophageal reflux disease without esophagitis [K21.9])      (Gastroparesis [K31.84])    Surgeons: Anderson Garcia MD Provider: Marvin Melara MD    Anesthesia Type: MAC ASA Status: 3            Anesthesia Type: MAC    Vitals  Vitals Value Taken Time   /63 06/11/24 0908   Temp 36.8 °C (98.2 °F) 06/11/24 0845   Pulse 71 06/11/24 0908   Resp 16 06/11/24 0908   SpO2 97 % 06/11/24 0908           Post Anesthesia Care and Evaluation    Patient location during evaluation: bedside  Patient participation: complete - patient participated  Level of consciousness: awake and alert  Pain management: adequate    Airway patency: patent  Anesthetic complications: No anesthetic complications    Cardiovascular status: acceptable  Respiratory status: acceptable  Hydration status: acceptable    Comments: /63 (BP Location: Left arm, Patient Position: Lying)   Pulse 71   Temp 36.8 °C (98.2 °F) (Temporal)   Resp 16   Wt 101 kg (223 lb)   SpO2 97%   BMI 42.14 kg/m²

## 2024-06-11 NOTE — ANESTHESIA PREPROCEDURE EVALUATION
Anesthesia Evaluation     Patient summary reviewed and Nursing notes reviewed   NPO Solid Status: > 8 hours  NPO Liquid Status: > 2 hours           Airway   Mallampati: III  Possible difficult intubation  Dental - normal exam         Pulmonary - normal exam   (+) ,sleep apnea on CPAP  Cardiovascular - normal exam    ECG reviewed    (+) hypertension, hyperlipidemia      Neuro/Psych  (+) syncope, psychiatric history Depression  GI/Hepatic/Renal/Endo    (+) morbid obesity, GERD, liver disease fatty liver disease    Musculoskeletal     Abdominal   (+) obese   Substance History      OB/GYN          Other                          Anesthesia Plan    ASA 3     MAC

## 2024-06-11 NOTE — H&P
No chief complaint on file.      HPI  Gerd  Barretts  gastroparesis         Problem List:    Patient Active Problem List   Diagnosis    OA (osteoarthritis) of knee    Ascending aorta dilatation    Benign essential hypertension    Hyperlipidemia    Vasovagal syncope    Obstructive sleep apnea syndrome    Owens's esophagus without dysplasia    Gastroesophageal reflux disease    Type 2 diabetes mellitus with hyperglycemia, without long-term current use of insulin    Gastroparesis    Encounter for screening for malignant neoplasm of colon    CAD in native artery       Medical History:    Past Medical History:   Diagnosis Date    Arthritis     Owens esophagus     Benign essential hypertension     Depression     Family history of colonic polyps     GERD (gastroesophageal reflux disease)     History of ulcer disease     Hyperglycemia     Hyperlipidemia     Nonalcoholic fatty liver disease     Sleep apnea     CPAP    Viral illness     Yeast infection         Social History:    Social History     Socioeconomic History    Marital status:    Tobacco Use    Smoking status: Never    Smokeless tobacco: Never   Vaping Use    Vaping status: Never Used   Substance and Sexual Activity    Alcohol use: Yes     Comment: OCCASIONALLY    Drug use: No    Sexual activity: Defer       Family History:   Family History   Problem Relation Age of Onset    Diabetes Mother     Graves' disease Mother     Heart disease Father     Colonic polyp Maternal Grandfather     Colon polyps Maternal Grandfather     Breast cancer Maternal Aunt     Malig Hyperthermia Neg Hx     Crohn's disease Neg Hx     Colon cancer Neg Hx     Irritable bowel syndrome Neg Hx     Ulcerative colitis Neg Hx        Surgical History:   Past Surgical History:   Procedure Laterality Date    COLONOSCOPY  2005    Dr Garcia    COLONOSCOPY N/A 8/10/2022    Procedure: COLONOSCOPY;  Surgeon: Anderson Garcia MD;  [place] [facility] [place]  Service: Gastroenterology;   Laterality: N/A;  diverticulosis hemorrhoids    ENDOSCOPY      ENDOSCOPY Left 6/3/2021    Procedure: ESOPHAGOGASTRODUODENOSCOPY;  Surgeon: Anderson Garcia MD;  [place] [facility] [place];  Service: Gastroenterology;  Laterality: Left;    TONSILLECTOMY      TOTAL KNEE ARTHROPLASTY Right 3/14/2019    Procedure: RIGHT TOTAL KNEE ARTHROPLASTY;  Surgeon: Estevan Esquivel MD;  Location: [facility] OR;  Service: Orthopedics    TOTAL KNEE ARTHROPLASTY Right 03/2019    TOTAL KNEE ARTHROPLASTY Left 8/15/2019    Procedure: TOTAL KNEE ARTHROPLASTY;  Surgeon: Estevan Esquivel MD;  Location: [facility] OR;  Service: Orthopedics    TUBAL ABDOMINAL LIGATION      UPPER GASTROINTESTINAL ENDOSCOPY           Current Facility-Administered Medications:     lactated ringers infusion 1,000 mL, 1,000 mL, Intravenous, Continuous, Anderson Garcia MD    lidocaine (XYLOCAINE) 1 % injection 0.5 mL, 0.5 mL, Intradermal, Once PRN, Anderson Garcia MD    sodium chloride 0.9 % flush 10 mL, 10 mL, Intravenous, PRN, Anderson Garcia MD    Allergies:   Allergies   Allergen Reactions    Sulfa Antibiotics Other (See Comments)    Codeine Nausea Only        The following portions of the patient's history were reviewed by me and updated as appropriate: review of systems, allergies, current medications, past family history, past medical history, past social history, past surgical history and problem list.    There were no vitals filed for this visit.    PHYSICAL EXAM:    CONSTITUTIONAL:  today's vital signs reviewed by me  GASTROINTESTINAL: abdomen is soft nontender nondistended with normal active bowel sounds, no masses are appreciated    Assessment/ Plan  Gerd  Barretts  Gastroparesis    egd    Risks and benefits as well as alternatives to endoscopic evaluation were explained to the patient and they voiced understanding and wish to proceed.  These risks include but are not limited to the risk of bleeding, perforation, adverse reaction to  sedation, and missed lesions.  The patient was given the opportunity to ask questions prior to the endoscopic procedure.

## 2024-06-12 LAB
CYTO UR: NORMAL
LAB AP CASE REPORT: NORMAL
LAB AP CASE REPORT: NORMAL
PATH REPORT.FINAL DX SPEC: NORMAL
PATH REPORT.FINAL DX SPEC: NORMAL
PATH REPORT.GROSS SPEC: NORMAL
PATH REPORT.GROSS SPEC: NORMAL

## 2024-07-14 DIAGNOSIS — K21.9 GASTROESOPHAGEAL REFLUX DISEASE, UNSPECIFIED WHETHER ESOPHAGITIS PRESENT: ICD-10-CM

## 2024-07-15 RX ORDER — OMEPRAZOLE 40 MG/1
40 CAPSULE, DELAYED RELEASE ORAL DAILY
Qty: 90 CAPSULE | Refills: 1 | Status: SHIPPED | OUTPATIENT
Start: 2024-07-15

## 2024-09-16 DIAGNOSIS — I10 BENIGN ESSENTIAL HYPERTENSION: ICD-10-CM

## 2024-09-16 DIAGNOSIS — E11.65 TYPE 2 DIABETES MELLITUS WITH HYPERGLYCEMIA, WITHOUT LONG-TERM CURRENT USE OF INSULIN: Primary | ICD-10-CM

## 2024-09-16 DIAGNOSIS — E78.49 OTHER HYPERLIPIDEMIA: ICD-10-CM

## 2024-09-17 LAB
ALBUMIN SERPL-MCNC: 4.7 G/DL (ref 3.5–5.2)
ALBUMIN/GLOB SERPL: 1.7 G/DL
ALP SERPL-CCNC: 77 U/L (ref 39–117)
ALT SERPL-CCNC: 41 U/L (ref 1–33)
AST SERPL-CCNC: 54 U/L (ref 1–32)
BILIRUB SERPL-MCNC: 0.5 MG/DL (ref 0–1.2)
BUN SERPL-MCNC: 18 MG/DL (ref 8–23)
BUN/CREAT SERPL: 21.2 (ref 7–25)
CALCIUM SERPL-MCNC: 9.9 MG/DL (ref 8.6–10.5)
CHLORIDE SERPL-SCNC: 98 MMOL/L (ref 98–107)
CHOLEST SERPL-MCNC: 135 MG/DL (ref 0–200)
CO2 SERPL-SCNC: 28.5 MMOL/L (ref 22–29)
CREAT SERPL-MCNC: 0.85 MG/DL (ref 0.57–1)
EGFRCR SERPLBLD CKD-EPI 2021: 74.7 ML/MIN/1.73
GLOBULIN SER CALC-MCNC: 2.7 GM/DL
GLUCOSE SERPL-MCNC: 193 MG/DL (ref 65–99)
HBA1C MFR BLD: 8.1 % (ref 4.8–5.6)
HDLC SERPL-MCNC: 36 MG/DL (ref 40–60)
LDLC SERPL CALC-MCNC: 65 MG/DL (ref 0–100)
POTASSIUM SERPL-SCNC: 3.9 MMOL/L (ref 3.5–5.2)
PROT SERPL-MCNC: 7.4 G/DL (ref 6–8.5)
SODIUM SERPL-SCNC: 137 MMOL/L (ref 136–145)
TRIGL SERPL-MCNC: 204 MG/DL (ref 0–150)
VLDLC SERPL CALC-MCNC: 34 MG/DL (ref 5–40)

## 2024-09-23 ENCOUNTER — OFFICE VISIT (OUTPATIENT)
Dept: INTERNAL MEDICINE | Facility: CLINIC | Age: 69
End: 2024-09-23
Payer: MEDICARE

## 2024-09-23 VITALS
SYSTOLIC BLOOD PRESSURE: 138 MMHG | HEART RATE: 78 BPM | BODY MASS INDEX: 42.32 KG/M2 | WEIGHT: 224 LBS | DIASTOLIC BLOOD PRESSURE: 74 MMHG

## 2024-09-23 DIAGNOSIS — R74.01 TRANSAMINITIS: ICD-10-CM

## 2024-09-23 DIAGNOSIS — I10 BENIGN ESSENTIAL HYPERTENSION: Primary | Chronic | ICD-10-CM

## 2024-09-23 DIAGNOSIS — E11.65 TYPE 2 DIABETES MELLITUS WITH HYPERGLYCEMIA, WITHOUT LONG-TERM CURRENT USE OF INSULIN: Chronic | ICD-10-CM

## 2024-09-23 PROCEDURE — 1125F AMNT PAIN NOTED PAIN PRSNT: CPT | Performed by: INTERNAL MEDICINE

## 2024-09-23 PROCEDURE — 3075F SYST BP GE 130 - 139MM HG: CPT | Performed by: INTERNAL MEDICINE

## 2024-09-23 PROCEDURE — 1159F MED LIST DOCD IN RCRD: CPT | Performed by: INTERNAL MEDICINE

## 2024-09-23 PROCEDURE — 3078F DIAST BP <80 MM HG: CPT | Performed by: INTERNAL MEDICINE

## 2024-09-23 PROCEDURE — G2211 COMPLEX E/M VISIT ADD ON: HCPCS | Performed by: INTERNAL MEDICINE

## 2024-09-23 PROCEDURE — 1160F RVW MEDS BY RX/DR IN RCRD: CPT | Performed by: INTERNAL MEDICINE

## 2024-09-23 PROCEDURE — 99214 OFFICE O/P EST MOD 30 MIN: CPT | Performed by: INTERNAL MEDICINE

## 2024-09-23 PROCEDURE — 3052F HG A1C>EQUAL 8.0%<EQUAL 9.0%: CPT | Performed by: INTERNAL MEDICINE

## 2024-09-23 RX ORDER — BLOOD SUGAR DIAGNOSTIC
1 STRIP MISCELLANEOUS
COMMUNITY
Start: 2024-09-08

## 2024-10-03 RX ORDER — PAROXETINE 20 MG/1
20 TABLET, FILM COATED ORAL DAILY
Qty: 90 TABLET | Refills: 1 | Status: SHIPPED | OUTPATIENT
Start: 2024-10-03

## 2024-10-17 DIAGNOSIS — K21.9 GASTROESOPHAGEAL REFLUX DISEASE, UNSPECIFIED WHETHER ESOPHAGITIS PRESENT: ICD-10-CM

## 2024-10-17 RX ORDER — OMEPRAZOLE 40 MG/1
40 CAPSULE, DELAYED RELEASE ORAL DAILY
Qty: 90 CAPSULE | Refills: 1 | Status: SHIPPED | OUTPATIENT
Start: 2024-10-17

## 2025-01-30 ENCOUNTER — OFFICE VISIT (OUTPATIENT)
Dept: INTERNAL MEDICINE | Facility: CLINIC | Age: 70
End: 2025-01-30
Payer: MEDICARE

## 2025-01-30 ENCOUNTER — TELEPHONE (OUTPATIENT)
Dept: INTERNAL MEDICINE | Facility: CLINIC | Age: 70
End: 2025-01-30

## 2025-01-30 VITALS
BODY MASS INDEX: 42.86 KG/M2 | SYSTOLIC BLOOD PRESSURE: 138 MMHG | WEIGHT: 227 LBS | DIASTOLIC BLOOD PRESSURE: 84 MMHG | HEART RATE: 88 BPM | HEIGHT: 61 IN

## 2025-01-30 DIAGNOSIS — Z72.89 OTHER PROBLEMS RELATED TO LIFESTYLE: ICD-10-CM

## 2025-01-30 DIAGNOSIS — R79.9 ABNORMAL FINDING OF BLOOD CHEMISTRY, UNSPECIFIED: ICD-10-CM

## 2025-01-30 DIAGNOSIS — E11.65 TYPE 2 DIABETES MELLITUS WITH HYPERGLYCEMIA, WITHOUT LONG-TERM CURRENT USE OF INSULIN: Chronic | ICD-10-CM

## 2025-01-30 DIAGNOSIS — R74.01 TRANSAMINITIS: ICD-10-CM

## 2025-01-30 DIAGNOSIS — I10 BENIGN ESSENTIAL HYPERTENSION: Primary | Chronic | ICD-10-CM

## 2025-01-30 PROCEDURE — 99214 OFFICE O/P EST MOD 30 MIN: CPT | Performed by: INTERNAL MEDICINE

## 2025-01-30 PROCEDURE — 1160F RVW MEDS BY RX/DR IN RCRD: CPT | Performed by: INTERNAL MEDICINE

## 2025-01-30 PROCEDURE — 3075F SYST BP GE 130 - 139MM HG: CPT | Performed by: INTERNAL MEDICINE

## 2025-01-30 PROCEDURE — 3052F HG A1C>EQUAL 8.0%<EQUAL 9.0%: CPT | Performed by: INTERNAL MEDICINE

## 2025-01-30 PROCEDURE — G2211 COMPLEX E/M VISIT ADD ON: HCPCS | Performed by: INTERNAL MEDICINE

## 2025-01-30 PROCEDURE — 1159F MED LIST DOCD IN RCRD: CPT | Performed by: INTERNAL MEDICINE

## 2025-01-30 PROCEDURE — 3079F DIAST BP 80-89 MM HG: CPT | Performed by: INTERNAL MEDICINE

## 2025-01-30 PROCEDURE — 1125F AMNT PAIN NOTED PAIN PRSNT: CPT | Performed by: INTERNAL MEDICINE

## 2025-01-30 RX ORDER — VALACYCLOVIR HYDROCHLORIDE 500 MG/1
1 TABLET, FILM COATED ORAL EVERY 12 HOURS SCHEDULED
COMMUNITY
Start: 2025-01-24

## 2025-01-30 RX ORDER — METFORMIN HYDROCHLORIDE 500 MG/1
1500 TABLET, EXTENDED RELEASE ORAL
Qty: 270 TABLET | OUTPATIENT
Start: 2025-01-30

## 2025-01-30 RX ORDER — METFORMIN HYDROCHLORIDE 500 MG/1
1500 TABLET, EXTENDED RELEASE ORAL
Qty: 90 TABLET | Refills: 1 | Status: SHIPPED | OUTPATIENT
Start: 2025-01-30

## 2025-01-30 RX ORDER — VALACYCLOVIR HYDROCHLORIDE 1 G/1
1000 TABLET, FILM COATED ORAL 3 TIMES DAILY
Qty: 21 TABLET | Refills: 0 | Status: SHIPPED | OUTPATIENT
Start: 2025-01-30

## 2025-01-30 NOTE — TELEPHONE ENCOUNTER
Patient wants to know if she has to have lab work before next appointment? Please advise [phone]

## 2025-01-30 NOTE — PROGRESS NOTES
"Chief Complaint  Hypertension    Subjective        Ashley Romero presents to [facility] PRIMARY [facility]ARE  History of Present Illness  here to f/u diabetes/blood pressure- she has not been doing well diet/exercise  Started with a burning rash on R side last night-   Has never been on medication for diabetes    Objective   Vital Signs:  /84   Pulse 88   Ht 154.9 cm (61\")   Wt 103 kg (227 lb)   BMI 42.89 kg/m²   Estimated body mass index is 42.89 kg/m² as calculated from the following:    Height as of this encounter: 154.9 cm (61\").    Weight as of this encounter: 103 kg (227 lb).    Class 3 Severe Obesity (BMI >=40). Obesity-related health conditions include the following: obstructive sleep apnea, hypertension, coronary heart disease, diabetes mellitus, and dyslipidemias. Obesity is worsening. BMI is is above average; BMI management plan is completed. We discussed portion control and increasing exercise.      Physical Exam  Constitutional:       Appearance: Normal appearance.   Cardiovascular:      Rate and Rhythm: Normal rate.   Pulmonary:      Effort: Pulmonary effort is normal.   Abdominal:      General: There is no distension.      Tenderness: There is no abdominal tenderness.      Comments: Abdomen protrudes   Skin:     Comments: R lateral axillary line with typical linear vesicular rash        Result Review :  The following data was reviewed by: Andreia Schaefer MD on 01/30/2025:  CMP          5/13/2024    13:05 9/16/2024    11:36 1/2/2025    09:43   CMP   Glucose 153  193  177    BUN 15  18  20    Creatinine 0.76  0.85  0.95    Sodium 142  137  137    Potassium 4.1  3.9  3.6    Chloride 100  98  98    Calcium 9.9  9.9  9.9    Total Protein 7.8  7.4  7.5    Albumin 4.8  4.7  4.6    Globulin 3.0  2.7  2.9    Total Bilirubin 0.6  0.5  0.5    Alkaline Phosphatase 89  77  75    AST (SGOT) 31  54  62    ALT (SGPT) 28  41  36    BUN/Creatinine Ratio 19.7  21.2  21.1      Lipid Panel          " 5/13/2024    13:05 9/16/2024    11:36   Lipid Panel   Total Cholesterol 128  135    Triglycerides 265  204    HDL Cholesterol 37  36    VLDL Cholesterol 41  34    LDL Cholesterol  50  65      A1C Last 3 Results          5/13/2024    13:05 9/16/2024    11:36 1/2/2025    09:43   HGBA1C Last 3 Results   Hemoglobin A1C 8.20  8.10  8.10      Data reviewed : Consultant notes cards           Assessment and Plan   Diagnoses and all orders for this visit:    1. Benign essential hypertension (Primary)  Comments:  borderline- no change in meds now but will check at home- hopefully some improvement if BS down    2. Transaminitis  Comments:  likely due to fatty liver- will check u/s and labs- stressed best tx is wt loss-    3. Type 2 diabetes mellitus with hyperglycemia, without long-term current use of insulin  Comments:  has never been treated- discusse diet and will start metformin- titrate to 1500 mg daily- close f/u, refused GLP-1             Follow Up   No follow-ups on file.  Patient was given instructions and counseling regarding her condition or for health maintenance advice. Please see specific information pulled into the AVS if appropriate.

## 2025-02-17 ENCOUNTER — HOSPITAL ENCOUNTER (OUTPATIENT)
Facility: HOSPITAL | Age: 70
Discharge: HOME | End: 2025-02-17
Admitting: INTERNAL MEDICINE
Payer: MEDICARE

## 2025-02-17 DIAGNOSIS — R74.01 TRANSAMINITIS: ICD-10-CM

## 2025-02-17 DIAGNOSIS — R79.9 ABNORMAL FINDING OF BLOOD CHEMISTRY, UNSPECIFIED: ICD-10-CM

## 2025-02-17 PROCEDURE — 76705 ECHO EXAM OF ABDOMEN: CPT

## 2025-03-25 RX ORDER — PAROXETINE 20 MG/1
20 TABLET, FILM COATED ORAL DAILY
Qty: 90 TABLET | Refills: 1 | Status: SHIPPED | OUTPATIENT
Start: 2025-03-25

## 2025-03-27 ENCOUNTER — TELEPHONE (OUTPATIENT)
Dept: GASTROENTEROLOGY | Facility: CLINIC | Age: 70
End: 2025-03-27
Payer: MEDICARE

## 2025-03-27 NOTE — TELEPHONE ENCOUNTER
"Pt called to schedule her 1 year recall egd for Repeat egd in  1 years--to rebiopsy the stomach for \"atypia\" but no dysplasia with Dr. Garcia. I returned her call and had to leave a voice message to call us back to schedule.   "

## 2025-04-03 DIAGNOSIS — E11.65 TYPE 2 DIABETES MELLITUS WITH HYPERGLYCEMIA, WITHOUT LONG-TERM CURRENT USE OF INSULIN: Chronic | ICD-10-CM

## 2025-04-04 DIAGNOSIS — E11.65 TYPE 2 DIABETES MELLITUS WITH HYPERGLYCEMIA, WITHOUT LONG-TERM CURRENT USE OF INSULIN: Chronic | ICD-10-CM

## 2025-04-04 RX ORDER — METFORMIN HYDROCHLORIDE 500 MG/1
1500 TABLET, EXTENDED RELEASE ORAL
Qty: 90 TABLET | Refills: 1 | Status: SHIPPED | OUTPATIENT
Start: 2025-04-04

## 2025-04-04 RX ORDER — METFORMIN HYDROCHLORIDE 500 MG/1
1500 TABLET, EXTENDED RELEASE ORAL
Qty: 270 TABLET | OUTPATIENT
Start: 2025-04-04

## 2025-04-07 ENCOUNTER — PREP FOR SURGERY (OUTPATIENT)
Dept: SURGERY | Facility: SURGERY CENTER | Age: 70
End: 2025-04-07
Payer: MEDICARE

## 2025-04-07 DIAGNOSIS — K21.00 GASTROESOPHAGEAL REFLUX DISEASE WITH ESOPHAGITIS WITHOUT HEMORRHAGE: ICD-10-CM

## 2025-04-07 DIAGNOSIS — K22.70 BARRETT'S ESOPHAGUS WITHOUT DYSPLASIA: Primary | ICD-10-CM

## 2025-04-08 RX ORDER — SODIUM CHLORIDE, SODIUM LACTATE, POTASSIUM CHLORIDE, CALCIUM CHLORIDE 600; 310; 30; 20 MG/100ML; MG/100ML; MG/100ML; MG/100ML
30 INJECTION, SOLUTION INTRAVENOUS CONTINUOUS PRN
OUTPATIENT
Start: 2025-04-08 | End: 2025-04-08

## 2025-04-08 RX ORDER — SODIUM CHLORIDE 0.9 % (FLUSH) 0.9 %
3 SYRINGE (ML) INJECTION EVERY 12 HOURS SCHEDULED
OUTPATIENT
Start: 2025-04-08

## 2025-04-08 RX ORDER — SODIUM CHLORIDE 0.9 % (FLUSH) 0.9 %
10 SYRINGE (ML) INJECTION AS NEEDED
OUTPATIENT
Start: 2025-04-08

## 2025-04-21 ENCOUNTER — LAB (OUTPATIENT)
Facility: HOSPITAL | Age: 70
End: 2025-04-21
Payer: MEDICARE

## 2025-04-21 PROCEDURE — 82390 ASSAY OF CERULOPLASMIN: CPT | Performed by: INTERNAL MEDICINE

## 2025-04-21 PROCEDURE — 82103 ALPHA-1-ANTITRYPSIN TOTAL: CPT | Performed by: INTERNAL MEDICINE

## 2025-04-21 PROCEDURE — 83036 HEMOGLOBIN GLYCOSYLATED A1C: CPT | Performed by: INTERNAL MEDICINE

## 2025-04-21 PROCEDURE — 87340 HEPATITIS B SURFACE AG IA: CPT | Performed by: INTERNAL MEDICINE

## 2025-04-21 PROCEDURE — 82728 ASSAY OF FERRITIN: CPT | Performed by: INTERNAL MEDICINE

## 2025-04-21 PROCEDURE — 86706 HEP B SURFACE ANTIBODY: CPT | Performed by: INTERNAL MEDICINE

## 2025-04-30 ENCOUNTER — OFFICE VISIT (OUTPATIENT)
Dept: INTERNAL MEDICINE | Facility: CLINIC | Age: 70
End: 2025-04-30
Payer: MEDICARE

## 2025-04-30 VITALS
WEIGHT: 224 LBS | BODY MASS INDEX: 42.29 KG/M2 | SYSTOLIC BLOOD PRESSURE: 130 MMHG | DIASTOLIC BLOOD PRESSURE: 66 MMHG | HEIGHT: 61 IN | HEART RATE: 74 BPM

## 2025-04-30 DIAGNOSIS — Z12.31 VISIT FOR SCREENING MAMMOGRAM: ICD-10-CM

## 2025-04-30 DIAGNOSIS — E11.65 TYPE 2 DIABETES MELLITUS WITH HYPERGLYCEMIA, WITHOUT LONG-TERM CURRENT USE OF INSULIN: Primary | Chronic | ICD-10-CM

## 2025-04-30 DIAGNOSIS — I10 BENIGN ESSENTIAL HYPERTENSION: Chronic | ICD-10-CM

## 2025-04-30 NOTE — PROGRESS NOTES
"Chief Complaint  Diabetes and Hypertension    Subjective        Ashley Romero presents to [facility]  History of Present Illness  Feeling much better with blood sugars coming down- she is more active. Still selling fathers house.  She has been eating better. No complaints today      Objective   Vital Signs:  /66   Pulse 74   Ht 154.9 cm (61\")   Wt 102 kg (224 lb)   BMI 42.32 kg/m²   Estimated body mass index is 42.32 kg/m² as calculated from the following:    Height as of this encounter: 154.9 cm (61\").    Weight as of this encounter: 102 kg (224 lb).            Physical Exam  Constitutional:       Appearance: Normal appearance.   Cardiovascular:      Rate and Rhythm: Normal rate.   Pulmonary:      Effort: Pulmonary effort is normal.   Musculoskeletal:      Right lower leg: No edema.      Left lower leg: No edema.        Result Review :  The following data was reviewed by: Andreia Schaefer MD on 04/30/2025:  Common labs          9/16/2024    11:36 1/2/2025    09:43 4/21/2025    10:32   Common Labs   Glucose 193  177     BUN 18  20     Creatinine 0.85  0.95     Sodium 137  137     Potassium 3.9  3.6     Chloride 98  98     Calcium 9.9  9.9     Albumin 4.7  4.6     Total Bilirubin 0.5  0.5     Alkaline Phosphatase 77  75     AST (SGOT) 54  62     ALT (SGPT) 41  36     WBC  6.43     Hemoglobin  12.4     Hematocrit  40.8     Platelets  311     Total Cholesterol 135      Triglycerides 204      HDL Cholesterol 36      LDL Cholesterol  65      Hemoglobin A1C 8.10  8.10  6.80                Assessment and Plan   Diagnoses and all orders for this visit:    1. Type 2 diabetes mellitus with hyperglycemia, without long-term current use of insulin (Primary)  Comments:  Much improved A1C! She is feeling better- no change in meds  Orders:  -     Comprehensive Metabolic Panel; Future  -     Hemoglobin A1c; Future  -     Lipid Panel With / Chol / HDL Ratio; Future  -     Microalbumin / " Creatinine Urine Ratio - Urine, Clean Catch; Future    2. Visit for screening mammogram  -     Mammo Screening Digital Tomosynthesis Bilateral With CAD; Future    3. Benign essential hypertension  Comments:  controlled, no change.             Follow Up   Return in about 4 months (around 8/30/2025) for Medicare Wellness, Lab Before FUP.  Patient was given instructions and counseling regarding her condition or for health maintenance advice. Please see specific information pulled into the AVS if appropriate.

## 2025-05-06 DIAGNOSIS — E11.65 TYPE 2 DIABETES MELLITUS WITH HYPERGLYCEMIA, WITHOUT LONG-TERM CURRENT USE OF INSULIN: Primary | ICD-10-CM

## 2025-05-06 RX ORDER — LANCETS
EACH MISCELLANEOUS
Qty: 100 EACH | Refills: 12 | Status: SHIPPED | OUTPATIENT
Start: 2025-05-06 | End: 2026-05-06

## 2025-05-16 ENCOUNTER — HOSPITAL ENCOUNTER (OUTPATIENT)
Dept: MAMMOGRAPHY | Facility: HOSPITAL | Age: 70
Discharge: HOME | End: 2025-05-16
Admitting: INTERNAL MEDICINE
Payer: MEDICARE

## 2025-05-16 DIAGNOSIS — Z12.31 VISIT FOR SCREENING MAMMOGRAM: ICD-10-CM

## 2025-05-16 PROCEDURE — 77063 BREAST TOMOSYNTHESIS BI: CPT

## 2025-05-16 PROCEDURE — 77067 SCR MAMMO BI INCL CAD: CPT

## 2025-06-03 DIAGNOSIS — E11.65 TYPE 2 DIABETES MELLITUS WITH HYPERGLYCEMIA, WITHOUT LONG-TERM CURRENT USE OF INSULIN: Chronic | ICD-10-CM

## 2025-06-04 RX ORDER — METFORMIN HYDROCHLORIDE 500 MG/1
1500 TABLET, EXTENDED RELEASE ORAL
Qty: 90 TABLET | Refills: 1 | Status: SHIPPED | OUTPATIENT
Start: 2025-06-04

## 2025-06-10 ENCOUNTER — ANESTHESIA EVENT (OUTPATIENT)
Dept: SURGERY | Facility: SURGERY CENTER | Age: 70
End: 2025-06-10
Payer: MEDICARE

## 2025-06-11 ENCOUNTER — HOSPITAL ENCOUNTER (OUTPATIENT)
Facility: SURGERY CENTER | Age: 70
Setting detail: HOSPITAL OUTPATIENT SURGERY
Discharge: HOME | End: 2025-06-11
Attending: INTERNAL MEDICINE | Admitting: INTERNAL MEDICINE
Payer: MEDICARE

## 2025-06-11 ENCOUNTER — ANESTHESIA (OUTPATIENT)
Dept: SURGERY | Facility: SURGERY CENTER | Age: 70
End: 2025-06-11
Payer: MEDICARE

## 2025-06-11 VITALS
HEART RATE: 58 BPM | BODY MASS INDEX: 40.99 KG/M2 | OXYGEN SATURATION: 95 % | WEIGHT: 217.12 LBS | HEIGHT: 61 IN | SYSTOLIC BLOOD PRESSURE: 126 MMHG | TEMPERATURE: 97.5 F | RESPIRATION RATE: 16 BRPM | DIASTOLIC BLOOD PRESSURE: 72 MMHG

## 2025-06-11 DIAGNOSIS — K22.70 BARRETT'S ESOPHAGUS WITHOUT DYSPLASIA: ICD-10-CM

## 2025-06-11 DIAGNOSIS — K21.00 GASTROESOPHAGEAL REFLUX DISEASE WITH ESOPHAGITIS WITHOUT HEMORRHAGE: ICD-10-CM

## 2025-06-11 LAB — GLUCOSE BLDC GLUCOMTR-MCNC: 118 MG/DL (ref 70–130)

## 2025-06-11 PROCEDURE — 25810000003 LACTATED RINGERS PER 1000 ML: Performed by: INTERNAL MEDICINE

## 2025-06-11 PROCEDURE — 25010000002 GLYCOPYRROLATE PF 0.2 MG/ML SOLUTION: Performed by: NURSE ANESTHETIST, CERTIFIED REGISTERED

## 2025-06-11 PROCEDURE — 25010000002 LIDOCAINE 2% SOLUTION: Performed by: NURSE ANESTHETIST, CERTIFIED REGISTERED

## 2025-06-11 PROCEDURE — 25010000002 PROPOFOL 1000 MG/100ML EMULSION: Performed by: NURSE ANESTHETIST, CERTIFIED REGISTERED

## 2025-06-11 PROCEDURE — 43239 EGD BIOPSY SINGLE/MULTIPLE: CPT | Performed by: INTERNAL MEDICINE

## 2025-06-11 PROCEDURE — 88305 TISSUE EXAM BY PATHOLOGIST: CPT | Performed by: INTERNAL MEDICINE

## 2025-06-11 PROCEDURE — 25010000002 PROPOFOL 10 MG/ML EMULSION: Performed by: NURSE ANESTHETIST, CERTIFIED REGISTERED

## 2025-06-11 RX ORDER — SODIUM CHLORIDE, SODIUM LACTATE, POTASSIUM CHLORIDE, CALCIUM CHLORIDE 600; 310; 30; 20 MG/100ML; MG/100ML; MG/100ML; MG/100ML
30 INJECTION, SOLUTION INTRAVENOUS CONTINUOUS PRN
Status: DISCONTINUED | OUTPATIENT
Start: 2025-06-11 | End: 2025-06-11 | Stop reason: HOSPADM

## 2025-06-11 RX ORDER — LIDOCAINE HYDROCHLORIDE 20 MG/ML
INJECTION, SOLUTION INFILTRATION; PERINEURAL AS NEEDED
Status: DISCONTINUED | OUTPATIENT
Start: 2025-06-11 | End: 2025-06-11 | Stop reason: SURG

## 2025-06-11 RX ORDER — PROPOFOL 10 MG/ML
INJECTION, EMULSION INTRAVENOUS AS NEEDED
Status: DISCONTINUED | OUTPATIENT
Start: 2025-06-11 | End: 2025-06-11 | Stop reason: SURG

## 2025-06-11 RX ORDER — SODIUM CHLORIDE 0.9 % (FLUSH) 0.9 %
10 SYRINGE (ML) INJECTION AS NEEDED
Status: DISCONTINUED | OUTPATIENT
Start: 2025-06-11 | End: 2025-06-11 | Stop reason: HOSPADM

## 2025-06-11 RX ORDER — SODIUM CHLORIDE 0.9 % (FLUSH) 0.9 %
3 SYRINGE (ML) INJECTION EVERY 12 HOURS SCHEDULED
Status: DISCONTINUED | OUTPATIENT
Start: 2025-06-11 | End: 2025-06-11 | Stop reason: HOSPADM

## 2025-06-11 RX ADMIN — SODIUM CHLORIDE, POTASSIUM CHLORIDE, SODIUM LACTATE AND CALCIUM CHLORIDE 30 ML/HR: 600; 310; 30; 20 INJECTION, SOLUTION INTRAVENOUS at 07:42

## 2025-06-11 RX ADMIN — PROPOFOL 200 MCG/KG/MIN: 10 INJECTION, EMULSION INTRAVENOUS at 08:23

## 2025-06-11 RX ADMIN — GLYCOPYRROLATE 0.2 MG: 0.2 INJECTION, SOLUTION INTRAMUSCULAR; INTRAVITREAL at 08:23

## 2025-06-11 RX ADMIN — LIDOCAINE HYDROCHLORIDE 100 MG: 20 INJECTION, SOLUTION INFILTRATION; PERINEURAL at 08:23

## 2025-06-11 RX ADMIN — PROPOFOL 150 MG: 10 INJECTION, EMULSION INTRAVENOUS at 08:23

## 2025-06-11 NOTE — H&P
No chief complaint on file.      HPI  GERD  barretts         Problem List:    Patient Active Problem List   Diagnosis    OA (osteoarthritis) of knee    Ascending aorta dilatation    Benign essential hypertension    Hyperlipidemia    Vasovagal syncope    Obstructive sleep apnea syndrome    Owens's esophagus without dysplasia    Gastroesophageal reflux disease    Type 2 diabetes mellitus with hyperglycemia, without long-term current use of insulin    Gastroparesis    Encounter for screening for malignant neoplasm of colon    CAD in native artery       Medical History:    Past Medical History:   Diagnosis Date    Arthritis     Owens esophagus     Benign essential hypertension     Depression     Family history of colonic polyps     GERD (gastroesophageal reflux disease)     History of ulcer disease     Hyperglycemia     Hyperlipidemia     Nonalcoholic fatty liver disease     Sleep apnea     CPAP    Viral illness     Yeast infection         Social History:    Social History     Socioeconomic History    Marital status:    Tobacco Use    Smoking status: Never    Smokeless tobacco: Never   Vaping Use    Vaping status: Never Used   Substance and Sexual Activity    Alcohol use: Yes     Comment: OCCASIONALLY    Drug use: No    Sexual activity: Defer       Family History:   Family History   Problem Relation Age of Onset    Diabetes Mother     Graves' disease Mother     Heart disease Father     Colonic polyp Maternal Grandfather     Colon polyps Maternal Grandfather     Breast cancer Maternal Aunt     Malig Hyperthermia Neg Hx     Crohn's disease Neg Hx     Colon cancer Neg Hx     Irritable bowel syndrome Neg Hx     Ulcerative colitis Neg Hx        Surgical History:   Past Surgical History:   Procedure Laterality Date    COLONOSCOPY  2005    Dr Garcia    COLONOSCOPY N/A 8/10/2022    Procedure: COLONOSCOPY;  Surgeon: Anderson Garcia MD;  [place] [facility] [place];  Service: Gastroenterology;  Laterality: N/A;   diverticulosis hemorrhoids    ENDOSCOPY      ENDOSCOPY Left 6/3/2021    Procedure: ESOPHAGOGASTRODUODENOSCOPY;  Surgeon: Anderson Garcia MD;  [place] [facility] [place];  Service: Gastroenterology;  Laterality: Left;    ENDOSCOPY N/A 6/11/2024    Procedure: ESOPHAGOGASTRODUODENOSCOPY;  Surgeon: Anderson Garcia MD;  [place] [facility] [place];  Service: Gastroenterology;  Laterality: N/A;  Possible Candida, Barretts Esophagus, Gastritis    TONSILLECTOMY      TOTAL KNEE ARTHROPLASTY Right 3/14/2019    Procedure: RIGHT TOTAL KNEE ARTHROPLASTY;  Surgeon: Estevan Esquivel MD;  Location: [facility] OR;  Service: Orthopedics    TOTAL KNEE ARTHROPLASTY Right 03/2019    TOTAL KNEE ARTHROPLASTY Left 8/15/2019    Procedure: TOTAL KNEE ARTHROPLASTY;  Surgeon: Estevan Esquivel MD;  Location: [facility] OR;  Service: Orthopedics    TUBAL ABDOMINAL LIGATION      UPPER GASTROINTESTINAL ENDOSCOPY         No current facility-administered medications for this encounter.    Allergies:   Allergies   Allergen Reactions    Sulfa Antibiotics Other (See Comments)    Codeine Nausea Only        The following portions of the patient's history were reviewed by me and updated as appropriate: review of systems, allergies, current medications, past family history, past medical history, past social history, past surgical history and problem list.    There were no vitals filed for this visit.    PHYSICAL EXAM:    CONSTITUTIONAL:  today's vital signs reviewed by me  GASTROINTESTINAL: abdomen is soft nontender nondistended with normal active bowel sounds, no masses are appreciated    Assessment/ Plan  GERD  Barretts    egd    Risks and benefits as well as alternatives to endoscopic evaluation were explained to the patient and they voiced understanding and wish to proceed.  These risks include but are not limited to the risk of bleeding, perforation, adverse reaction to sedation, and missed lesions.  The patient was given the opportunity to ask  questions prior to the endoscopic procedure.

## 2025-06-11 NOTE — ANESTHESIA PREPROCEDURE EVALUATION
Anesthesia Evaluation                  Airway   Mallampati: II  Dental      Pulmonary    (+) ,sleep apnea  (-) not a smoker  Cardiovascular     (+) hypertension, hyperlipidemia      Neuro/Psych  GI/Hepatic/Renal/Endo    (+) morbid obesity, GERD, diabetes mellitus type 2    Musculoskeletal     Abdominal    Substance History      OB/GYN          Other                    Anesthesia Plan    ASA 3     MAC       Anesthetic plan, risks, benefits, and alternatives have been provided, discussed and informed consent has been obtained with: patient.    CODE STATUS:

## 2025-06-12 LAB
CYTO UR: NORMAL
LAB AP CASE REPORT: NORMAL
PATH REPORT.FINAL DX SPEC: NORMAL
PATH REPORT.GROSS SPEC: NORMAL

## 2025-07-24 NOTE — ANESTHESIA POSTPROCEDURE EVALUATION
"Patient: Ashley Romero    Procedure Summary       Date: 06/11/25 Room / Location: [facility]    Anesthesia Start: 0816 Anesthesia Stop: 0836    Procedure: ESOPHAGOGASTRODUODENOSCOPY Diagnosis:       Owens's esophagus without dysplasia      Gastroesophageal reflux disease with esophagitis without hemorrhage      (Owens's esophagus without dysplasia [K22.70])      (Gastroesophageal reflux disease with esophagitis without hemorrhage [K21.00])    Surgeons: Anderson Garcia MD Provider: Roby Heart MD    Anesthesia Type: MAC ASA Status: 3            Anesthesia Type: MAC    Vitals  Vitals Value Taken Time   /78 06/11/25 08:55   Temp 36.4 °C (97.5 °F) 06/11/25 08:34   Pulse 59 06/11/25 08:53   Resp 16 06/11/25 08:34   SpO2 96 % 06/11/25 08:53   Vitals shown include unfiled device data.        Post Anesthesia Care and Evaluation    Pain management: adequate    Airway patency: patent  Anesthetic complications: No anesthetic complications    Cardiovascular status: acceptable  Respiratory status: acceptable  Hydration status: acceptable    Comments: /78   Pulse 58   Temp 36.4 °C (97.5 °F) (Temporal)   Resp 16   Ht 154.9 cm (61\")   Wt 98.5 kg (217 lb 1.9 oz)   SpO2 95%   BMI 41.02 kg/m²       " No

## 2025-07-31 DIAGNOSIS — E11.65 TYPE 2 DIABETES MELLITUS WITH HYPERGLYCEMIA, WITHOUT LONG-TERM CURRENT USE OF INSULIN: Chronic | ICD-10-CM

## 2025-08-01 RX ORDER — METFORMIN HYDROCHLORIDE 500 MG/1
1500 TABLET, EXTENDED RELEASE ORAL
Qty: 90 TABLET | Refills: 1 | Status: SHIPPED | OUTPATIENT
Start: 2025-08-01

## 2025-08-20 ENCOUNTER — LAB (OUTPATIENT)
Facility: HOSPITAL | Age: 70
End: 2025-08-20
Payer: MEDICARE

## 2025-08-20 PROCEDURE — 80053 COMPREHEN METABOLIC PANEL: CPT | Performed by: INTERNAL MEDICINE

## 2025-08-20 PROCEDURE — 82570 ASSAY OF URINE CREATININE: CPT | Performed by: INTERNAL MEDICINE

## 2025-08-20 PROCEDURE — 80061 LIPID PANEL: CPT | Performed by: INTERNAL MEDICINE

## 2025-08-20 PROCEDURE — 83036 HEMOGLOBIN GLYCOSYLATED A1C: CPT | Performed by: INTERNAL MEDICINE

## 2025-08-20 PROCEDURE — 82043 UR ALBUMIN QUANTITATIVE: CPT | Performed by: INTERNAL MEDICINE

## (undated) DEVICE — OCCLUSIVE GAUZE STRIP,3% BISMUTH TRIBROMOPHENATE IN PETROLATUM BLEND: Brand: XEROFORM

## (undated) DEVICE — VIAL FORMLN CAP 10PCT 20ML

## (undated) DEVICE — DUAL CUT SAGITTAL BLADE

## (undated) DEVICE — APPL CHLORAPREP W/TINT 26ML ORNG

## (undated) DEVICE — Device

## (undated) DEVICE — PAD,ABDOMINAL,8"X10",ST,LF: Brand: MEDLINE

## (undated) DEVICE — DRAPE,REIN 53X77,STERILE: Brand: MEDLINE

## (undated) DEVICE — BITEBLOCK OMNI BLOC

## (undated) DEVICE — NDL SPINE 20G 3 1/2 YEL STRL 1P/U

## (undated) DEVICE — DRSNG GZ PETROLTM XEROFORM CURAD 1X8IN STRL

## (undated) DEVICE — SINGLE-USE BIOPSY FORCEPS: Brand: RADIAL JAW 4

## (undated) DEVICE — MSK ENDO PORT O2 POM ELITE CURAPLEX A/

## (undated) DEVICE — ANTIBACTERIAL UNDYED BRAIDED (POLYGLACTIN 910), SYNTHETIC ABSORBABLE SUTURE: Brand: COATED VICRYL

## (undated) DEVICE — SPNG GZ WOVN 4X4IN 12PLY 10/BX STRL

## (undated) DEVICE — DECANT BG O JET

## (undated) DEVICE — ENCORE® LATEX ORTHO SIZE 7.5, STERILE LATEX POWDER-FREE SURGICAL GLOVE: Brand: ENCORE

## (undated) DEVICE — GOWN PROC ENDOARMOR GI LVL3 HY/SHLD UNIV

## (undated) DEVICE — PK KN TOTL 40

## (undated) DEVICE — FRCP BIOP RADLJAW4 HOT 2.2X240 BX40

## (undated) DEVICE — GLV SURG BIOGEL LTX PF 7 1/2

## (undated) DEVICE — BNDG ELAS ELITE V/CLOSE 4IN 5YD LF STRL

## (undated) DEVICE — BLCK/BITE BLOX W/DENTL/RIM W/STRAP 54F

## (undated) DEVICE — FLEX ADVANTAGE 1500CC: Brand: FLEX ADVANTAGE

## (undated) DEVICE — KT ORCA ORCAPOD DISP STRL

## (undated) DEVICE — BNDG ELAS ELITE V/CLOSE 6IN 5YD LF STRL

## (undated) DEVICE — DRSNG PAD ABD 8X10IN STRL

## (undated) DEVICE — KT DRN EVAC WND PVC PCH WTROC RND 10F400

## (undated) DEVICE — STPLR SKIN VISISTAT WD 35CT

## (undated) DEVICE — CANN NASL CO2 TRULINK W/O2 A/

## (undated) DEVICE — PIN TROC SIGNATURE PK/2

## (undated) DEVICE — ADAPT CLN SCPE ENDO PORPOISE BX/50 DISP

## (undated) DEVICE — GLV SURG PREMIERPRO ORTHO LTX PF SZ7.5 BRN

## (undated) DEVICE — UNDERCAST PADDING: Brand: DEROYAL

## (undated) DEVICE — MAT FLR ABSORBENT LG 4FT 10 2.5FT